# Patient Record
Sex: FEMALE | Race: WHITE | NOT HISPANIC OR LATINO | Employment: OTHER | URBAN - METROPOLITAN AREA
[De-identification: names, ages, dates, MRNs, and addresses within clinical notes are randomized per-mention and may not be internally consistent; named-entity substitution may affect disease eponyms.]

---

## 2017-01-24 ENCOUNTER — OFFICE VISIT (OUTPATIENT)
Dept: FAMILY MEDICINE | Facility: CLINIC | Age: 67
End: 2017-01-24
Payer: COMMERCIAL

## 2017-01-24 VITALS — HEART RATE: 72 BPM | SYSTOLIC BLOOD PRESSURE: 124 MMHG | TEMPERATURE: 99.4 F | DIASTOLIC BLOOD PRESSURE: 76 MMHG

## 2017-01-24 DIAGNOSIS — H26.9 CATARACT: ICD-10-CM

## 2017-01-24 DIAGNOSIS — Z01.818 PREOP GENERAL PHYSICAL EXAM: Primary | ICD-10-CM

## 2017-01-24 DIAGNOSIS — I10 HYPERTENSION GOAL BP (BLOOD PRESSURE) < 140/80: ICD-10-CM

## 2017-01-24 PROCEDURE — 99214 OFFICE O/P EST MOD 30 MIN: CPT | Performed by: FAMILY MEDICINE

## 2017-01-24 NOTE — PROGRESS NOTES
[facility] [place]  [address] [place] [id number]  [phone]  Dept: [phone]    PRE-OP EVALUATION:  Today's date: 2017    Melissa Jean Penrose (: 1950) presents for pre-operative evaluation assessment as requested by Dr. Christy.  She requires evaluation and anesthesia risk assessment prior to undergoing surgery/procedure for treatment of cateract .  Proposed procedure: right cataract     Date of Surgery/ Procedure: 2017  Time of Surgery/ Procedure: [facility]/Surgical Facility: [facility]    Primary Physician: Qamar Zavala  Type of Anesthesia Anticipated: to be determined    Patient has a Health Care Directive or Living Will:  YES     1. NO - Do you have a history of heart attack, stroke, stent, bypass or surgery on an artery in the head, neck, heart or legs?  2. NO - Do you ever have any pain or discomfort in your chest?  3. NO - Do you have a history of  Heart Failure?  4. NO - Are you troubled by shortness of breath when: walking on the level, up a slight hill or at night?  5. NO - Do you currently have a cold, bronchitis or other respiratory infection?  6. NO - Do you have a cough, shortness of breath or wheezing?  7. NO - Do you sometimes get pains in the calves of your legs when you walk?  8. NO - Do you or anyone in your family have previous history of blood clots?  9. NO - Do you or does anyone in your family have a serious bleeding problem such as prolonged bleeding following surgeries or cuts?  10. NO - Have you ever had problems with anemia or been told to take iron pills?  11. NO - Have you had any abnormal blood loss such as black, tarry or bloody stools, or abnormal vaginal bleeding?  12. YES - Have you ever had a blood transfusion?  13. NO - Have you or any of your relatives ever had problems with anesthesia?  14. NO - Do you have sleep apnea, excessive snoring or daytime drowsiness?  15. NO - Do you have any prosthetic heart  valves?  16. NO - Do you have prosthetic joints?  17. NO - Is there any chance that you may be pregnant?      HPI:                                                      Brief HPI related to upcoming procedure: issues with alcohol, she is abstaining it and feeling good. BP medication on metoprolol and losartn, which is stable, she does take few mediation for headache and migraine , and planing to follow up with [place] [facility].  Had frequent falls recently, some related to alcohol intake. Denies any current headache.          MEDICAL HISTORY:                                                      Patient Active Problem List    Diagnosis Date Noted     Lactic acidosis 09/20/2016     Priority: Medium     ELIZABETH (acute kidney injury) (H) 07/13/2016     Priority: Medium     ACP (advance care planning) 06/20/2016     Priority: Medium     Advance Care Planning 6/20/2016: Receipt of ACP document:  Received: Health Care Directive which was witnessed or notarized on 11-10-15.  Document not previously scanned.  Validation form completed and sent with document to be scanned.  Code Status reflects choices in most recent ACP document.  Confirmed/documented designated decision maker(s).  Added by Ronda Mullen RN Advance Care Planning Liaison with [employer]             Migraine without aura and without status migrainosus, not intractable 06/07/2016     Priority: Medium     Short-term memory loss 06/07/2016     Priority: Medium     Noticed after the concussion.       Traumatic brain injury, with loss of consciousness of unspecified duration, sequela (H) 06/07/2016     Priority: Medium     Follow up in [facility], also saw [facility].       Hypertension goal BP (blood pressure) < 140/80 06/07/2016     Priority: Medium     Alcohol abuse 06/07/2016     Priority: Medium     Frequent falls 06/07/2016     Priority: Medium     Elevated liver function tests 06/07/2016     Priority: Medium     Pulmonary  nodules 06/07/2016     Priority: Medium     CT June-2016 follow up in 1 year.        Past Medical History   Diagnosis Date     Hypertension      TBI (traumatic brain injury) (H)      Substance abuse      ETOH     Dementia      Allergic state      Past Surgical History   Procedure Laterality Date     Orthopedic surgery       Current Outpatient Prescriptions   Medication Sig Dispense Refill     acetaminophen (TYLENOL) 325 MG tablet Take 2 tablets (650 mg) by mouth every 6 hours as needed for mild pain 100 tablet 0     traMADol (ULTRAM) 50 MG tablet Take 0.5 tablets (25 mg) by mouth every 6 hours as needed for moderate pain 20 tablet 0     MELOXICAM PO Take 15 mg by mouth daily as needed       METOPROLOL TARTRATE PO Take 25 mg by mouth 2 times daily       NORTRIPTYLINE HCL PO Take 25 mg by mouth 2 times daily as needed       zolpidem (AMBIEN CR) 12.5 MG CR tablet Take 12.5 mg by mouth nightly as needed for sleep       MELATONIN PO Take 3 mg by mouth nightly as needed       losartan (COZAAR) 100 MG tablet Take 1 tablet by mouth daily        SUMAtriptan (IMITREX) 100 MG tablet Take 1 tablet by mouth at onset of headache (Take every 2 hours as needed. Max 200mg in 24 hours.)   3     Ascorbic Acid (VITAMIN C PO) Take 500 mg by mouth daily        multivitamin, therapeutic (THERA-VIT) TABS Take 1 tablet by mouth daily        azelastine (ASTELIN) 0.1 % nasal spray Spray 2 sprays into both nostrils every morning        mometasone (NASONEX) 50 MCG/ACT nasal spray Spray 2 sprays into both nostrils At Bedtime        Gabapentin (NEURONTIN PO) Take 300 mg by mouth daily as needed        Topiramate (TOPAMAX PO) Take 25 mg by mouth 2 times daily        OTC products: None, except as noted above    Allergies   Allergen Reactions     Nuts [Peanut-Derived] Nausea and Vomiting     Amoxicillin      Morphine       Latex Allergy: NO    Social History   Substance Use Topics     Smoking status: Former Smoker     Quit date: 10/23/1985      Smokeless tobacco: Never Used     Alcohol Use: 0.5 oz/week     1 Shots of liquor per week      Comment: daily 3-4 oz     History   Drug Use No       REVIEW OF SYSTEMS:                                                    C: NEGATIVE for fever, chills, change in weight  E/M: NEGATIVE for ear, mouth and throat problems  R: NEGATIVE for significant cough or SOB  CV: NEGATIVE for chest pain, palpitations or peripheral edema    EXAM:                                                    /76 mmHg  Pulse 72  Temp(Src) 99.4  F (37.4  C) (Tympanic)  Wt   GENERAL APPEARANCE: healthy, alert and no distress  HENT: ear canals and TM's normal and nose and mouth without ulcers or lesions  RESP: lungs clear to auscultation - no rales, rhonchi or wheezes  CV: regular rate and rhythm, normal S1 S2, no S3 or S4 and no murmur, click or rub   ABDOMEN: soft, nontender, no HSM or masses and bowel sounds normal  NEURO: Normal strength and tone, sensory exam grossly normal, mentation intact and speech normal    DIAGNOSTICS:                                                    EKG not indicated    Recent Labs   Lab Test  12/29/16   0125  09/21/16   0850  09/21/16   0559   10/23/14   1035   HGB  13.0  12.1   --    < >  12.1   PLT  211  113*   --    < >  267   INR   --    --    --    --   0.94   NA  136   --   138   < >  137   POTASSIUM  3.5   --   3.4   < >  4.0   CR  0.76   --   0.64   < >  1.37*    < > = values in this interval not displayed.        IMPRESSION:                                                    Reason for surgery/procedure:   Diagnosis/reason for consult:   1. Preop general physical exam    2. Cataract    3. Hypertension goal BP (blood pressure) < 140/80          The proposed surgical procedure is considered LOW risk.    REVISED CARDIAC RISK INDEX  The patient has the following serious cardiovascular risks for perioperative complications such as (MI, PE, VFib and 3  AV Block):  No serious cardiac risks      The patient  has the following additional risks for perioperative complications:  No identified additional risks      ICD-10-CM    1. Preop general physical exam Z01.818        RECOMMENDATIONS:                                                          --Patient is to take all scheduled medications on the day of surgery EXCEPT for modifications listed below.    APPROVAL GIVEN to proceed with proposed procedure, without further diagnostic evaluation       Signed Electronically by: Qamar Zavala MD    Copy of this evaluation report is provided to requesting physician.    [place] Preop Guidelines

## 2017-01-24 NOTE — MR AVS SNAPSHOT
After Visit Summary   1/24/2017    Melissa Jean Penrose    MRN: [id number]           Patient Information     Date Of Birth          1950        Visit Information        Provider Department      1/24/2017 3:00 PM Qamar Zavala MD [facility] [place]        Today's Diagnoses     Preop general physical exam    -  1     Cataract         Hypertension goal BP (blood pressure) < 140/80           Care Instructions      Before Your Surgery      Call your surgeon if there is any change in your health. This includes signs of a cold or flu (such as a sore throat, runny nose, cough, rash or fever).    Do not smoke, drink alcohol or take over the counter medicine (unless your surgeon or primary care doctor tells you to) for the 24 hours before and after surgery.    If you take prescribed drugs: Follow your doctor s orders about which medicines to take and which to stop until after surgery.    Eating and drinking prior to surgery: follow the instructions from your surgeon    Take a shower or bath the night before surgery. Use the soap your surgeon gave you to gently clean your skin. If you do not have soap from your surgeon, use your regular soap. Do not shave or scrub the surgery site.  Wear clean pajamas and have clean sheets on your bed.         Follow-ups after your visit        Your next 10 appointments already scheduled     Feb 07, 2017   Procedure with Brianna Christy MD   [facility] (--)    [address]  [facility] [id number] [phone]              Who to contact     If you have questions or need follow up information about today's clinic visit or your schedule please contact [facility] [place] directly at [phone].  Normal or non-critical lab and imaging results will be communicated to you by MyChart, letter or phone within 4 business days after the clinic has received the results. If you do not hear from us within 7 days, please  contact the clinic through [employer] or phone. If you have a critical or abnormal lab result, we will notify you by phone as soon as possible.  Submit refill requests through [employer] or call your pharmacy and they will forward the refill request to us. Please allow 3 business days for your refill to be completed.          Additional Information About Your Visit        [employer] Information     [employer] gives you secure access to your electronic health record. If you see a primary care provider, you can also send messages to your care team and make appointments. If you have questions, please call your primary care clinic.  If you do not have a primary care provider, please call [phone] and they will assist you.        Care EveryWhere ID     This is your Care EveryWhere ID. This could be used by other organizations to access your [facility] medical records  [id number]        Your Vitals Were     Pulse Temperature                72 99.4  F (37.4  C) (Tympanic)           Blood Pressure from Last 3 Encounters:   01/24/17 124/76   12/29/16 132/94   09/21/16 148/87    Weight from Last 3 Encounters:   09/20/16 216 lb 14.4 oz (98.385 kg)   09/19/16 231 lb (104.781 kg)   09/09/16 231 lb (104.781 kg)              Today, you had the following     No orders found for display         Today's Medication Changes          These changes are accurate as of: 1/24/17  3:26 PM.  If you have any questions, ask your nurse or doctor.               Stop taking these medicines if you haven't already. Please contact your care team if you have questions.     traMADol 50 MG tablet   Commonly known as:  ULTRAM   Stopped by:  Qamar Zavala MD                    Primary Care Provider Office Phone # Fax #    Qamar Zavala [phone]       [facility] [place] [address] [place] [address] [address]  [place] [address]        Thank you!     Thank you for choosing [facility] [place]  for your care. Our goal is always to provide you  with excellent care. Hearing back from our patients is one way we can continue to improve our services. Please take a few minutes to complete the written survey that you may receive in the mail after your visit with us. Thank you!             Your Updated Medication List - Protect others around you: Learn how to safely use, store and throw away your medicines at www.disposemymeds.org.          This list is accurate as of: 1/24/17  3:26 PM.  Always use your most recent med list.                   Brand Name Dispense Instructions for use    acetaminophen 325 MG tablet    TYLENOL    100 tablet    Take 2 tablets (650 mg) by mouth every 6 hours as needed for mild pain       azelastine 0.1 % spray    ASTELIN     Spray 2 sprays into both nostrils every morning       losartan 100 MG tablet    COZAAR     Take 1 tablet by mouth daily       MELATONIN PO      Take 3 mg by mouth nightly as needed       MELOXICAM PO      Take 15 mg by mouth daily as needed       METOPROLOL TARTRATE PO      Take 25 mg by mouth 2 times daily       mometasone 50 MCG/ACT spray    NASONEX     Spray 2 sprays into both nostrils At Bedtime       multivitamin, therapeutic Tabs tablet      Take 1 tablet by mouth daily       NEURONTIN PO      Take 300 mg by mouth daily as needed       NORTRIPTYLINE HCL PO      Take 25 mg by mouth 2 times daily as needed       SUMAtriptan 100 MG tablet    IMITREX     Take 1 tablet by mouth at onset of headache (Take every 2 hours as needed. Max 200mg in 24 hours.)       TOPAMAX PO      Take 25 mg by mouth 2 times daily       VITAMIN C PO      Take 500 mg by mouth daily       zolpidem 12.5 MG CR tablet    AMBIEN CR     Take 12.5 mg by mouth nightly as needed for sleep

## 2017-02-07 ENCOUNTER — ANESTHESIA EVENT (OUTPATIENT)
Dept: SURGERY | Facility: CLINIC | Age: 67
End: 2017-02-07
Payer: MEDICARE

## 2017-02-07 ENCOUNTER — ANESTHESIA (OUTPATIENT)
Dept: SURGERY | Facility: CLINIC | Age: 67
End: 2017-02-07
Payer: MEDICARE

## 2017-02-07 PROCEDURE — 25800025 ZZH RX 258: Performed by: NURSE ANESTHETIST, CERTIFIED REGISTERED

## 2017-02-07 PROCEDURE — 25000125 ZZHC RX 250: Performed by: NURSE ANESTHETIST, CERTIFIED REGISTERED

## 2017-02-07 PROCEDURE — 25000128 H RX IP 250 OP 636: Performed by: NURSE ANESTHETIST, CERTIFIED REGISTERED

## 2017-02-07 RX ORDER — SODIUM CHLORIDE, SODIUM LACTATE, POTASSIUM CHLORIDE, CALCIUM CHLORIDE 600; 310; 30; 20 MG/100ML; MG/100ML; MG/100ML; MG/100ML
INJECTION, SOLUTION INTRAVENOUS CONTINUOUS PRN
Status: DISCONTINUED | OUTPATIENT
Start: 2017-02-07 | End: 2017-02-07

## 2017-02-07 RX ORDER — ONDANSETRON 2 MG/ML
INJECTION INTRAMUSCULAR; INTRAVENOUS PRN
Status: DISCONTINUED | OUTPATIENT
Start: 2017-02-07 | End: 2017-02-07

## 2017-02-07 RX ORDER — FENTANYL CITRATE 50 UG/ML
INJECTION, SOLUTION INTRAMUSCULAR; INTRAVENOUS PRN
Status: DISCONTINUED | OUTPATIENT
Start: 2017-02-07 | End: 2017-02-07

## 2017-02-07 RX ADMIN — DEXMEDETOMIDINE 8 MCG: 100 INJECTION, SOLUTION, CONCENTRATE INTRAVENOUS at 08:32

## 2017-02-07 RX ADMIN — MIDAZOLAM HYDROCHLORIDE 1 MG: 1 INJECTION, SOLUTION INTRAMUSCULAR; INTRAVENOUS at 08:29

## 2017-02-07 RX ADMIN — DEXMEDETOMIDINE 8 MCG: 100 INJECTION, SOLUTION, CONCENTRATE INTRAVENOUS at 08:25

## 2017-02-07 RX ADMIN — SODIUM CHLORIDE, POTASSIUM CHLORIDE, SODIUM LACTATE AND CALCIUM CHLORIDE: 600; 310; 30; 20 INJECTION, SOLUTION INTRAVENOUS at 08:00

## 2017-02-07 RX ADMIN — FENTANYL CITRATE 25 MCG: 50 INJECTION, SOLUTION INTRAMUSCULAR; INTRAVENOUS at 08:27

## 2017-02-07 RX ADMIN — ONDANSETRON 4 MG: 2 INJECTION INTRAMUSCULAR; INTRAVENOUS at 08:29

## 2017-02-07 RX ADMIN — MIDAZOLAM HYDROCHLORIDE 1 MG: 1 INJECTION, SOLUTION INTRAMUSCULAR; INTRAVENOUS at 08:23

## 2017-02-07 RX ADMIN — MIDAZOLAM HYDROCHLORIDE 1 MG: 1 INJECTION, SOLUTION INTRAMUSCULAR; INTRAVENOUS at 08:35

## 2017-02-07 RX ADMIN — FENTANYL CITRATE 25 MCG: 50 INJECTION, SOLUTION INTRAMUSCULAR; INTRAVENOUS at 08:32

## 2017-02-07 RX ADMIN — DEXMEDETOMIDINE 4 MCG: 100 INJECTION, SOLUTION, CONCENTRATE INTRAVENOUS at 08:29

## 2017-02-07 NOTE — ANESTHESIA POSTPROCEDURE EVALUATION
Patient: Melissa Jean Penrose    Procedure(s):  RIGHT PHACOEMULSIFICATION CLEAR CORNEA WITH STANDARD INTRAOCULAR LENS IMPLANT  - Wound Class: I-Clean    Diagnosis:RIGHT EYE CATARACT   Diagnosis Additional Information: No value filed.    Anesthesia Type:  MAC    Note:  Anesthesia Post Evaluation    Patient location during evaluation: PACU  Patient participation: Able to fully participate in evaluation  Level of consciousness: awake  Pain management: adequate  Airway patency: patent  Cardiovascular status: acceptable  Respiratory status: acceptable  Hydration status: acceptable  PONV: none     Anesthetic complications: None          Last vitals:  Filed Vitals:    02/07/17 0736 02/07/17 0903 02/07/17 0905   BP: 120/75 111/70 120/69   Temp: 36.8  C (98.3  F)     Resp: 16 16 16   SpO2: 100% 100%          Electronically Signed By: Pasquale Jay MD  February 7, 2017  9:31 AM

## 2017-02-07 NOTE — ANESTHESIA CARE TRANSFER NOTE
Patient: Melissa Jean Penrose    Procedure(s):  RIGHT PHACOEMULSIFICATION CLEAR CORNEA WITH STANDARD INTRAOCULAR LENS IMPLANT  - Wound Class: I-Clean    Diagnosis: RIGHT EYE CATARACT   Diagnosis Additional Information: No value filed.    Anesthesia Type:   MAC     Note:  Airway :Room Air  Patient transferred to:PACU (EC)  Comments: VSS, report to RN, no anesthetic issues.      Vitals: (Last set prior to Anesthesia Care Transfer)    CRNA VITALS  2/7/2017 0828 - 2/7/2017 0902      2/7/2017             Pulse: 67    SpO2: 98 %    Resp Rate (set): 10                Electronically Signed By: DALIA Herman CRNA  February 7, 2017  9:02 AM

## 2017-04-27 ENCOUNTER — OFFICE VISIT (OUTPATIENT)
Dept: FAMILY MEDICINE | Facility: CLINIC | Age: 67
End: 2017-04-27
Payer: COMMERCIAL

## 2017-04-27 VITALS
OXYGEN SATURATION: 97 % | WEIGHT: 212 LBS | HEIGHT: 71 IN | SYSTOLIC BLOOD PRESSURE: 100 MMHG | DIASTOLIC BLOOD PRESSURE: 68 MMHG | HEART RATE: 88 BPM | BODY MASS INDEX: 29.68 KG/M2 | TEMPERATURE: 97.7 F

## 2017-04-27 DIAGNOSIS — Z13.6 CARDIOVASCULAR SCREENING; LDL GOAL LESS THAN 130: ICD-10-CM

## 2017-04-27 DIAGNOSIS — S06.9X9S TRAUMATIC BRAIN INJURY, WITH LOSS OF CONSCIOUSNESS OF UNSPECIFIED DURATION, SEQUELA: ICD-10-CM

## 2017-04-27 DIAGNOSIS — I10 HYPERTENSION GOAL BP (BLOOD PRESSURE) < 140/80: ICD-10-CM

## 2017-04-27 DIAGNOSIS — Z71.89 ACP (ADVANCE CARE PLANNING): Primary | Chronic | ICD-10-CM

## 2017-04-27 DIAGNOSIS — F10.10 ALCOHOL ABUSE: ICD-10-CM

## 2017-04-27 DIAGNOSIS — Z12.31 VISIT FOR SCREENING MAMMOGRAM: ICD-10-CM

## 2017-04-27 DIAGNOSIS — Z00.00 ENCOUNTER FOR ROUTINE ADULT HEALTH EXAMINATION WITHOUT ABNORMAL FINDINGS: ICD-10-CM

## 2017-04-27 DIAGNOSIS — Z11.59 NEED FOR HEPATITIS C SCREENING TEST: ICD-10-CM

## 2017-04-27 PROCEDURE — 36415 COLL VENOUS BLD VENIPUNCTURE: CPT | Performed by: PHYSICIAN ASSISTANT

## 2017-04-27 PROCEDURE — 99212 OFFICE O/P EST SF 10 MIN: CPT | Mod: 25 | Performed by: PHYSICIAN ASSISTANT

## 2017-04-27 PROCEDURE — 80053 COMPREHEN METABOLIC PANEL: CPT | Performed by: PHYSICIAN ASSISTANT

## 2017-04-27 PROCEDURE — G0438 PPPS, INITIAL VISIT: HCPCS | Performed by: PHYSICIAN ASSISTANT

## 2017-04-27 PROCEDURE — 80061 LIPID PANEL: CPT | Performed by: PHYSICIAN ASSISTANT

## 2017-04-27 PROCEDURE — 86803 HEPATITIS C AB TEST: CPT | Performed by: PHYSICIAN ASSISTANT

## 2017-04-27 NOTE — NURSING NOTE
"Chief Complaint   Patient presents with     Physical     fasting       Initial /68 (BP Location: Left arm, Patient Position: Chair, Cuff Size: Adult Regular)  Pulse 88  Temp 97.7  F (36.5  C) (Tympanic)  Ht 5' 11.25\" (1.81 m)  Wt 212 lb (96.2 kg)  SpO2 97%  BMI 29.36 kg/m2 Estimated body mass index is 29.36 kg/(m^2) as calculated from the following:    Height as of this encounter: 5' 11.25\" (1.81 m).    Weight as of this encounter: 212 lb (96.2 kg).  Medication Reconciliation: complete  "

## 2017-04-27 NOTE — PATIENT INSTRUCTIONS

## 2017-04-27 NOTE — PROGRESS NOTES
"   SUBJECTIVE:       SUBJECTIVE:                                                            Melissa Jean Penrose is a 66 year old female who presents for Preventive Visit.      Are you in the first 12 months of your Medicare Part B coverage?  No    Healthy Habits:    Do you get at least three servings of calcium containing foods daily (dairy, green leafy vegetables, etc.)? yes    Amount of exercise or daily activities, outside of work: tries to walk around the house, 5 times a day     Problems taking medications regularly No    Medication side effects: No    Have you had an eye exam in the past two years? yes    Do you see a dentist twice per year? no    Do you have sleep apnea, excessive snoring or daytime drowsiness?no    COGNITIVE SCREEN  1) Repeat 3 items (Banana, Sunrise, Chair)    2) Clock draw: NORMAL  3) 3 item recall: Recalls 2 objects   Results: NORMAL clock, 1-2 items recalled: COGNITIVE IMPAIRMENT LESS LIKELY    Mini-CogTM Copyright RIKKI Ibanez. Licensed by the author for use in [facility]; reprinted with permission (pina@[facility]). All rights reserved.            PROBLEMS TO ADD ON...    Alcohol Abuse:  Suzanne is here today with her good friend Reena who helps her to keep her appointments and assists with transportation. Suzanne has a long standing history of alcohol abuse which she often denies.  She states that she drinks \" one scotch\" daily, but Reena is concerned because she often finds empty bottles around her home, and receipts for hundreds of dollars worth of alcohol.  Suzanne has been to the ED for dehydration and alcohol intoxication in the past as recently at 12/2016.  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  Suzanne has suffered some cognitive impairment and has been following with [facility] as recently as yesterday.  She has ongoing " symptoms of dizziness and headaches that occur daily.  She has been assessed for this numerous times in the past and this was thought to be multifactorial in origin ( reviewed care everywhere today).  In the past she was having frequent falls, denies falls at this time. She notes that she has been having daily headaches that leave her unable to get up from bed, and leave her incapacitated during the day.   She is prescribed Nortriptyline and Imitrex for headaches.  She has not been taking her nortriptyline and is unsure when the last time she took this was.  She takes Imitrex 4-5 times weekly with good effect.       In 2016 she went to [facility] x 2 weeks for failure to thrive and did quite well there. She ultimately left after a few weeks and had been receiving home care services through  and then from an outside organizations up until 3 weeks ago, when her home care nurse quit.  Reena feels that patient needs home care assistance again at this time, for nursing care and medication management as well as to help with nutrition and cooking and socialization.  Soham is not interested in assisted living at this time, but reena thinks this may be a good fit for her.  Soham would like to stay in her home.     Dizziness: See above      Headache: See above      Poor nutrition: lost 20 lbs  since last visit in 02/2017.  Soham is unsure what she eats during the day, originally stating that she eats 2 meals per day, but later saying she only eats once daily, this is unclear. She notes her chronic headaches and dizziness that keep her from wanting to eat.     Rectal bleeding:  Reena notes that soham has been concerned for this in the past, Soham denies melena, hematochezia and abdominal pain at this time.  Colonoscopy reported in 2014, no records found.           Reviewed and updated as needed this visit by clinical staff         Reviewed and updated as needed this visit by Provider         Social History   Substance Use Topics     Smoking status: Former Smoker     Quit date: 10/23/1985     Smokeless tobacco: Never Used     Alcohol use 0.5 oz/week     1 Shots of liquor per week      Comment: daily 3-4 oz       The patient does not drink >3 drinks per day nor >7 drinks per week.    Today's PHQ-2 Score:   PHQ-2 ( 1999 [employer]) 7/11/2016   Q1: Little interest or pleasure in doing things 0   Q2: Feeling down, depressed or hopeless 0   PHQ-2 Score 0       Do you feel safe in your environment - Yes    Do you have a Health Care Directive?: Yes: Patient states has Advance Directive and will bring in a copy to clinic.    Current providers sharing in care for this patient include:   Patient Care Team:  Qamar Zavala MD as PCP - General (Family Practice)  Hospice, [facility]      Hearing impairment: No    Ability to successfully perform activities of daily living: No, needs assistance with: transportation, shopping, housework, bathing, laundry and medications     Fall risk:       Home safety:  Is afraid of going up and down stairs. Friend reports there's been railings along the hallway       The following health maintenance items are reviewed in Epic and correct as of today:  Health Maintenance   Topic Date Due     HEPATITIS C SCREENING  10/19/1968     LIPID SCREEN Q5 YR FEMALE (SYSTEM ASSIGNED)  10/19/1995     MAMMO SCREEN Q2 YR (SYSTEM ASSIGNED)  01/01/2016     FALL RISK ASSESSMENT  07/11/2017     INFLUENZA VACCINE (SYSTEM ASSIGNED)  09/01/2017     PNEUMOCOCCAL (2 of 2 - PCV13) 09/06/2017     TETANUS IMMUNIZATION (SYSTEM ASSIGNED)  07/20/2019     ADVANCE DIRECTIVE PLANNING Q5 YRS (NO INBASKET)  09/06/2021     COLON CANCER SCREEN (SYSTEM ASSIGNED)  01/01/2024     DEXA SCAN SCREENING (SYSTEM ASSIGNED)  Completed         Mammogram Screening: Patient over age 50, mutual decision to screen reflected in health maintenance.     ROS:  Constitutional, HEENT, cardiovascular, pulmonary, GI, ,  musculoskeletal, neuro, skin, endocrine and psych systems are negative, except as otherwise noted.    Patient Active Problem List   Diagnosis     Migraine without aura and without status migrainosus, not intractable     Short-term memory loss     Traumatic brain injury, with loss of consciousness of unspecified duration, sequela (H)     Hypertension goal BP (blood pressure) < 140/80     Alcohol abuse     Frequent falls     Pulmonary nodules     ACP (advance care planning)     Past Surgical History:   Procedure Laterality Date     ORTHOPEDIC SURGERY       PHACOEMULSIFICATION CLEAR CORNEA WITH STANDARD INTRAOCULAR LENS IMPLANT Right 2/7/2017    Procedure: PHACOEMULSIFICATION CLEAR CORNEA WITH STANDARD INTRAOCULAR LENS IMPLANT;  Surgeon: Brianan Christy MD;  Location: [facility]       Social History   Substance Use Topics     Smoking status: Former Smoker     Quit date: 10/23/1985     Smokeless tobacco: Never Used     Alcohol use 0.5 oz/week     1 Shots of liquor per week      Comment: daily 3-4 oz     No family history on file.      Current Outpatient Prescriptions   Medication Sig Dispense Refill     acetaminophen (TYLENOL) 325 MG tablet Take 2 tablets (650 mg) by mouth every 6 hours as needed for mild pain 100 tablet 0     MELOXICAM PO Take 15 mg by mouth daily as needed       METOPROLOL TARTRATE PO Take 25 mg by mouth 2 times daily       losartan (COZAAR) 100 MG tablet Take 1 tablet by mouth daily        SUMAtriptan (IMITREX) 100 MG tablet Take 1 tablet by mouth at onset of headache (Take every 2 hours as needed. Max 200mg in 24 hours.) Reported on 4/27/2017  3     Ascorbic Acid (VITAMIN C PO) Take 500 mg by mouth daily        multivitamin, therapeutic (THERA-VIT) TABS Take 1 tablet by mouth daily        azelastine (ASTELIN) 0.1 % nasal spray Spray 2 sprays into both nostrils every morning        mometasone (NASONEX) 50 MCG/ACT nasal spray Spray 2 sprays into both nostrils At Bedtime        Gabapentin (NEURONTIN PO)  "Take 300 mg by mouth daily as needed        Topiramate (TOPAMAX PO) Take 25 mg by mouth 2 times daily        NORTRIPTYLINE HCL PO Take 25 mg by mouth 2 times daily as needed Reported on 4/27/2017       MELATONIN PO Take 3 mg by mouth nightly as needed Reported on 4/27/2017       Allergies   Allergen Reactions     Nuts [Peanut-Derived] Nausea and Vomiting     Amoxicillin      Morphine      Recent Labs   Lab Test  12/29/16   0125  09/21/16   0559  09/20/16   1345   10/23/14   1035   ALT  35  63*  78*   < >   --    CR  0.76  0.64  0.59   < >  1.37*   GFRESTIMATED  76  >90  Non  GFR Calc    >90  Non  GFR Calc     < >  39*   GFRESTBLACK  >90   GFR Calc    >90   GFR Calc    >90   GFR Calc     < >  47*   POTASSIUM  3.5  3.4  3.8   < >  4.0   TSH   --    --    --    --   6.12*    < > = values in this interval not displayed.      OBJECTIVE:                                                            There were no vitals taken for this visit. Estimated body mass index is 32.32 kg/(m^2) as calculated from the following:    Height as of 2/7/17: 5' 11.5\" (1.816 m).    Weight as of 2/7/17: 235 lb (106.6 kg).  EXAM:   GENERAL: healthy, alert and no distress  EYES: Eyes grossly normal to inspection, PERRL and conjunctivae and sclerae normal  HENT: ear canals and TM's normal, nose and mouth without ulcers or lesions  NECK: no adenopathy, no asymmetry, masses, or scars and thyroid normal to palpation  RESP: lungs clear to auscultation - no rales, rhonchi or wheezes  BREAST: normal without masses, tenderness or nipple discharge and no palpable axillary masses or adenopathy  CV: regular rate and rhythm, normal S1 S2, no S3 or S4, no murmur, click or rub, no peripheral edema   ABDOMEN: soft, nontender, no hepatosplenomegaly, no masses and bowel sounds normal  MS: no gross musculoskeletal defects noted, no edema  SKIN: no suspicious lesions or rashes  NEURO: " Normal strength and tone, mentation intact and speech normal  Rectal:  No masses, no hemorrhoid, light brown stool in vault  PSYCH: mentation appears normal, well groomed, judgement and insight impaired    ASSESSMENT / PLAN:                                                            1. Encounter for routine adult health examination without abnormal findings  Rectal examination shows no hemorrhoids, light brown stool noted in rectal vault, no evidence of bleeding.  Will look to see if records for colonoscopy are available, if no recent colonoscopy, will recommend she undergo screening colonoscopy.    2. ACP (advance care planning  - CARE COORDINATION REFERRAL    3. Traumatic brain injury, with loss of consciousness of unspecified duration, sequela (H)  Had long discussion with Reena and with Suzanne about next steps.  Suzanne is interested in home care at this time, to help with ADLs, medication adherence and nutrition management. I do believe that she would benefit from assisted living due to concerns of alcohol abuse and safety ( hx of falls), but she is very opposed to this. Suzanne has some mild cognitive impairment ( based on previous notes on care everywhere).  I have placed a home care referral for the time being to get this started.  Also suggested a adult day program which may be a good option as she would like to stay in her home, but could use help during the day.  I have placed a care coordination referral at this time to help with appropriate placement.  At this time, these concerns have been ongoing, I feel she is safe to go home with close follow up with home care and care coordination to make appropriate long term plan .  Will continue to follow up with her progress.  - HOME CARE NURSING REFERRAL  - CARE COORDINATION REFERRAL    4. Alcohol abuse  Unclear at this time.  Suzanne seems unreliable and I am unclear how often she is really consuming alcohol.  Does not appear intoxicated at this time, and  "denies having a problem.  As discussed above, home care and/or a day program would be helpful for her to be monitored during the day and ensure adequate nutrition and medication adherence.   - CARE COORDINATION REFERRAL    5. Hypertension goal BP (blood pressure) < 140/80  - Comprehensive metabolic panel (BMP + Alb, Alk Phos, ALT, AST, Total. Bili, TP)    6. Need for hepatitis C screening test  - Hepatitis C Screen Reflex to HCV RNA Quant and Genotype    7. Visit for screening mammogram  - MA SCREENING DIGITAL BILAT - Future  (s+30); Future    8. CARDIOVASCULAR SCREENING; LDL GOAL LESS THAN 130  - Lipid panel reflex to direct LDL    End of Life Planning:  Patient currently has an advanced directive: Yes.  Practitioner is supportive of decision.    COUNSELING:  Reviewed preventive health counseling, as reflected in patient instructions       Regular exercise       Hepatitis C screening        Estimated body mass index is 32.32 kg/(m^2) as calculated from the following:    Height as of 2/7/17: 5' 11.5\" (1.816 m).    Weight as of 2/7/17: 235 lb (106.6 kg).  Weight management plan: Discussed healthy diet and exercise guidelines and patient will follow up in 12 months in clinic to re-evaluate.   reports that she quit smoking about 31 years ago. She has never used smokeless tobacco.      Appropriate preventive services were discussed with this patient, including applicable screening as appropriate for cardiovascular disease, diabetes, osteopenia/osteoporosis, and glaucoma.  As appropriate for age/gender, discussed screening for colorectal cancer, prostate cancer, breast cancer, and cervical cancer. Checklist reviewing preventive services available has been given to the patient.    Reviewed patients plan of care and provided an AVS. The Complex Care Plan (for patients with higher acuity and needing more deliberate coordination of services) for Suzanne meets the Care Plan requirement. This Care Plan has been established and " reviewed with the Patient and caregiver.    Counseling Resources:  ATP IV Guidelines  Pooled Cohorts Equation Calculator  Breast Cancer Risk Calculator  FRAX Risk Assessment  ICSI Preventive Guidelines  Dietary Guidelines for Americans, 2010  USDA's MyPlate  ASA Prophylaxis  Lung CA Screening    Thuan Banda PA-C  [facility] [place]    SUBJECTIVE:

## 2017-04-27 NOTE — MR AVS SNAPSHOT
After Visit Summary   4/27/2017    Melissa Jean Penrose    MRN: [id number]           Patient Information     Date Of Birth          1950        Visit Information        Provider Department      4/27/2017 10:00 AM Thuan Banda PA-C [facility]        Today's Diagnoses     ACP (advance care planning)    -  1    Encounter for routine adult health examination without abnormal findings        Traumatic brain injury, with loss of consciousness of unspecified duration, sequela (H)        Alcohol abuse        Hypertension goal BP (blood pressure) < 140/80        Need for hepatitis C screening test        Visit for screening mammogram        CARDIOVASCULAR SCREENING; LDL GOAL LESS THAN 130          Care Instructions      Preventive Health Recommendations  Female Ages 65 +    Yearly exam:     See your health care provider every year in order to  o Review health changes.   o Discuss preventive care.    o Review your medicines if your doctor has prescribed any.      You no longer need a yearly Pap test unless you've had an abnormal Pap test in the past 10 years. If you have vaginal symptoms, such as bleeding or discharge, be sure to talk with your provider about a Pap test.      Every 1 to 2 years, have a mammogram.  If you are over 69, talk with your health care provider about whether or not you want to continue having screening mammograms.      Every 10 years, have a colonoscopy. Or, have a yearly FIT test (stool test). These exams will check for colon cancer.       Have a cholesterol test every 5 years, or more often if your doctor advises it.       Have a diabetes test (fasting glucose) every three years. If you are at risk for diabetes, you should have this test more often.       At age 65, have a bone density scan (DEXA) to check for osteoporosis (brittle bone disease).    Shots:    Get a flu shot each year.    Get a tetanus shot every 10 years.    Talk to your doctor about  your pneumonia vaccines. There are now two you should receive - Pneumovax (PPSV 23) and Prevnar (PCV 13).    Talk to your doctor about the shingles vaccine.    Talk to your doctor about the hepatitis B vaccine.    Nutrition:     Eat at least 5 servings of fruits and vegetables each day.      Eat whole-grain bread, whole-wheat pasta and brown rice instead of white grains and rice.      Talk to your provider about Calcium and Vitamin D.     Lifestyle    Exercise at least 150 minutes a week (30 minutes a day, 5 days a week). This will help you control your weight and prevent disease.      Limit alcohol to one drink per day.      No smoking.       Wear sunscreen to prevent skin cancer.       See your dentist twice a year for an exam and cleaning.      See your eye doctor every 1 to 2 years to screen for conditions such as glaucoma, macular degeneration, cataracts, etc   Preventive Health Recommendations    Female Ages 65 +    Yearly exam:   See your health care provider every year in order to  Review health changes.   Discuss preventive care.    Review your medicines if your doctor has prescribed any.    You no longer need a yearly Pap test unless you've had an abnormal Pap test in the past 10 years. If you have vaginal symptoms, such as bleeding or discharge, be sure to talk with your provider about a Pap test.    Every 1 to 2 years, have a mammogram.  If you are over 69, talk with your health care provider about whether or not you want to continue having screening mammograms.    Every 10 years, have a colonoscopy. Or, have a yearly FIT test (stool test). These exams will check for colon cancer.     Have a cholesterol test every 5 years, or more often if your doctor advises it.     Have a diabetes test (fasting glucose) every three years. If you are at risk for diabetes, you should have this test more often.     At age 65, have a bone density scan (DEXA) to check for osteoporosis (brittle bone disease).    Shots:  Get  a flu shot each year.  Get a tetanus shot every 10 years.  Talk to your doctor about your pneumonia vaccines. There are now two you should receive - Pneumovax (PPSV 23) and Prevnar (PCV 13).  Talk to your doctor about the shingles vaccine.  Talk to your doctor about the hepatitis B vaccine.    Nutrition:   Eat at least 5 servings of fruits and vegetables each day.    Eat whole-grain bread, whole-wheat pasta and brown rice instead of white grains and rice.    Talk to your provider about Calcium and Vitamin D.     Lifestyle  Exercise at least 150 minutes a week (30 minutes a day, 5 days a week). This will help you control your weight and prevent disease.    Limit alcohol to one drink per day.    No smoking.     Wear sunscreen to prevent skin cancer.     See your dentist twice a year for an exam and cleaning.    See your eye doctor every 1 to 2 years to screen for conditions such as glaucoma, macular degeneration and cataracts.  Preventive Health Recommendations    Female Ages 65 +    Yearly exam:   See your health care provider every year in order to  Review health changes.   Discuss preventive care.    Review your medicines if your doctor has prescribed any.    You no longer need a yearly Pap test unless you've had an abnormal Pap test in the past 10 years. If you have vaginal symptoms, such as bleeding or discharge, be sure to talk with your provider about a Pap test.    Every 1 to 2 years, have a mammogram.  If you are over 69, talk with your health care provider about whether or not you want to continue having screening mammograms.    Every 10 years, have a colonoscopy. Or, have a yearly FIT test (stool test). These exams will check for colon cancer.     Have a cholesterol test every 5 years, or more often if your doctor advises it.     Have a diabetes test (fasting glucose) every three years. If you are at risk for diabetes, you should have this test more often.     At age 65, have a bone density scan (DEXA) to  check for osteoporosis (brittle bone disease).    Shots:  Get a flu shot each year.  Get a tetanus shot every 10 years.  Talk to your doctor about your pneumonia vaccines. There are now two you should receive - Pneumovax (PPSV 23) and Prevnar (PCV 13).  Talk to your doctor about the shingles vaccine.  Talk to your doctor about the hepatitis B vaccine.    Nutrition:   Eat at least 5 servings of fruits and vegetables each day.    Eat whole-grain bread, whole-wheat pasta and brown rice instead of white grains and rice.    Talk to your provider about Calcium and Vitamin D.     Lifestyle  Exercise at least 150 minutes a week (30 minutes a day, 5 days a week). This will help you control your weight and prevent disease.    Limit alcohol to one drink per day.    No smoking.     Wear sunscreen to prevent skin cancer.     See your dentist twice a year for an exam and cleaning.    See your eye doctor every 1 to 2 years to screen for conditions such as glaucoma, macular degeneration and cataracts.        Follow-ups after your visit        Additional Services     HOME CARE NURSING REFERRAL       **Order classes of: [facility], [facility] and [facility] will route to the Home Care and Hospice Referral Pool.  Home Care or Hospice will then contact the patient to schedule their appointment.**    If you do not hear from Home Care and Hospice, or you would like to call to schedule, please call the referring place of service that your provider has listed below.  ______________________________________________________________________    Your provider has referred you to: [facility]: [facility] [place] [phone]   [email or web address]    Extended Emergency Contact Information  Primary Emergency Contact: Naheed Parks   [facility]  Home Phone: [phone]  Work Phone: none  Mobile Phone: [phone]  Relation: Neighbor  Secondary Emergency Contact: EDGARDO BUCKNER  [facility]  Home Phone: [phone]  Work Phone: NONE  Mobile Phone: [phone]  Relation: Friend    Patient Anticipated Discharge Date: TBD   RN, PT, HHA to begin 24 - 48 hours after discharge.  PLEASE EVALUATE AND TREAT (Evaluation timeline is 24 - 48 hrs. Please call if there is need for a variance to this timeline).    REASON FOR REFERRAL: Assessment & Treatment: PT and RN    ADDITIONAL SERVICES NEEDED: Private Duty: Suzanne could benefit from someone helping her with her ADLs, grocery shop, administer medication,assisting with cooking and ensuring hydration. She is also in need of some socialization.  Suzanne is not interested in assisted living at this time, and would prefer home care.    OTHER PERTINENT INFORMATION: Patient was last seen by provider on 04/27/2017 for physical examination.    Current Outpatient Prescriptions:  acetaminophen (TYLENOL) 325 MG tablet, Take 2 tablets (650 mg) by mouth every 6 hours as needed for mild pain, Disp: 100 tablet, Rfl: 0  MELOXICAM PO, Take 15 mg by mouth daily as needed, Disp: , Rfl:   METOPROLOL TARTRATE PO, Take 25 mg by mouth 2 times daily, Disp: , Rfl:   losartan (COZAAR) 100 MG tablet, Take 1 tablet by mouth daily , Disp: , Rfl:   SUMAtriptan (IMITREX) 100 MG tablet, Take 1 tablet by mouth at onset of headache (Take every 2 hours as needed. Max 200mg in 24 hours.) Reported on 4/27/2017, Disp: , Rfl: 3  Ascorbic Acid (VITAMIN C PO), Take 500 mg by mouth daily , Disp: , Rfl:   multivitamin, therapeutic (THERA-VIT) TABS, Take 1 tablet by mouth daily , Disp: , Rfl:   azelastine (ASTELIN) 0.1 % nasal spray, Spray 2 sprays into both nostrils every morning , Disp: , Rfl:   mometasone (NASONEX) 50 MCG/ACT nasal spray, Spray 2 sprays into both nostrils At Bedtime , Disp: , Rfl:   Gabapentin (NEURONTIN PO), Take 300 mg by mouth daily as needed , Disp: , Rfl:   Topiramate (TOPAMAX PO), Take 25 mg by mouth 2 times daily , Disp: , Rfl:   NORTRIPTYLINE HCL PO, Take 25 mg by mouth  2 times daily as needed Reported on 4/27/2017, Disp: , Rfl:   MELATONIN PO, Take 3 mg by mouth nightly as needed Reported on 4/27/2017, Disp: , Rfl:       Patient Active Problem List:     Migraine without aura and without status migrainosus, not intractable     Short-term memory loss     Traumatic brain injury, with loss of consciousness of unspecified duration, sequela (H)     Hypertension goal BP (blood pressure) < 140/80     Alcohol abuse     Frequent falls     Pulmonary nodules     ACP (advance care planning)      Documentation of Face to Face and Certification for Home Health Services    I certify that patient, Melissa Jean Penrose is under my care and that I, or a Nurse Practitioner or Physician's Assistant working with me, had a face-to-face encounter that meets the physician face-to-face encounter requirements with this patient on: 4/27/2017.    This encounter with the patient was in whole, or in part, for the following medical condition, which is the primary reason for Home Health Care: TBI, dizziness and memory loss.    I certify that, based on my findings, the following services are medically necessary Home Health Services: Nursing, Physical Therapy and Speech Language Therapy    My clinical findings support the need for the above services because: Nurse is needed: To provide assessment and oversight required in the home to assure adherence to the medical plan due to: TBI, alcohol abuse    Further, I certify that my clinical findings support that this patient is homebound (i.e. absences from home require considerable and taxing effort and are for medical reasons or Hinduism services or infrequently or of short duration when for other reasons) because: Requires assistance of another person or specialized equipment to access medical services because patient: Requires supervision of another for safe transfer..    Based on the above findings, I certify that this patient is confined to the home and needs  intermittent skilled nursing care, physical therapy and/or speech therapy.  The patient is under my care, and I have initiated the establishment of the plan of care.  This patient will be followed by a physician who will periodically review the plan of care.    Physician/Provider to provide follow up care: Qamar Zavala certified Physician at time of discharge: Thuan Banda PA-C, Qamar Zavala MD    Please be aware that coverage of these services is subject to the terms and limitations of your health insurance plan.  Call member services at your health plan with any benefit or coverage questions.                  Future tests that were ordered for you today     Open Future Orders        Priority Expected Expires Ordered    MA SCREENING DIGITAL BILAT - Future  (s+30) Routine  4/27/2018 4/27/2017            Who to contact     If you have questions or need follow up information about today's clinic visit or your schedule please contact [facility] [place] directly at [phone].  Normal or non-critical lab and imaging results will be communicated to you by [employer]Way[employer]hart, letter or phone within 4 business days after the clinic has received the results. If you do not hear from us within 7 days, please contact the clinic through Heyyt or phone. If you have a critical or abnormal lab result, we will notify you by phone as soon as possible.  Submit refill requests through [employer] or call your pharmacy and they will forward the refill request to us. Please allow 3 business days for your refill to be completed.          Additional Information About Your Visit        [employer] Information     [employer] gives you secure access to your electronic health record. If you see a primary care provider, you can also send messages to your care team and make appointments. If you have questions, please call your primary care clinic.  If you do not have a primary care provider, please call [phone] and they will  "assist you.        Care EveryWhere ID     This is your Care EveryWhere ID. This could be used by other organizations to access your [place] medical records  [id number]        Your Vitals Were     Pulse Temperature Height Pulse Oximetry BMI (Body Mass Index)       88 97.7  F (36.5  C) (Tympanic) 5' 11.25\" (1.81 m) 97% 29.36 kg/m2        Blood Pressure from Last 3 Encounters:   04/27/17 100/68   02/07/17 120/69   01/24/17 124/76    Weight from Last 3 Encounters:   04/27/17 212 lb (96.2 kg)   02/07/17 235 lb (106.6 kg)   09/20/16 216 lb 14.4 oz (98.4 kg)              We Performed the Following     Comprehensive metabolic panel (BMP + Alb, Alk Phos, ALT, AST, Total. Bili, TP)     Hepatitis C Screen Reflex to HCV RNA Quant and Genotype     HOME CARE NURSING REFERRAL     Lipid panel reflex to direct LDL        Primary Care Provider Office Phone # Fax #    Qamar Zavala [phone]       [facility] [place] [address] [place] [address] DR  [place] [address]        Thank you!     Thank you for choosing [facility]  for your care. Our goal is always to provide you with excellent care. Hearing back from our patients is one way we can continue to improve our services. Please take a few minutes to complete the written survey that you may receive in the mail after your visit with us. Thank you!             Your Updated Medication List - Protect others around you: Learn how to safely use, store and throw away your medicines at www.disposemymeds.org.          This list is accurate as of: 4/27/17 11:02 AM.  Always use your most recent med list.                   Brand Name Dispense Instructions for use    acetaminophen 325 MG tablet    TYLENOL    100 tablet    Take 2 tablets (650 mg) by mouth every 6 hours as needed for mild pain       azelastine 0.1 % spray    ASTELIN     Spray 2 sprays into both nostrils every morning       losartan 100 MG tablet    COZAAR     Take 1 tablet by mouth daily "       MELATONIN PO      Take 3 mg by mouth nightly as needed Reported on 4/27/2017       MELOXICAM PO      Take 15 mg by mouth daily as needed       METOPROLOL TARTRATE PO      Take 25 mg by mouth 2 times daily       mometasone 50 MCG/ACT spray    NASONEX     Spray 2 sprays into both nostrils At Bedtime       multivitamin, therapeutic Tabs tablet      Take 1 tablet by mouth daily       NEURONTIN PO      Take 300 mg by mouth daily as needed       NORTRIPTYLINE HCL PO      Take 25 mg by mouth 2 times daily as needed Reported on 4/27/2017       SUMAtriptan 100 MG tablet    IMITREX     Take 1 tablet by mouth at onset of headache (Take every 2 hours as needed. Max 200mg in 24 hours.) Reported on 4/27/2017       TOPAMAX PO      Take 25 mg by mouth 2 times daily       VITAMIN C PO      Take 500 mg by mouth daily

## 2017-04-28 ENCOUNTER — TELEPHONE (OUTPATIENT)
Dept: FAMILY MEDICINE | Facility: CLINIC | Age: 67
End: 2017-04-28

## 2017-04-28 ENCOUNTER — TELEPHONE (OUTPATIENT)
Dept: NURSING | Facility: CLINIC | Age: 67
End: 2017-04-28

## 2017-04-28 DIAGNOSIS — R74.02 NONSPECIFIC ELEVATION OF LEVELS OF TRANSAMINASE OR LACTIC ACID DEHYDROGENASE (LDH): Primary | ICD-10-CM

## 2017-04-28 DIAGNOSIS — R74.01 NONSPECIFIC ELEVATION OF LEVELS OF TRANSAMINASE OR LACTIC ACID DEHYDROGENASE (LDH): Primary | ICD-10-CM

## 2017-04-28 LAB
ALBUMIN SERPL-MCNC: 4 G/DL (ref 3.4–5)
ALP SERPL-CCNC: 143 U/L (ref 40–150)
ALT SERPL W P-5'-P-CCNC: 104 U/L (ref 0–50)
ANION GAP SERPL CALCULATED.3IONS-SCNC: 14 MMOL/L (ref 3–14)
AST SERPL W P-5'-P-CCNC: 152 U/L (ref 0–45)
BILIRUB SERPL-MCNC: 0.6 MG/DL (ref 0.2–1.3)
BUN SERPL-MCNC: 7 MG/DL (ref 7–30)
CALCIUM SERPL-MCNC: 8.8 MG/DL (ref 8.5–10.1)
CHLORIDE SERPL-SCNC: 100 MMOL/L (ref 94–109)
CHOLEST SERPL-MCNC: 141 MG/DL
CO2 SERPL-SCNC: 25 MMOL/L (ref 20–32)
CREAT SERPL-MCNC: 0.8 MG/DL (ref 0.52–1.04)
GFR SERPL CREATININE-BSD FRML MDRD: 72 ML/MIN/1.7M2
GLUCOSE SERPL-MCNC: 92 MG/DL (ref 70–99)
HCV AB SERPL QL IA: NORMAL
HDLC SERPL-MCNC: 86 MG/DL
LDLC SERPL CALC-MCNC: 41 MG/DL
NONHDLC SERPL-MCNC: 55 MG/DL
POTASSIUM SERPL-SCNC: 3.2 MMOL/L (ref 3.4–5.3)
PROT SERPL-MCNC: 8.1 G/DL (ref 6.8–8.8)
SODIUM SERPL-SCNC: 139 MMOL/L (ref 133–144)
TRIGL SERPL-MCNC: 70 MG/DL

## 2017-04-28 NOTE — TELEPHONE ENCOUNTER
Please call patient friend Reena to inform patient about her lab results.  He liver function tests continue to look elevated.  I do not see that a workup has been completed for this in the past ( RUQ US).  These are likely elevated due to drinking alcohol, but we should get a RUQ US to assess for significant liver disease.  I have ordered this, please advise that patient schedule this.  Her potassium was also slightly low, this should be rechecked in 2-3 weeks.

## 2017-04-28 NOTE — TELEPHONE ENCOUNTER
No consent to communicate on file, received verbal approval to talk with Reena about following message    Informed Reena of message, will be contacting [facility] about scheduling the US    TRENTON Dumont

## 2017-04-28 NOTE — TELEPHONE ENCOUNTER
Patient unable to see home care nurse until Monday. Verbal order given for delay of start.    Verbal order given for social work to eval and treat.  Rachel Garvey RN

## 2017-04-28 NOTE — TELEPHONE ENCOUNTER
Calling to say that you will have to contact [facility] directly about the ultrasound. Please call with questions.  Georgina Kirkpatrick RN-[facility] Nurse Advisors

## 2017-05-01 ENCOUNTER — APPOINTMENT (OUTPATIENT)
Dept: CT IMAGING | Facility: CLINIC | Age: 67
End: 2017-05-01
Attending: EMERGENCY MEDICINE
Payer: MEDICARE

## 2017-05-01 ENCOUNTER — CARE COORDINATION (OUTPATIENT)
Dept: CARE COORDINATION | Facility: CLINIC | Age: 67
End: 2017-05-01

## 2017-05-01 ENCOUNTER — APPOINTMENT (OUTPATIENT)
Dept: GENERAL RADIOLOGY | Facility: CLINIC | Age: 67
End: 2017-05-01
Attending: EMERGENCY MEDICINE
Payer: MEDICARE

## 2017-05-01 ENCOUNTER — HOSPITAL ENCOUNTER (OUTPATIENT)
Facility: CLINIC | Age: 67
Setting detail: OBSERVATION
Discharge: PSYCHIATRIC OR SUBSTANCE USE FACILITY | End: 2017-05-02
Attending: EMERGENCY MEDICINE | Admitting: INTERNAL MEDICINE
Payer: MEDICARE

## 2017-05-01 DIAGNOSIS — F10.930 ALCOHOL WITHDRAWAL, UNCOMPLICATED (H): ICD-10-CM

## 2017-05-01 DIAGNOSIS — R74.8 ELEVATED LIVER ENZYMES: ICD-10-CM

## 2017-05-01 DIAGNOSIS — S09.90XA CLOSED HEAD INJURY, INITIAL ENCOUNTER: ICD-10-CM

## 2017-05-01 DIAGNOSIS — S00.81XA ABRASION, FACE W/O INFECTION: Primary | ICD-10-CM

## 2017-05-01 DIAGNOSIS — R55 SYNCOPE, UNSPECIFIED SYNCOPE TYPE: ICD-10-CM
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
## 2017-05-01 LAB
ALBUMIN SERPL-MCNC: 3.6 G/DL (ref 3.4–5)
ALBUMIN UR-MCNC: 10 MG/DL
ALP SERPL-CCNC: 155 U/L (ref 40–150)
ALT SERPL W P-5'-P-CCNC: 103 U/L (ref 0–50)
ANION GAP SERPL CALCULATED.3IONS-SCNC: 18 MMOL/L (ref 3–14)
APPEARANCE UR: CLEAR
AST SERPL W P-5'-P-CCNC: 194 U/L (ref 0–45)
BASOPHILS # BLD AUTO: 0 10E9/L (ref 0–0.2)
BASOPHILS NFR BLD AUTO: 0.3 %
BILIRUB SERPL-MCNC: 1 MG/DL (ref 0.2–1.3)
BILIRUB UR QL STRIP: NEGATIVE
BUN SERPL-MCNC: 12 MG/DL (ref 7–30)
CALCIUM SERPL-MCNC: 9.1 MG/DL (ref 8.5–10.1)
CHLORIDE SERPL-SCNC: 103 MMOL/L (ref 94–109)
CO2 SERPL-SCNC: 19 MMOL/L (ref 20–32)
COLOR UR AUTO: YELLOW
CREAT SERPL-MCNC: 0.74 MG/DL (ref 0.52–1.04)
DIFFERENTIAL METHOD BLD: ABNORMAL
EOSINOPHIL # BLD AUTO: 0 10E9/L (ref 0–0.7)
EOSINOPHIL NFR BLD AUTO: 0 %
ERYTHROCYTE [DISTWIDTH] IN BLOOD BY AUTOMATED COUNT: 13.4 % (ref 10–15)
ETHANOL SERPL-MCNC: 0.03 G/DL
GFR SERPL CREATININE-BSD FRML MDRD: 79 ML/MIN/1.7M2
GLUCOSE SERPL-MCNC: 118 MG/DL (ref 70–99)
GLUCOSE UR STRIP-MCNC: 300 MG/DL
HCT VFR BLD AUTO: 38.2 % (ref 35–47)
HGB BLD-MCNC: 13 G/DL (ref 11.7–15.7)
HGB UR QL STRIP: ABNORMAL
HYALINE CASTS #/AREA URNS LPF: 3 /LPF (ref 0–2)
IMM GRANULOCYTES # BLD: 0.1 10E9/L (ref 0–0.4)
IMM GRANULOCYTES NFR BLD: 0.5 %
INR PPP: 1.07 (ref 0.86–1.14)
KETONES UR STRIP-MCNC: 40 MG/DL
LACTATE BLD-SCNC: 4.1 MMOL/L (ref 0.7–2.1)
LEUKOCYTE ESTERASE UR QL STRIP: NEGATIVE
LYMPHOCYTES # BLD AUTO: 0.9 10E9/L (ref 0.8–5.3)
LYMPHOCYTES NFR BLD AUTO: 8.5 %
MCH RBC QN AUTO: 36.2 PG (ref 26.5–33)
MCHC RBC AUTO-ENTMCNC: 34 G/DL (ref 31.5–36.5)
MCV RBC AUTO: 106 FL (ref 78–100)
MONOCYTES # BLD AUTO: 0.9 10E9/L (ref 0–1.3)
MONOCYTES NFR BLD AUTO: 8.6 %
MUCOUS THREADS #/AREA URNS LPF: PRESENT /LPF
NEUTROPHILS # BLD AUTO: 8.6 10E9/L (ref 1.6–8.3)
NEUTROPHILS NFR BLD AUTO: 82.1 %
NITRATE UR QL: NEGATIVE
NRBC # BLD AUTO: 0 10*3/UL
NRBC BLD AUTO-RTO: 0 /100
PH UR STRIP: 6 PH (ref 5–7)
PLATELET # BLD AUTO: 149 10E9/L (ref 150–450)
POTASSIUM SERPL-SCNC: 3.6 MMOL/L (ref 3.4–5.3)
PROT SERPL-MCNC: 7.5 G/DL (ref 6.8–8.8)
RBC # BLD AUTO: 3.59 10E12/L (ref 3.8–5.2)
RBC #/AREA URNS AUTO: 1 /HPF (ref 0–2)
SODIUM SERPL-SCNC: 140 MMOL/L (ref 133–144)
SP GR UR STRIP: 1.02 (ref 1–1.03)
SQUAMOUS #/AREA URNS AUTO: <1 /HPF (ref 0–1)
TROPONIN I SERPL-MCNC: 0.03 UG/L (ref 0–0.04)
URN SPEC COLLECT METH UR: ABNORMAL
UROBILINOGEN UR STRIP-MCNC: 2 MG/DL (ref 0–2)
WBC # BLD AUTO: 10.5 10E9/L (ref 4–11)
WBC #/AREA URNS AUTO: 3 /HPF (ref 0–2)

## 2017-05-01 PROCEDURE — G0378 HOSPITAL OBSERVATION PER HR: HCPCS

## 2017-05-01 PROCEDURE — 25000128 H RX IP 250 OP 636: Performed by: EMERGENCY MEDICINE

## 2017-05-01 PROCEDURE — 96365 THER/PROPH/DIAG IV INF INIT: CPT

## 2017-05-01 PROCEDURE — 25000132 ZZH RX MED GY IP 250 OP 250 PS 637: Mod: GY | Performed by: INTERNAL MEDICINE

## 2017-05-01 PROCEDURE — 84484 ASSAY OF TROPONIN QUANT: CPT | Performed by: EMERGENCY MEDICINE

## 2017-05-01 PROCEDURE — 25000125 ZZHC RX 250: Performed by: EMERGENCY MEDICINE

## 2017-05-01 PROCEDURE — 83605 ASSAY OF LACTIC ACID: CPT | Performed by: INTERNAL MEDICINE

## 2017-05-01 PROCEDURE — 72040 X-RAY EXAM NECK SPINE 2-3 VW: CPT

## 2017-05-01 PROCEDURE — 99285 EMERGENCY DEPT VISIT HI MDM: CPT | Mod: 25

## 2017-05-01 PROCEDURE — 80320 DRUG SCREEN QUANTALCOHOLS: CPT | Performed by: EMERGENCY MEDICINE

## 2017-05-01 PROCEDURE — A9270 NON-COVERED ITEM OR SERVICE: HCPCS | Mod: GY | Performed by: INTERNAL MEDICINE

## 2017-05-01 PROCEDURE — 70450 CT HEAD/BRAIN W/O DYE: CPT

## 2017-05-01 PROCEDURE — 85610 PROTHROMBIN TIME: CPT | Performed by: EMERGENCY MEDICINE

## 2017-05-01 PROCEDURE — 99220 ZZC INITIAL OBSERVATION CARE,LEVL III: CPT | Performed by: INTERNAL MEDICINE

## 2017-05-01 PROCEDURE — 36415 COLL VENOUS BLD VENIPUNCTURE: CPT | Performed by: EMERGENCY MEDICINE

## 2017-05-01 PROCEDURE — 96375 TX/PRO/DX INJ NEW DRUG ADDON: CPT

## 2017-05-01 PROCEDURE — 25000128 H RX IP 250 OP 636: Performed by: INTERNAL MEDICINE

## 2017-05-01 PROCEDURE — 81001 URINALYSIS AUTO W/SCOPE: CPT | Mod: XU | Performed by: EMERGENCY MEDICINE

## 2017-05-01 PROCEDURE — 85025 COMPLETE CBC W/AUTO DIFF WBC: CPT | Performed by: EMERGENCY MEDICINE

## 2017-05-01 PROCEDURE — 36415 COLL VENOUS BLD VENIPUNCTURE: CPT | Performed by: INTERNAL MEDICINE

## 2017-05-01 PROCEDURE — 80053 COMPREHEN METABOLIC PANEL: CPT | Performed by: EMERGENCY MEDICINE

## 2017-05-01 PROCEDURE — 87040 BLOOD CULTURE FOR BACTERIA: CPT | Mod: 91 | Performed by: INTERNAL MEDICINE

## 2017-05-01 RX ORDER — TOPIRAMATE 25 MG/1
25 TABLET, FILM COATED ORAL 2 TIMES DAILY
Status: DISCONTINUED | OUTPATIENT
Start: 2017-05-01 | End: 2017-05-02 | Stop reason: HOSPADM

## 2017-05-01 RX ORDER — LORAZEPAM 2 MG/ML
1 INJECTION INTRAMUSCULAR ONCE
Status: COMPLETED | OUTPATIENT
Start: 2017-05-01 | End: 2017-05-01

## 2017-05-01 RX ORDER — LANOLIN ALCOHOL/MO/W.PET/CERES
100 CREAM (GRAM) TOPICAL DAILY
Status: DISCONTINUED | OUTPATIENT
Start: 2017-05-02 | End: 2017-05-02 | Stop reason: HOSPADM

## 2017-05-01 RX ORDER — ACETAMINOPHEN 325 MG/1
650 TABLET ORAL EVERY 6 HOURS PRN
Status: CANCELLED | OUTPATIENT
Start: 2017-05-01

## 2017-05-01 RX ORDER — NORETHINDRONE ACETATE AND ETHINYL ESTRADIOL 1; 5 MG/1; UG/1
1 TABLET ORAL DAILY
COMMUNITY
End: 2017-06-06

## 2017-05-01 RX ORDER — MULTIPLE VITAMINS W/ MINERALS TAB 9MG-400MCG
1 TAB ORAL DAILY
Status: DISCONTINUED | OUTPATIENT
Start: 2017-05-02 | End: 2017-05-02 | Stop reason: HOSPADM

## 2017-05-01 RX ORDER — METOPROLOL TARTRATE 25 MG/1
25 TABLET, FILM COATED ORAL 2 TIMES DAILY
Status: DISCONTINUED | OUTPATIENT
Start: 2017-05-01 | End: 2017-05-02 | Stop reason: HOSPADM

## 2017-05-01 RX ORDER — NALOXONE HYDROCHLORIDE 0.4 MG/ML
.1-.4 INJECTION, SOLUTION INTRAMUSCULAR; INTRAVENOUS; SUBCUTANEOUS
Status: DISCONTINUED | OUTPATIENT
Start: 2017-05-01 | End: 2017-05-02 | Stop reason: HOSPADM

## 2017-05-01 RX ORDER — LORAZEPAM 2 MG/ML
1-2 INJECTION INTRAMUSCULAR EVERY 30 MIN PRN
Status: DISCONTINUED | OUTPATIENT
Start: 2017-05-01 | End: 2017-05-02 | Stop reason: HOSPADM

## 2017-05-01 RX ORDER — SODIUM CHLORIDE 9 MG/ML
INJECTION, SOLUTION INTRAVENOUS CONTINUOUS
Status: DISCONTINUED | OUTPATIENT
Start: 2017-05-01 | End: 2017-05-02 | Stop reason: HOSPADM

## 2017-05-01 RX ORDER — FOLIC ACID 1 MG/1
1 TABLET ORAL DAILY
Status: DISCONTINUED | OUTPATIENT
Start: 2017-05-02 | End: 2017-05-02 | Stop reason: HOSPADM

## 2017-05-01 RX ORDER — LORAZEPAM 1 MG/1
1-2 TABLET ORAL EVERY 30 MIN PRN
Status: DISCONTINUED | OUTPATIENT
Start: 2017-05-01 | End: 2017-05-02 | Stop reason: HOSPADM

## 2017-05-01 RX ORDER — IBUPROFEN 400 MG/1
400 TABLET, FILM COATED ORAL ONCE
Status: COMPLETED | OUTPATIENT
Start: 2017-05-01 | End: 2017-05-01

## 2017-05-01 RX ORDER — MONTELUKAST SODIUM 10 MG/1
10 TABLET ORAL AT BEDTIME
Status: DISCONTINUED | OUTPATIENT
Start: 2017-05-01 | End: 2017-05-02 | Stop reason: HOSPADM

## 2017-05-01 RX ORDER — SODIUM CHLORIDE 9 MG/ML
1000 INJECTION, SOLUTION INTRAVENOUS CONTINUOUS
Status: DISCONTINUED | OUTPATIENT
Start: 2017-05-01 | End: 2017-05-01

## 2017-05-01 RX ORDER — ACETAMINOPHEN 325 MG/1
650 TABLET ORAL EVERY 4 HOURS PRN
Status: CANCELLED | OUTPATIENT
Start: 2017-05-01

## 2017-05-01 RX ORDER — LOSARTAN POTASSIUM 50 MG/1
100 TABLET ORAL DAILY
Status: DISCONTINUED | OUTPATIENT
Start: 2017-05-02 | End: 2017-05-02 | Stop reason: HOSPADM

## 2017-05-01 RX ADMIN — SODIUM CHLORIDE: 9 INJECTION, SOLUTION INTRAVENOUS at 19:39

## 2017-05-01 RX ADMIN — MONTELUKAST SODIUM 10 MG: 10 TABLET, FILM COATED ORAL at 21:33

## 2017-05-01 RX ADMIN — SODIUM CHLORIDE 300 ML: 9 INJECTION, SOLUTION INTRAVENOUS at 22:18

## 2017-05-01 RX ADMIN — IBUPROFEN 400 MG: 400 TABLET ORAL at 19:45

## 2017-05-01 RX ADMIN — LORAZEPAM 1 MG: 2 INJECTION, SOLUTION INTRAMUSCULAR; INTRAVENOUS at 16:10

## 2017-05-01 RX ADMIN — METOPROLOL TARTRATE 25 MG: 25 TABLET, FILM COATED ORAL at 21:33

## 2017-05-01 RX ADMIN — SODIUM CHLORIDE 1000 ML: 9 INJECTION, SOLUTION INTRAVENOUS at 12:40

## 2017-05-01 RX ADMIN — TOPIRAMATE 25 MG: 25 TABLET, FILM COATED ORAL at 21:33

## 2017-05-01 RX ADMIN — FOLIC ACID: 5 INJECTION, SOLUTION INTRAMUSCULAR; INTRAVENOUS; SUBCUTANEOUS at 13:44

## 2017-05-01 ASSESSMENT — ENCOUNTER SYMPTOMS
NECK PAIN: 0
ABDOMINAL PAIN: 0
ARTHRALGIAS: 0
LIGHT-HEADEDNESS: 1
BACK PAIN: 0
CONFUSION: 1

## 2017-05-01 NOTE — PROGRESS NOTES
Clinic Care Coordination Contact  Care Team Conversations    email sent to home care RN & SW Penrose, Suzanne Thibodeaux  MRN:   [id number]  Female, 66 year old, 1950    This patient has been referred to home care as of 04/27/2017.  Patient in ED today (05/01/2017) following welfare check by [place] [employer]. She had fallen & was not able to get up. She is drinking & not taking her meds correctly per POA.  Keep me posted on how I can assist.  [email or web address] [email or web address]  [phone]  Clinic  for [facility], [facility], [facility] & [facility]  What is Care Coordination?  [facility] Care Coordination services are available to people in complex situations, for example medical, social, or financial. The care coordinator, a  or nurse, works with the patient and their doctor or primary care provider to determine health goals, obtain resources, achieve outcomes, and develop plans to coordinate care across settings.      The information transmitted in this message (including any attachments) is intended only for the person or persons to whom it is addressed, and may contain material that is confidential and/or privileged. Any review, retransmission, dissemination or other use of the information contained herein by persons or entities other than the intended recipient is prohibited. If you have received this message in error, please notify the sender immediately and delete this message.

## 2017-05-01 NOTE — IP AVS SNAPSHOT
[place] [address] [facility]    [address] [place] [address]    Phone:  [phone]                                       After Visit Summary   5/1/2017    Melissa Jean Penrose    MRN: [id number]           After Visit Summary Signature Page     I have received my discharge instructions, and my questions have been answered. I have discussed any challenges I see with this plan with the nurse or doctor.    ..........................................................................................................................................  Patient/Patient Representative Signature      ..........................................................................................................................................  Patient Representative Print Name and Relationship to Patient    ..................................................               ................................................  Date                                            Time    ..........................................................................................................................................  Reviewed by Signature/Title    ...................................................              ..............................................  Date                                                            Time

## 2017-05-01 NOTE — IP AVS SNAPSHOT
` [place] [address] [facility]: [phone]            Medication Administration Report for Penrose, Melissa Jean as of 05/02/17 1803   Legend:    Given Hold Not Given Due Canceled Entry Other Actions    Time Time (Time) Time  Time-Action       Inactive    Active    Linked        Medications 04/26/17 04/27/17 04/28/17 04/29/17 04/30/17 05/01/17 05/02/17    0.9% sodium chloride infusion  Rate: 100 mL/hr Freq: CONTINUOUS Route: IV  Start: 05/01/17 1815         1939 ( )-New Bag        0322 ( )-New Bag       1348 ( )-New Bag           bacitracin ointment  Freq: EVERY 1 HOUR PRN Route: Top  PRN Reason: other  PRN Comment: topical dermatitis  Start: 05/02/17 0721   Admin Instructions: Apply to affected area           0936 ( )-Given       1133 ( )-Given           folic acid (FOLVITE) tablet 1 mg  Dose: 1 mg Freq: DAILY Route: PO  Start: 05/02/17 0900   Admin Instructions: If patient is unable to tolerate oral folic acid, an intravenous dose of folic acid should be considered.           0937 (1 mg)-Given           LORazepam (ATIVAN) tablet 1-2 mg  Dose: 1-2 mg Freq: EVERY 30 MIN PRN Route: PO  PRN Reason: other  PRN Comment: per CIWA-Ar score  Start: 05/01/17 1820   Admin Instructions: Oral dosing is the preferred route of administration.  Dose according to CIWA-Ar score:    For CIWA-Ar Score LESS THAN OR EQUAL TO 7:  ~ NO LORazepam (ATIVAN) is to be administered and  ~ repeat CIWA-Ar scale in 4 hours and PRN    For CIWA-Ar Score 8-12:   ~ give LORazepam (ATIVAN) 1 mg PO or IV and  ~ repeat CIWA-Ar scale in 1 hour     For CIWA-Ar Score 13-15:  ~ give LORazepam (ATIVAN) 2 mg PO or IV   ~ and repeat CIWA-Ar scale in 1 hour    For CIWA-Ar Score GREATER THAN OR EQUAL TO 16:   ~ give LORazepam (ATIVAN) 2 mg PO or IV and   ~ repeat CIWA-Ar scale in 30 minutes    Doses should be withheld for nystagmus, sedation, ataxia, dysarthria or respiratory rate less than 12. If dose is being held more than once,  provider must be notified.              Or  LORazepam (ATIVAN) injection 1-2 mg  Dose: 1-2 mg Freq: EVERY 30 MIN PRN Route: IV  PRN Reason: other  PRN Comment: per CIWA-Ar score  Start: 05/01/17 1820   Admin Instructions: Oral dosing is the preferred route of administration.    Dose according to CIWA-Ar Score:    For CIWA-Ar Score LESS THAN OR EQUAL TO 7:   ~ NO LORazepam (ATIVAN) is to be administered  and  ~ repeat CIWA-Ar scale in 4 hours and PRN    For CIWA-Ar Score 8-12:  ~ give LORazepam (ATIVAN) 1 mg PO or IV and  ~ repeat CIWA-AR scale in 1 hour     For CIWA-Ar Score 13-15:  ~ give LORazepam (ATIVAN) 2 mg PO or IV and   ~ repeat CIWA-Ar scale in 1 hour    For CIWA-Ar Score GREATER THAN OR EQUAL TO 16:  ~ give LORazepam (ATIVAN) 2 mg PO or IV and  ~ repeat CIWA-Ar scale in 30 minutes    Doses should be withheld for nystagmus, sedation, ataxia, dysarthria or respiratory rate less than 12. If dose is being held more than once, provider must be notified.  For IV PUSH: Dilute with equal volume of NS.               losartan (COZAAR) tablet 100 mg  Dose: 100 mg Freq: DAILY Route: PO  Start: 05/02/17 0900   Admin Instructions: Hold if SBP< 110           0937 (100 mg)-Given           metoprolol (LOPRESSOR) tablet 25 mg  Dose: 25 mg Freq: 2 TIMES DAILY Route: PO  Start: 05/01/17 2100   Admin Instructions: Hold if SBP < 110 or HR< 60          2133 (25 mg)-Given        0937 (25 mg)-Given       [ ] 2100           montelukast (SINGULAIR) tablet 10 mg  Dose: 10 mg Freq: AT BEDTIME Route: PO  Start: 05/01/17 2200         2133 (10 mg)-Given        [ ] 2200           multivitamin, therapeutic with minerals (THERA-VIT-M) tablet 1 tablet  Dose: 1 tablet Freq: DAILY Route: PO  Start: 05/02/17 0900   Admin Instructions: If patient is unable to tolerate oral multivitamins an intravenous dose of multivitamins should be considered.           0937 (1 tablet)-Given           naloxone (NARCAN) injection 0.1-0.4 mg  Dose: 0.1-0.4 mg  Freq: EVERY 2 MIN PRN Route: IV  PRN Reason: opioid reversal  Start: 05/01/17 1820   Admin Instructions: For respiratory rate LESS than or EQUAL to 8.  Partial reversal dose:  0.1 mg titrated q 2 minutes for Analgesia Side Effects Monitoring Sedation Level of 3 (frequently drowsy, arousable, drifts to sleep during conversation).Full reversal dose:  0.4 mg bolus for Analgesia Side Effects Monitoring Sedation Level of 4 (somnolent, minimal or no response to stimulation).               potassium chloride (KLOR-CON) Packet 20-40 mEq  Dose: 20-40 mEq Freq: EVERY 2 HOURS PRN Route: ORAL OR FEED  PRN Reason: potassium supplementation  Start: 05/02/17 0934   Admin Instructions: Use if unable to tolerate tablets.  If Serum K+ 3.0-3.3, dose = 60 mEq po total dose (40 mEq x1 followed in 2 hours by 20 mEq x1). Recheck K+ level 4 hours after dose and the next AM.  If Serum K+ 2.5-2.9, dose = 80 mEq po total dose (40 mEq Q2H x2). Recheck K+ level 4 hours after dose and the next AM.  If Serum K+ less than 2.5, See IV order.  Dissolve packet contents in 4-8 ounces of cold water or juice.               potassium chloride 10 mEq in 100 mL intermittent infusion  Dose: 10 mEq Freq: EVERY 1 HOUR PRN Route: IV  PRN Reason: potassium supplementation  Start: 05/02/17 0934   Admin Instructions: Infuse via PERIPHERAL LINE or CENTRAL LINE. Use for central line replacement if patient weight less than 65 kg, if patient is on TPN with high potassium content or if unit does not stock 20 mEq bags.   If Serum K+ 3.0-3.3, dose = 10 mEq/hr x4 doses (40 mEq IV total dose). Recheck K+ level 2 hours after dose and the next AM.   If Serum K+ less than 3.0, dose = 10 mEq/hr x6 doses (60 mEq IV total dose). Recheck K+ level 2 hours after dose and the next AM.               potassium chloride 10 mEq in 100 mL intermittent infusion with 10 mg lidocaine  Dose: 10 mEq Freq: EVERY 1 HOUR PRN Route: IV  PRN Reason: potassium supplementation  Start: 05/02/17 0934    Admin Instructions: Infuse via PERIPHERAL LINE. Use potassium with lidocaine for pain with peripheral administration.  If Serum K+ 3.0-3.3, dose = 10 mEq/hr x4 doses (40 mEq IV total dose). Recheck K+ level 2 hours after dose and the next AM.  If Serum K+ less than 3.0, dose = 10 mEq/hr x6 doses (60 mEq IV total dose). Recheck K+ level 2 hours after dose and the next AM.               potassium chloride 20 mEq in 50 mL intermittent infusion  Dose: 20 mEq Freq: EVERY 1 HOUR PRN Route: IV  PRN Reason: potassium supplementation  Start: 05/02/17 0934   Admin Instructions: Infuse via CENTRAL LINE Only. May need EKG if less than 65 kg or on TPN - Max rate is 0.3 mEq/kg/hr for patients not on EKG monitoring.   If Serum K+ 3.0-3.3, dose = 20 mEq/hr x2 doses (40 mEq IV total dose). Recheck K+ level 2 hours after dose and the next AM.  If Serum K+ less than 3.0, dose = 20 mEq/hr x3 doses (60 mEq IV total dose). Recheck K+ level 2 hours after dose and the next AM.               potassium chloride SA (K-DUR/KLOR-CON M) CR tablet 20-40 mEq  Dose: 20-40 mEq Freq: EVERY 2 HOURS PRN Route: PO  PRN Reason: potassium supplementation  Start: 05/02/17 0934   Admin Instructions: Use if able to take PO.   If Serum K+ 3.0-3.3, dose = 60 mEq po total dose (40 mEq x1 followed in 2 hours by 20 mEq x1). Recheck K+ level 4 hours after dose and the next AM.  If Serum K+ 2.5-2.9, dose = 80 mEq po total dose (40 mEq Q2H x2). Recheck K+ level 4 hours after dose and the next AM.  If Serum K+ less than 2.5, See IV order.  DO NOT CRUSH           1121 (40 mEq)-Given       1416 (20 mEq)-Given           sodium chloride (PF) 0.9% PF flush 3 mL  Dose: 3 mL Freq: EVERY 8 HOURS Route: IK  Start: 05/01/17 1237   Admin Instructions: And Q1H PRN, to lock peripheral IV dormant line.          (1943)-Not Given       (2027)-Not Given        (0536)-Not Given       (1159)-Not Given       [ ] 2037           sodium chloride (PF) 0.9% PF flush 3 mL  Dose: 3 mL Freq:  EVERY 1 HOUR PRN Route: IK  PRN Reason: line flush  PRN Comment: for peripheral IV flush post IV meds  Start: 05/01/17 1234              thiamine tablet 100 mg  Dose: 100 mg Freq: DAILY Route: PO  Start: 05/02/17 0900   End: 05/07/17 0859   Admin Instructions: Administer first dose as soon as order set is initiated unless given in ED.   If patient is unable to tolerate oral thiamine, an intravenous dose of thiamine should be considered.           0936 (100 mg)-Given           topiramate (TOPAMAX) tablet 25 mg  Dose: 25 mg Freq: 2 TIMES DAILY Route: PO  Indications of Use: MIGRAINE  Start: 05/01/17 2100         2133 (25 mg)-Given        0937 (25 mg)-Given       [ ] 2100          Future Medications  Medications 04/26/17 04/27/17 04/28/17 04/29/17 04/30/17 05/01/17 05/02/17       mirtazapine (REMERON) tablet TABS 7.5 mg  Dose: 7.5 mg Freq: AT BEDTIME Route: PO  Start: 05/02/17 2200          [ ] 2200          Completed Medications  Medications 04/26/17 04/27/17 04/28/17 04/29/17 04/30/17 05/01/17 05/02/17         Dose: 300 mL Freq: ONCE Route: IV  Last Dose: 300 mL (05/01/17 2218)  Start: 05/01/17 2215   End: 05/01/17 2318         2218 (300 mL)-New Bag              Dose: 1,000 mL Freq: ONCE Route: IV  Last Dose: Stopped (05/01/17 1505)  Start: 05/01/17 1236   End: 05/01/17 1505         1240 (1,000 mL)-New Bag       1505-Stopped              Freq: ONCE Route: IV  Last Dose: Stopped (05/01/17 1506)  Start: 05/01/17 1237   End: 05/01/17 1506         1344 ( )-New Bag       1506-Stopped              Dose: 400 mg Freq: ONCE Route: PO  Start: 05/01/17 1915   End: 05/01/17 1945         1945 (400 mg)-Given              Dose: 1 mg Freq: ONCE Route: IV  Start: 05/01/17 1604   End: 05/01/17 1610   Admin Instructions: For IV PUSH: Dilute with equal volume of NS.          1610 (1 mg)-Given           Discontinued Medications  Medications 04/26/17 04/27/17 04/28/17 04/29/17 04/30/17 05/01/17 05/02/17         Rate: 125 mL/hr Freq:  CONTINUOUS Route: IV  Start: 05/01/17 1237   End: 05/01/17 1934   Admin Instructions: Administer after the bolus.                 1934-Med Discontinued

## 2017-05-01 NOTE — PROGRESS NOTES
Clinic Care Coordination Contact  Care Team Conversations    Clinic Care Coordination Contact  Care Team Conversations        Clinical Data: documentation reflects call with chana's POA & [facility] intake department  Info from call with POA  1) Clinic Care CoordinatorLONDON notified that patient is in ED & that plan is for patient to return to home; patient brought in by EMS due to fall  2) POA reported she had called [place] police as patient was not answering her phone  3) POA stated that patient had falling out with [facility] was not having home care services in her home  4) POA's  has been going over to patient's home to help with IADLS as patient allows  5) patient is continuing to drink; POA believes it is out of loneliness & that she does not recall or know how much she is drinking related to BI; patient drives once to month to [employer] & at same time gets liquor; patient talks about talking with someone at liquor store so POA is thinking trips to liquor store is social as much as to buy  6) patient is not using a med box or any system to know if she is taking her meds correctly or not  7) patient was seen at [facility] in BI clinic on 04/26/2017 & by PCP on 04/27/2017 which lead to order for home care  8) patient did better for 3 weeks last summer when she was in jail as she was taking her meds properly & not drinking; for NALLELY, patient is determined to be memory care which leads to locked unit; patient does match physically & in some aspects to memory care population; POA lives in [place] & is open to ALFs in [facility]  9) discusison that [facility] for skilled services is short term; patient needs long term assistance; this is for ongoing med management as well as companionship & assistance with IADLs; Clinic Care CoordinatorLONDON suggested [facility] at [phone] or [facility] at [phone]    Call with home care  1) home care made aware of ED visit  2) home care will try  to contact patient on 05/02/2017 to schedule initial home care visit        Plan:      POA to outreach to Clinic Care Coordinator, SW as needed  Clinic Care Coordinator, SW & home care to collaborate on patient's needs.

## 2017-05-01 NOTE — TELEPHONE ENCOUNTER
Called [facility] and left message for Dr. Mathew's nurse to make sure they received results and will discuss with patient.  Mariel Richardson RN   [facility] - Triage

## 2017-05-01 NOTE — TELEPHONE ENCOUNTER
MRI was not ordered by me, or seen patient for this. It was ordered by Eleuterio Ro MD, looks like [facility]. results needs to go to him.
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
## 2017-05-01 NOTE — IP AVS SNAPSHOT
MRN:[id number]                      After Visit Summary   5/1/2017    Melissa Jean Penrose    MRN: [id number]           Thank you!     Thank you for choosing [facility] for your care. Our goal is always to provide you with excellent care. Hearing back from our patients is one way we can continue to improve our services. Please take a few minutes to complete the written survey that you may receive in the mail after you visit with us. Thank you!        Patient Information     Date Of Birth          1950        Designated Caregiver       Most Recent Value    Caregiver    Will someone help with your care after discharge? yes    Name of designated caregiver Arranged through [employer]      About your hospital stay     You were admitted on:  May 1, 2017 You last received care in the:  [facility] [address] [facility]    You were discharged on:  May 2, 2017        Reason for your hospital stay       Alcohol abuse, alcohol withdrawal, mood disorder, fall with facial abrasions                  Who to Call     For medical emergencies, please call 911.  For non-urgent questions about your medical care, please call your primary care provider or clinic, [phone]          Attending Provider     Provider Specialty    Trigger, Conor Acevedo MD Emergency Medicine    [facility]       Primary Care Provider Office Phone # Fax #    Qamar Zavala [phone]       [facility] [place] [address]  [place] [address]        After Care Instructions     Activity - Up ad claudia           Advance Diet as Tolerated       Follow this diet upon discharge:       Regular Diet Adult            General info for SNF       Length of Stay Estimate: Short Term Care: Estimated # of Days <30  Condition at Discharge: Improving  Level of care:skilled   Rehabilitation Potential: Excellent  Admission H&P remains valid and up-to-date: Yes  Recent  "Chemotherapy: N/A  Use Nursing Home Standing Orders: N/A            Seizure precautions                 Follow-up Appointments     Follow Up and recommended labs and tests       With outpatient primary care doctor after psychiatry discharge                  Pending Results     Date and Time Order Name Status Description    5/1/2017 2205 Blood culture Preliminary     5/1/2017 2205 Blood culture Preliminary             Statement of Approval     Ordered          05/02/17 1720  I have reviewed and agree with all the recommendations and orders detailed in this document.  EFFECTIVE NOW     Approved and electronically signed by:  Suzanne Horne MD             Admission Information     Date & Time Provider Department Dept. Phone    5/1/2017 Rimma Schrader MD [place] [address] [facility] [phone]      Your Vitals Were     Blood Pressure Pulse Temperature Respirations Height Weight    117/71 (BP Location: Right arm) 84 98.4  F (36.9  C) (Oral) 18 1.803 m (5' 11\") 102.7 kg (226 lb 6.6 oz)    Pulse Oximetry BMI (Body Mass Index)                96% 31.58 kg/m2          [employer] Information     [employer] gives you secure access to your electronic health record. If you see a primary care provider, you can also send messages to your care team and make appointments. If you have questions, please call your primary care clinic.  If you do not have a primary care provider, please call [phone] and they will assist you.        Care EveryWhere ID     This is your Care EveryWhere ID. This could be used by other organizations to access your [facility] medical records  [id number]           Review of your medicines      START taking        Dose / Directions    bacitracin 500 UNIT/GM Oint   Used for:  Abrasion, face w/o infection        Apply topically every hour as needed for other (topical dermatitis)   Refills:  0       folic acid 1 MG tablet   Commonly known as:  FOLVITE   Used for:  Alcoholism " /alcohol abuse (H)        Dose:  1 mg   Take 1 tablet (1 mg) by mouth daily   Quantity:  30 tablet   Refills:  0       thiamine 100 MG tablet   Used for:  Alcoholism /alcohol abuse (H)        Dose:  100 mg   Take 1 tablet (100 mg) by mouth daily   Quantity:  5 tablet   Refills:  0         CONTINUE these medicines which have NOT CHANGED        Dose / Directions    acetaminophen 325 MG tablet   Commonly known as:  TYLENOL   Used for:  Episodic tension-type headache, not intractable        Dose:  650 mg   Take 2 tablets (650 mg) by mouth every 6 hours as needed for mild pain   Quantity:  100 tablet   Refills:  0       azelastine 0.1 % spray   Commonly known as:  ASTELIN   Notes to Patient:  Resume at discharge        Dose:  2 spray   Spray 2 sprays into both nostrils every morning   Refills:  0       losartan 100 MG tablet   Commonly known as:  COZAAR        Dose:  1 tablet   Take 1 tablet by mouth daily   Refills:  0       MELATONIN PO        Dose:  3 mg   Take 3 mg by mouth nightly as needed Reported on 4/27/2017   Refills:  0       MELOXICAM PO        Dose:  15 mg   Take 15 mg by mouth daily as needed (migraine)   Refills:  0       METOPROLOL TARTRATE PO        Dose:  25 mg   Take 25 mg by mouth 2 times daily   Refills:  0       mometasone 50 MCG/ACT spray   Commonly known as:  NASONEX        Dose:  2 spray   Spray 2 sprays into both nostrils At Bedtime   Refills:  0       multivitamin, therapeutic Tabs tablet        Dose:  1 tablet   Take 1 tablet by mouth At Bedtime   Refills:  0       norethindrone-ethinyl estradiol 1-5 MG-MCG per tablet   Commonly known as:  FEMHRT 1/5   Notes to Patient:  Resume at discharge          Dose:  1 tablet   Take 1 tablet by mouth daily   Refills:  0       NORTRIPTYLINE HCL PO        Dose:  25 mg   Take 25 mg by mouth 2 times daily as needed (migraine) Reported on 4/27/2017   Refills:  0       OMEPRAZOLE PO        Dose:  20 mg   Take 20 mg by mouth daily as needed   Refills:  0        SINGULAIR PO        Dose:  10 mg   Take 10 mg by mouth At Bedtime   Refills:  0       SUMAtriptan 100 MG tablet   Commonly known as:  IMITREX        Dose:  1 tablet   Take 1 tablet by mouth at onset of headache (Take every 2 hours as needed. Max 200mg in 24 hours.) Reported on 4/27/2017   Refills:  3       TOPAMAX PO   Indication:  Migraine Headache        Dose:  25 mg   Take 25 mg by mouth 2 times daily   Refills:  0       VITAMIN C PO        Dose:  500 mg   Take 500 mg by mouth At Bedtime   Refills:  0         STOP taking     FLEXERIL PO           NEURONTIN PO           ULTRAM PO                Where to get your medicines      Some of these will need a paper prescription and others can be bought over the counter. Ask your nurse if you have questions.     You don't need a prescription for these medications     bacitracin 500 UNIT/GM Oint    folic acid 1 MG tablet    thiamine 100 MG tablet                Protect others around you: Learn how to safely use, store and throw away your medicines at www.disposemymeds.org.             Medication List: This is a list of all your medications and when to take them. Check marks below indicate your daily home schedule. Keep this list as a reference.      Medications           Morning Afternoon Evening Bedtime As Needed    acetaminophen 325 MG tablet   Commonly known as:  TYLENOL   Take 2 tablets (650 mg) by mouth every 6 hours as needed for mild pain                                   azelastine 0.1 % spray   Commonly known as:  ASTELIN   Spray 2 sprays into both nostrils every morning   Notes to Patient:  Resume at discharge                                bacitracin 500 UNIT/GM Oint   Apply topically every hour as needed for other (topical dermatitis)   Last time this was given:  5/2/2017 11:33 AM                                   folic acid 1 MG tablet   Commonly known as:  FOLVITE   Take 1 tablet (1 mg) by mouth daily   Last time this was given:  1 mg on 5/2/2017  9:37 AM    Next Dose Due:  5/3/17 0900                                   losartan 100 MG tablet   Commonly known as:  COZAAR   Take 1 tablet by mouth daily   Last time this was given:  100 mg on 5/2/2017  9:37 AM   Next Dose Due:  5/3/17 0900                                   MELATONIN PO   Take 3 mg by mouth nightly as needed Reported on 4/27/2017                                   MELOXICAM PO   Take 15 mg by mouth daily as needed (migraine)                                   METOPROLOL TARTRATE PO   Take 25 mg by mouth 2 times daily   Last time this was given:  25 mg on 5/2/2017  9:37 AM   Next Dose Due:  5/2/17 2100                                      mometasone 50 MCG/ACT spray   Commonly known as:  NASONEX   Spray 2 sprays into both nostrils At Bedtime                                   multivitamin, therapeutic Tabs tablet   Take 1 tablet by mouth At Bedtime                                   norethindrone-ethinyl estradiol 1-5 MG-MCG per tablet   Commonly known as:  FEMHRT 1/5   Take 1 tablet by mouth daily   Notes to Patient:  Resume at discharge                                     NORTRIPTYLINE HCL PO   Take 25 mg by mouth 2 times daily as needed (migraine) Reported on 4/27/2017                                   OMEPRAZOLE PO   Take 20 mg by mouth daily as needed                                   SINGULAIR PO   Take 10 mg by mouth At Bedtime   Last time this was given:  10 mg on 5/1/2017  9:33 PM                                   SUMAtriptan 100 MG tablet   Commonly known as:  IMITREX   Take 1 tablet by mouth at onset of headache (Take every 2 hours as needed. Max 200mg in 24 hours.) Reported on 4/27/2017                                   thiamine 100 MG tablet   Take 1 tablet (100 mg) by mouth daily   Last time this was given:  100 mg on 5/2/2017  9:36 AM   Next Dose Due:  5/3/17 0900                                   TOPAMAX PO   Take 25 mg by mouth 2 times daily   Last time this was given:  25 mg on 5/2/2017  9:37  AM   Next Dose Due:  5/2/17 2100                                      VITAMIN C PO   Take 500 mg by mouth At Bedtime

## 2017-05-01 NOTE — TELEPHONE ENCOUNTER
Dr. Zavala, results from [facility] report:      [facility] [place]  [address]  [place] [address]  Phone: [phone]  Fax: [phone]    Referring Physician Information:  Eleuterio Mathew M.D.  [address]   [place] [address]  Phone: [phone]  Fax: [phone]  Patient: Melissa Penrose  YOB: 1950  Sex: Female  Phone: [phone]    [facility] MRN: [id number]  Exam Date: 10/07/2011  EXAM: MRI RIGHT ANKLE  CLINICAL INFORMATION:The patient is a 60-year-old female with right ankle pain. Evaluate for ligament injury or chondral abnormality.   TECHNICAL INFORMATION: Multiple 3 mm proton density and T2-weighted sagittal, coronal and transverse images as well as 4 mm sagittal STIR images were obtained. There are no prior studies available for comparison.  INTERPRETATION:STIR images of the distal tibia and distal fibula show no evidence for marrow edema or cortical injury. No definite osteochondral injuries of the talar dome or tibial plafond can be seen; however, there are areas of increased STIR signal intensity involving the posterior aspect of the talar dome and along the anteroinferior aspect of the talus. The findings may relate to areas of bony contusion or stress injury. No well-defined fracture is identified. Mild reactive bony changes within the calcaneus can be seen adjacent to the sinus tarsi. No other definite bony abnormalities of the calcaneus are seen; however, there is mild calcaneal spurring.   Increased STIR signal intensity involving the central and distal aspects of the cuboid can be seen on sagittal series 11, image 14. The findings may relate to contusion or stress change. No well-defined fracture is identified. Additional reactive marrow edema involving the proximal aspect of the fourth metatarsal can be seen. An element of degenerative or posttraumatic changes about the fourth TMT joint cannot be excluded. No other bony abnormalities of the midfoot  are seen.   A mild to moderate tibiotalar joint effusion is seen. There is a mild subtalar joint effusion. Mild fluid is noted within the sinus tarsi region. Minimal fluid is noted within the retrocalcaneal bursa. Nonspecific soft tissue edema and/or hemorrhage can be seen along the lateral and anterolateral aspects of the ankle.   The Achilles tendon and plantar fascia appear normal. No definite evidence for injury to the anterior or posterior tibialis tendons can be seen. The flexor and extensor tendons of the ankle appear normal. Mild tendinosis and tenosynovitis of the peroneus longus and brevis tendons can be seen along the posterior and inferior margins of the lateral malleolus. Flattening of the peroneus brevis can be seen on axial series 5, image 14. There is no evidence for peroneal tendon dislocation or peroneal tendon rupture.   Thickening and irregularity of the anterior talofibular, calcaneofibular and posterior talofibular ligaments can be seen, in keeping with a prior inversion sprain. A chronic incomplete deltoid sprain is seen on coronal series 10, image 17. No definite ligamentous injuries within the sinus tarsi can be seen.   No definite neurovascular abnormalities about the ankle are present. The tarsal tunnel region is within normal limits.   CONCLUSION:  1. Areas of stress change or contusion can be seen within the talus as well as within the cuboid. Additional degenerative versus posttraumatic changes about the fourth TMT joint can be seen. No well-defined areas of fracture or osteochondral injury are present.   2. Effusions of the tibiotalar and subtalar joints can be seen with fluid within the sinus tarsi region.   3. Tendinosis of the peroneus longus and brevis with mild tenosynovitis. No evidence for tendon rupture is seen.   4. Chronic-appearing sprain injuries of the lateral ligamentous complex and deltoid complex.   5. No definite neurovascular abnormalities about the ankle are seen.    AEC:gaurav      Electronically signed on 10/8/2011 9:29:00 AM by Mikel Cary M.D.

## 2017-05-01 NOTE — PHARMACY-ADMISSION MEDICATION HISTORY
Admission medication history interview status for the 5/1/2017  admission is complete. See EPIC admission navigator for prior to admission medications     Medication history source reliability:Good    Actions taken by pharmacist (provider contacted, etc): interview with patient and information from Care Everywhere     Additional medication history information not noted on PTA med list :None    Medication reconciliation/reorder completed by provider prior to medication history? No    Time spent in this activity: 20 min    Prior to Admission medications    Medication Sig Last Dose Taking? Auth Provider   Montelukast Sodium (SINGULAIR PO) Take 10 mg by mouth At Bedtime 4/30/2017 at hs Yes Unknown, Entered By History   norethindrone-ethinyl estradiol (FEMHRT 1/5) 1-5 MG-MCG per tablet Take 1 tablet by mouth daily 4/30/2017 at am Yes Unknown, Entered By History   OMEPRAZOLE PO Take 20 mg by mouth daily as needed prn Yes Unknown, Entered By History   TraMADol HCl (ULTRAM PO) Take 50 mg by mouth 3 times daily as needed for moderate to severe pain prn Yes Unknown, Entered By History   Cyclobenzaprine HCl (FLEXERIL PO) Take 10 mg by mouth At Bedtime 4/30/2017 at hs Yes Unknown, Entered By History   acetaminophen (TYLENOL) 325 MG tablet Take 2 tablets (650 mg) by mouth every 6 hours as needed for mild pain prn Yes Thuan Banda PA-C   MELOXICAM PO Take 15 mg by mouth daily as needed (migraine)  prn Yes Unknown, Entered By History   METOPROLOL TARTRATE PO Take 25 mg by mouth 2 times daily 4/30/2017 at pm Yes Unknown, Entered By History   NORTRIPTYLINE HCL PO Take 25 mg by mouth 2 times daily as needed (migraine) Reported on 4/27/2017 prn Yes Unknown, Entered By History   MELATONIN PO Take 3 mg by mouth nightly as needed Reported on 4/27/2017 prn Yes Unknown, Entered By History   losartan (COZAAR) 100 MG tablet Take 1 tablet by mouth daily  5/1/2017 at am Yes Reported, Patient   SUMAtriptan (IMITREX) 100 MG tablet  Take 1 tablet by mouth at onset of headache (Take every 2 hours as needed. Max 200mg in 24 hours.) Reported on 4/27/2017 prn Yes Reported, Patient   Ascorbic Acid (VITAMIN C PO) Take 500 mg by mouth At Bedtime  4/30/2017 at hs Yes Unknown, Entered By History   multivitamin, therapeutic (THERA-VIT) TABS Take 1 tablet by mouth At Bedtime  4/30/2017 at hs Yes Unknown, Entered By History   azelastine (ASTELIN) 0.1 % nasal spray Spray 2 sprays into both nostrils every morning  4/30/2017 at am Yes Unknown, Entered By History   mometasone (NASONEX) 50 MCG/ACT nasal spray Spray 2 sprays into both nostrils At Bedtime  4/30/2017 at hs Yes Unknown, Entered By History   Gabapentin (NEURONTIN PO) Take 300 mg by mouth daily as needed  prn Yes Unknown, Entered By History   Topiramate (TOPAMAX PO) Take 25 mg by mouth 2 times daily  4/30/2017 at pm Yes Unknown, Entered By History

## 2017-05-01 NOTE — ED NOTES
Bed: ED09  Expected date:   Expected time:   Means of arrival:   Comments:  [facility] 419 - syncope eta 1220

## 2017-05-02 ENCOUNTER — APPOINTMENT (OUTPATIENT)
Dept: GENERAL RADIOLOGY | Facility: CLINIC | Age: 67
End: 2017-05-02
Attending: INTERNAL MEDICINE
Payer: MEDICARE

## 2017-05-02 ENCOUNTER — HOSPITAL ENCOUNTER (INPATIENT)
Facility: CLINIC | Age: 67
LOS: 7 days | Discharge: HOME | DRG: 885 | End: 2017-05-09
Attending: PSYCHIATRY & NEUROLOGY | Admitting: PSYCHIATRY & NEUROLOGY
Payer: MEDICARE

## 2017-05-02 ENCOUNTER — CARE COORDINATION (OUTPATIENT)
Dept: CARE COORDINATION | Facility: CLINIC | Age: 67
End: 2017-05-02

## 2017-05-02 VITALS
HEART RATE: 84 BPM | WEIGHT: 226.41 LBS | HEIGHT: 71 IN | TEMPERATURE: 98.4 F | DIASTOLIC BLOOD PRESSURE: 71 MMHG | SYSTOLIC BLOOD PRESSURE: 117 MMHG | RESPIRATION RATE: 18 BRPM | BODY MASS INDEX: 31.7 KG/M2 | OXYGEN SATURATION: 96 %

## 2017-05-02 DIAGNOSIS — J30.1 ALLERGIC RHINITIS DUE TO POLLEN, UNSPECIFIED RHINITIS SEASONALITY: ICD-10-CM

## 2017-05-02 DIAGNOSIS — F33.2 SEVERE RECURRENT MAJOR DEPRESSION WITHOUT PSYCHOTIC FEATURES (H): Primary | ICD-10-CM

## 2017-05-02 DIAGNOSIS — S00.81XA ABRASION, FACE W/O INFECTION: ICD-10-CM

## 2017-05-02 DIAGNOSIS — G43.009 MIGRAINE WITHOUT AURA AND WITHOUT STATUS MIGRAINOSUS, NOT INTRACTABLE: ICD-10-CM

## 2017-05-02 DIAGNOSIS — G44.219 EPISODIC TENSION-TYPE HEADACHE, NOT INTRACTABLE: ICD-10-CM

## 2017-05-02 DIAGNOSIS — I10 HYPERTENSION GOAL BP (BLOOD PRESSURE) < 140/80: ICD-10-CM

## 2017-05-02 DIAGNOSIS — K21.9 GASTROESOPHAGEAL REFLUX DISEASE WITHOUT ESOPHAGITIS: ICD-10-CM

## 2017-05-02 PROBLEM — F32.A DEPRESSION: Status: ACTIVE | Noted: 2017-05-02

## 2017-05-02 LAB
ALBUMIN SERPL-MCNC: 3 G/DL (ref 3.4–5)
ALP SERPL-CCNC: 136 U/L (ref 40–150)
ALT SERPL W P-5'-P-CCNC: 81 U/L (ref 0–50)
ANION GAP SERPL CALCULATED.3IONS-SCNC: 10 MMOL/L (ref 3–14)
AST SERPL W P-5'-P-CCNC: 152 U/L (ref 0–45)
BILIRUB DIRECT SERPL-MCNC: 0.5 MG/DL (ref 0–0.2)
BILIRUB SERPL-MCNC: 1.3 MG/DL (ref 0.2–1.3)
BUN SERPL-MCNC: 9 MG/DL (ref 7–30)
CALCIUM SERPL-MCNC: 8.4 MG/DL (ref 8.5–10.1)
CHLORIDE SERPL-SCNC: 105 MMOL/L (ref 94–109)
CO2 SERPL-SCNC: 24 MMOL/L (ref 20–32)
CREAT SERPL-MCNC: 0.63 MG/DL (ref 0.52–1.04)
GFR SERPL CREATININE-BSD FRML MDRD: ABNORMAL ML/MIN/1.7M2
GLUCOSE SERPL-MCNC: 101 MG/DL (ref 70–99)
INTERPRETATION ECG - MUSE: NORMAL
LACTATE BLD-SCNC: 1 MMOL/L (ref 0.7–2.1)
POTASSIUM SERPL-SCNC: 3.1 MMOL/L (ref 3.4–5.3)
PROT SERPL-MCNC: 6.4 G/DL (ref 6.8–8.8)
SODIUM SERPL-SCNC: 139 MMOL/L (ref 133–144)
TROPONIN I SERPL-MCNC: 0.04 UG/L (ref 0–0.04)

## 2017-05-02 PROCEDURE — 12400006 ZZH R&B MH INTERMEDIATE

## 2017-05-02 PROCEDURE — 40000893 ZZH STATISTIC PT IP EVAL DEFER: Performed by: PHYSICAL THERAPIST

## 2017-05-02 PROCEDURE — 83605 ASSAY OF LACTIC ACID: CPT | Performed by: INTERNAL MEDICINE

## 2017-05-02 PROCEDURE — 84484 ASSAY OF TROPONIN QUANT: CPT | Performed by: INTERNAL MEDICINE

## 2017-05-02 PROCEDURE — 25000128 H RX IP 250 OP 636: Performed by: INTERNAL MEDICINE

## 2017-05-02 PROCEDURE — 36415 COLL VENOUS BLD VENIPUNCTURE: CPT | Performed by: INTERNAL MEDICINE

## 2017-05-02 PROCEDURE — 25000125 ZZHC RX 250: Performed by: INTERNAL MEDICINE

## 2017-05-02 PROCEDURE — A9270 NON-COVERED ITEM OR SERVICE: HCPCS | Mod: GY | Performed by: INTERNAL MEDICINE

## 2017-05-02 PROCEDURE — A9270 NON-COVERED ITEM OR SERVICE: HCPCS | Mod: GY | Performed by: PSYCHIATRY & NEUROLOGY

## 2017-05-02 PROCEDURE — 25000132 ZZH RX MED GY IP 250 OP 250 PS 637: Mod: GY | Performed by: INTERNAL MEDICINE

## 2017-05-02 PROCEDURE — 71020 XR CHEST 2 VW: CPT

## 2017-05-02 PROCEDURE — 25000132 ZZH RX MED GY IP 250 OP 250 PS 637: Mod: GY | Performed by: PSYCHIATRY & NEUROLOGY

## 2017-05-02 PROCEDURE — G0378 HOSPITAL OBSERVATION PER HR: HCPCS

## 2017-05-02 PROCEDURE — 99207 ZZC CDG-CODE CATEGORY CHANGED: CPT | Performed by: INTERNAL MEDICINE

## 2017-05-02 PROCEDURE — 80076 HEPATIC FUNCTION PANEL: CPT | Performed by: INTERNAL MEDICINE

## 2017-05-02 PROCEDURE — 99222 1ST HOSP IP/OBS MODERATE 55: CPT | Performed by: PSYCHIATRY & NEUROLOGY

## 2017-05-02 PROCEDURE — 80048 BASIC METABOLIC PNL TOTAL CA: CPT | Performed by: INTERNAL MEDICINE

## 2017-05-02 PROCEDURE — 99217 ZZC OBSERVATION CARE DISCHARGE: CPT | Performed by: INTERNAL MEDICINE

## 2017-05-02 RX ORDER — TOPIRAMATE 25 MG/1
25 TABLET, FILM COATED ORAL 2 TIMES DAILY
Status: DISCONTINUED | OUTPATIENT
Start: 2017-05-02 | End: 2017-05-09 | Stop reason: HOSPADM

## 2017-05-02 RX ORDER — LANOLIN ALCOHOL/MO/W.PET/CERES
100 CREAM (GRAM) TOPICAL DAILY
Qty: 5 TABLET | Refills: 0 | Status: ON HOLD | DISCHARGE
Start: 2017-05-02 | End: 2017-05-08

## 2017-05-02 RX ORDER — LANOLIN ALCOHOL/MO/W.PET/CERES
100 CREAM (GRAM) TOPICAL DAILY
Status: DISCONTINUED | OUTPATIENT
Start: 2017-05-03 | End: 2017-05-09 | Stop reason: HOSPADM

## 2017-05-02 RX ORDER — AZELASTINE 1 MG/ML
2 SPRAY, METERED NASAL EVERY MORNING
Status: DISCONTINUED | OUTPATIENT
Start: 2017-05-03 | End: 2017-05-09 | Stop reason: HOSPADM

## 2017-05-02 RX ORDER — GINSENG 100 MG
CAPSULE ORAL
Status: DISCONTINUED | OUTPATIENT
Start: 2017-05-02 | End: 2017-05-09 | Stop reason: HOSPADM

## 2017-05-02 RX ORDER — LOSARTAN POTASSIUM 100 MG/1
100 TABLET ORAL DAILY
Status: DISCONTINUED | OUTPATIENT
Start: 2017-05-03 | End: 2017-05-09 | Stop reason: HOSPADM

## 2017-05-02 RX ORDER — MULTIVITAMIN,THERAPEUTIC
1 TABLET ORAL AT BEDTIME
Status: DISCONTINUED | OUTPATIENT
Start: 2017-05-02 | End: 2017-05-09 | Stop reason: HOSPADM

## 2017-05-02 RX ORDER — GINSENG 100 MG
CAPSULE ORAL
Status: DISCONTINUED | OUTPATIENT
Start: 2017-05-02 | End: 2017-05-02 | Stop reason: HOSPADM

## 2017-05-02 RX ORDER — MONTELUKAST SODIUM 10 MG/1
10 TABLET ORAL AT BEDTIME
Status: DISCONTINUED | OUTPATIENT
Start: 2017-05-02 | End: 2017-05-09 | Stop reason: HOSPADM

## 2017-05-02 RX ORDER — FOLIC ACID 1 MG/1
1 TABLET ORAL DAILY
Qty: 30 TABLET | Refills: 0 | Status: ON HOLD | DISCHARGE
Start: 2017-05-02 | End: 2017-05-08

## 2017-05-02 RX ORDER — POTASSIUM CL/LIDO/0.9 % NACL 10MEQ/0.1L
10 INTRAVENOUS SOLUTION, PIGGYBACK (ML) INTRAVENOUS
Status: DISCONTINUED | OUTPATIENT
Start: 2017-05-02 | End: 2017-05-02 | Stop reason: HOSPADM

## 2017-05-02 RX ORDER — MIRTAZAPINE 7.5 MG/1
7.5 TABLET, FILM COATED ORAL AT BEDTIME
Status: DISCONTINUED | OUTPATIENT
Start: 2017-05-02 | End: 2017-05-02 | Stop reason: HOSPADM

## 2017-05-02 RX ORDER — CELECOXIB 200 MG/1
200 CAPSULE ORAL 2 TIMES DAILY PRN
Status: DISCONTINUED | OUTPATIENT
Start: 2017-05-02 | End: 2017-05-09 | Stop reason: HOSPADM

## 2017-05-02 RX ORDER — POTASSIUM CHLORIDE 1500 MG/1
20-40 TABLET, EXTENDED RELEASE ORAL
Status: DISCONTINUED | OUTPATIENT
Start: 2017-05-02 | End: 2017-05-02 | Stop reason: HOSPADM

## 2017-05-02 RX ORDER — ACETAMINOPHEN 325 MG/1
650 TABLET ORAL EVERY 6 HOURS PRN
Status: DISCONTINUED | OUTPATIENT
Start: 2017-05-02 | End: 2017-05-09 | Stop reason: HOSPADM

## 2017-05-02 RX ORDER — GINSENG 100 MG
CAPSULE ORAL
Status: ON HOLD | DISCHARGE
Start: 2017-05-02 | End: 2017-05-08

## 2017-05-02 RX ORDER — NORETHINDRONE ACETATE AND ETHINYL ESTRADIOL 1; 5 MG/1; UG/1
1 TABLET ORAL DAILY
Status: DISCONTINUED | OUTPATIENT
Start: 2017-05-02 | End: 2017-05-09 | Stop reason: HOSPADM

## 2017-05-02 RX ORDER — POTASSIUM CHLORIDE 1.5 G/1.58G
20-40 POWDER, FOR SOLUTION ORAL
Status: DISCONTINUED | OUTPATIENT
Start: 2017-05-02 | End: 2017-05-02 | Stop reason: HOSPADM

## 2017-05-02 RX ORDER — FOLIC ACID 1 MG/1
1 TABLET ORAL DAILY
Status: DISCONTINUED | OUTPATIENT
Start: 2017-05-03 | End: 2017-05-09 | Stop reason: HOSPADM

## 2017-05-02 RX ORDER — POTASSIUM CHLORIDE 29.8 MG/ML
20 INJECTION INTRAVENOUS
Status: DISCONTINUED | OUTPATIENT
Start: 2017-05-02 | End: 2017-05-02 | Stop reason: HOSPADM

## 2017-05-02 RX ORDER — SUMATRIPTAN 50 MG/1
100 TABLET, FILM COATED ORAL DAILY PRN
Status: DISCONTINUED | OUTPATIENT
Start: 2017-05-02 | End: 2017-05-09 | Stop reason: HOSPADM

## 2017-05-02 RX ORDER — FLUTICASONE PROPIONATE 50 MCG
2 SPRAY, SUSPENSION (ML) NASAL AT BEDTIME
Status: DISCONTINUED | OUTPATIENT
Start: 2017-05-02 | End: 2017-05-09 | Stop reason: HOSPADM

## 2017-05-02 RX ORDER — POTASSIUM CHLORIDE 7.45 MG/ML
10 INJECTION INTRAVENOUS
Status: DISCONTINUED | OUTPATIENT
Start: 2017-05-02 | End: 2017-05-02 | Stop reason: HOSPADM

## 2017-05-02 RX ORDER — MIRTAZAPINE 7.5 MG/1
7.5 TABLET, FILM COATED ORAL AT BEDTIME
Status: DISCONTINUED | OUTPATIENT
Start: 2017-05-02 | End: 2017-05-03

## 2017-05-02 RX ORDER — METOPROLOL TARTRATE 25 MG/1
25 TABLET, FILM COATED ORAL 2 TIMES DAILY
Status: DISCONTINUED | OUTPATIENT
Start: 2017-05-02 | End: 2017-05-09 | Stop reason: HOSPADM

## 2017-05-02 RX ADMIN — METOPROLOL TARTRATE 25 MG: 25 TABLET, FILM COATED ORAL at 09:37

## 2017-05-02 RX ADMIN — BACITRACIN ZINC: 500 OINTMENT TOPICAL at 11:33

## 2017-05-02 RX ADMIN — SODIUM CHLORIDE: 9 INJECTION, SOLUTION INTRAVENOUS at 03:22

## 2017-05-02 RX ADMIN — TOPIRAMATE 25 MG: 25 TABLET, FILM COATED ORAL at 09:37

## 2017-05-02 RX ADMIN — POTASSIUM CHLORIDE 40 MEQ: 1500 TABLET, EXTENDED RELEASE ORAL at 11:21

## 2017-05-02 RX ADMIN — LOSARTAN POTASSIUM 100 MG: 50 TABLET ORAL at 09:37

## 2017-05-02 RX ADMIN — TOPIRAMATE 25 MG: 25 TABLET, FILM COATED ORAL at 21:51

## 2017-05-02 RX ADMIN — METOPROLOL TARTRATE 25 MG: 25 TABLET, FILM COATED ORAL at 21:51

## 2017-05-02 RX ADMIN — FOLIC ACID 1 MG: 1 TABLET ORAL at 09:37

## 2017-05-02 RX ADMIN — Medication 100 MG: at 09:36

## 2017-05-02 RX ADMIN — MIRTAZAPINE 7.5 MG: 7.5 TABLET, FILM COATED ORAL at 21:51

## 2017-05-02 RX ADMIN — FLUTICASONE PROPIONATE 2 SPRAY: 50 SPRAY, METERED NASAL at 21:51

## 2017-05-02 RX ADMIN — MONTELUKAST SODIUM 10 MG: 10 TABLET, FILM COATED ORAL at 21:51

## 2017-05-02 RX ADMIN — THERA TABS 1 TABLET: TAB at 21:51

## 2017-05-02 RX ADMIN — MULTIPLE VITAMINS W/ MINERALS TAB 1 TABLET: TAB at 09:37

## 2017-05-02 RX ADMIN — SODIUM CHLORIDE: 9 INJECTION, SOLUTION INTRAVENOUS at 13:48

## 2017-05-02 RX ADMIN — BACITRACIN ZINC: 500 OINTMENT TOPICAL at 09:36

## 2017-05-02 RX ADMIN — POTASSIUM CHLORIDE 20 MEQ: 1500 TABLET, EXTENDED RELEASE ORAL at 14:16

## 2017-05-02 NOTE — PROGRESS NOTES
Clinic Care Coordination Contact  Care Team Conversations    Clinic Care Coordination Contact  Care Team Conversations        Clinical Data: call with hospital   Clinic Care Coordinator, LONDON explained difficulty with patient & her memory related to both her BI & use of alcohol  Patient in hospital under obs stay  Requesting psychic consult & CD consult. Patient will not get CD consult due to issues with Medicare. Also, patient will most likely not agree to CD assessment  Patient does have financial resources such that she could pay for TCU  Clinic Care Coordinator, LONDON encouraged hospital  to contact POA        Plan:      Clinic Care CoordinatorLONDON to follow up as indicated post hospital stay  DIMAS Gonsales, [facility]  Clinic Care Coordinator, LONDON with  [facility]  [phone]

## 2017-05-02 NOTE — TELEPHONE ENCOUNTER
called [facility]- they will send to the ordering provider.    Mohini AminRN  [facility]  [phone]

## 2017-05-02 NOTE — IP AVS SNAPSHOT
[place] [address] [place] [address]    Phone:  [phone]                                       After Visit Summary   5/2/2017    Melissa Jean Penrose    MRN: [id number]           After Visit Summary Signature Page     I have received my discharge instructions, and my questions have been answered. I have discussed any challenges I see with this plan with the nurse or doctor.    ..........................................................................................................................................  Patient/Patient Representative Signature      ..........................................................................................................................................  Patient Representative Print Name and Relationship to Patient    ..................................................               ................................................  Date                                            Time    ..........................................................................................................................................  Reviewed by Signature/Title    ...................................................              ..............................................  Date                                                            Time

## 2017-05-02 NOTE — DISCHARGE SUMMARY
[facility]  Discharge Summary  Hospitalist    Date of Admission:  5/1/2017  Date of Discharge:  5/2/2017  Discharging Provider: Suzanne Horne MD    Discharge Diagnoses   Alcohol Intoxication  Alcohol Withdrawal  Fall with facial abrasions  TBI history    History of Present Illness   Melissa Jean Penrose is a 66 year old female who was admitted on 5/1/2017 after fall at home while intoxicated. PMHx alcohol abuse, hypertension, TBI with C2-3 fracture. Multiple prior visits to our ED and others for similar falls related to alcohol intake. Admitted for observation for withdrawal symptoms and CD, psychiatry evaluations.     Hospital Course   Melissa Jean Penrose was admitted on 5/1/2017.  The following problems were addressed during her hospitalization:    Fall with facial abrasions  Assessment: Abrasions on nose and forehead. Fall related to alcohol intoxication. Frequent falls of similar nature at home, escalating as of late.   Plan: No special dressing changes needed. Allow to heal uncovered.      Acute alcohol intoxication with withdrawal symptoms  Macrocytosis  Assessment: Recurrent problems with alcohol abuse for >16 years, worsening and patient wants to get help with sobriety.  correlates with alcohol abuse, nutritional deficiencies.   Plan: CIWA initiated. Can be continued in psychiatry if appropriate to symptoms. CD consultation not able to be completed due to patient's insurance status. Psychiatry consult obtained and patient stable for transfer to inpatient psychiatry unit. Thiamine and folate given     H/o TBI  Short term memory loss since concussion 2016  Chronic headaches  Migraine headache history  Assessment: Noted. Followed at TBI clinic at [facility]. May have worsening memory problems given both parents developed dementia. No symptoms migraine at this time.   Plan: continue nortriptyline, topamax. She was taking gabapentin prn at home which is more appropriately used as a  scheduled medication. This can be revisited as an outpatient. Discontinue tramadol and continue on acetaminophen only. Outpatient follow-up yearly at [facility].     Depression, Anxiety  Assessment: longstanding symptoms but never formally diagnosed.  Plan: started mirtazapine per psych recs     Hypokalemia  Assessment: Associated with cellular electrolyte shifts in setting of alcohol abuse  Plan: Replete per protocol     Hypertension  Assessment:  Plan: continue losartan 100 mg daily, metoprolol 25 mg BID     Asthma history  Assessment: No symptoms at present.  Plan: PTA Singulair      Vitamins were held given observation status    Suzanne Horne MD  ~~~~~~~~~~~~~~~~~~~~~~~~~~~~~~~~~~~~~~~~~~~~~~  Pending Results   These results will be followed up by house doc for psychiatry  Unresulted Labs Ordered in the Past 30 Days of this Admission     Date and Time Order Name Status Description    5/1/2017 2205 Blood culture Preliminary     5/1/2017 2205 Blood culture Preliminary           Code Status   Full Code       Primary Care Physician   Qamar Zavala    Physical Exam   Temp: 98.4  F (36.9  C) Temp src: Oral BP: 117/71 Pulse: 84 Heart Rate: 85 Resp: 18 SpO2: 96 % O2 Device: None (Room air)    Vitals:    05/02/17 1433   Weight: 102.7 kg (226 lb 6.6 oz)     Vital Signs with Ranges  Temp:  [97.7  F (36.5  C)-98.5  F (36.9  C)] 98.4  F (36.9  C)  Pulse:  [83-86] 84  Heart Rate:  [] 85  Resp:  [18] 18  BP: (117-141)/(71-88) 117/71  SpO2:  [94 %-97 %] 96 %  I/O last 3 completed shifts:  In: 1641 [P.O.:990; I.V.:651]  Out: 120 [Urine:120]    Constitutional: Alert, NAD, pleasant and interactive  HEENT: PERRL, abrasions on bridge of nose, between eyebrows and along left side of nose  Respiratory: Lungs CTAB  Cardiovascular: RRR  no LE edema  GI: obese, soft, non-tender, nondistended  Skin/Integument: warm, dry, no acute rashes, no diaphoresis  Musc: SANTOYO, normal limb strength x 4  Neuro: AOx3, follows commands, no  tremors  Psych: odd affect, good eye contact, no hallucinations     Discharge Disposition   Discharged to inpatient psychiatry  Condition at discharge: Stable    Consultations This Hospital Stay   PHYSICAL THERAPY ADULT IP CONSULT  PSYCHIATRY IP CONSULT  CHEMICAL DEPENDENCY IP CONSULT  CHEMICAL DEPENDENCY IP CONSULT    Time Spent on this Encounter   ISuzanne, personally saw the patient today and spent 40 minutes discharging this patient.    Discharge Orders     General info for SNF   Length of Stay Estimate: Short Term Care: Estimated # of Days <30  Condition at Discharge: Improving  Level of care:skilled   Rehabilitation Potential: Excellent  Admission H&P remains valid and up-to-date: Yes  Recent Chemotherapy: N/A  Use Nursing Home Standing Orders: N/A     Reason for your hospital stay   Alcohol abuse, alcohol withdrawal, mood disorder, fall with facial abrasions     Activity - Up ad claudia     Follow Up and recommended labs and tests   With outpatient primary care doctor after psychiatry discharge     Full Code     Seizure precautions     Advance Diet as Tolerated   Follow this diet upon discharge:      Regular Diet Adult       Discharge Medications   Current Discharge Medication List      START taking these medications    Details   bacitracin 500 UNIT/GM OINT Apply topically every hour as needed for other (topical dermatitis)    Associated Diagnoses: Abrasion, face w/o infection      folic acid (FOLVITE) 1 MG tablet Take 1 tablet (1 mg) by mouth daily  Qty: 30 tablet, Refills: 0    Associated Diagnoses: Alcoholism /alcohol abuse (H)      thiamine 100 MG tablet Take 1 tablet (100 mg) by mouth daily  Qty: 5 tablet, Refills: 0    Associated Diagnoses: Alcoholism /alcohol abuse (H)         CONTINUE these medications which have NOT CHANGED    Details   Montelukast Sodium (SINGULAIR PO) Take 10 mg by mouth At Bedtime      norethindrone-ethinyl estradiol (FEMHRT 1/5) 1-5 MG-MCG per tablet Take 1 tablet by mouth  daily      OMEPRAZOLE PO Take 20 mg by mouth daily as needed      acetaminophen (TYLENOL) 325 MG tablet Take 2 tablets (650 mg) by mouth every 6 hours as needed for mild pain  Qty: 100 tablet, Refills: 0    Associated Diagnoses: Episodic tension-type headache, not intractable      MELOXICAM PO Take 15 mg by mouth daily as needed (migraine)       METOPROLOL TARTRATE PO Take 25 mg by mouth 2 times daily      NORTRIPTYLINE HCL PO Take 25 mg by mouth 2 times daily as needed (migraine) Reported on 4/27/2017      MELATONIN PO Take 3 mg by mouth nightly as needed Reported on 4/27/2017      losartan (COZAAR) 100 MG tablet Take 1 tablet by mouth daily       SUMAtriptan (IMITREX) 100 MG tablet Take 1 tablet by mouth at onset of headache (Take every 2 hours as needed. Max 200mg in 24 hours.) Reported on 4/27/2017  Refills: 3      Ascorbic Acid (VITAMIN C PO) Take 500 mg by mouth At Bedtime       multivitamin, therapeutic (THERA-VIT) TABS Take 1 tablet by mouth At Bedtime       azelastine (ASTELIN) 0.1 % nasal spray Spray 2 sprays into both nostrils every morning       mometasone (NASONEX) 50 MCG/ACT nasal spray Spray 2 sprays into both nostrils At Bedtime       Topiramate (TOPAMAX PO) Take 25 mg by mouth 2 times daily          STOP taking these medications       TraMADol HCl (ULTRAM PO) Comments:   Reason for Stopping:         Cyclobenzaprine HCl (FLEXERIL PO) Comments:   Reason for Stopping:         Gabapentin (NEURONTIN PO) Comments:   Reason for Stopping:             Allergies   Allergies   Allergen Reactions     Nuts [Peanut-Derived] Nausea and Vomiting     Amoxicillin      Morphine

## 2017-05-02 NOTE — H&P
PRIMARY CARE PHYSICIAN:  Dr. Zavala      CHIEF COMPLAINT:  Fall.      HISTORY OF PRESENT ILLNESS:  Melissa Penrose is a 66-year-old female with a history significant for alcohol abuse, hypertension, traumatic brain injury with C2-C3 fracture who presented from home by EMS.  The patient called the [facility] after falling.  The patient said that she got up about 9:30 this morning was trying to get to the bathroom and fell around 10:00 on the way to the bathroom.  She with EMS was disoriented, not remember falling just waking up.  When the EMS helped her up, she became lightheaded, but did not lose consciousness.  According to the ER physician, she called EMS frequently for similar circumstances.      The patient does not really remember much about the fall.  She denies that she lost consciousness or hit her head, although she does admit that she did bruise her nose.  She denies any other pain such as hip pain or abdominal pain.  She says she drinks 2 drinks of alcohol nightly.  She may have had too much to drink last night.  The patient says she has never had alcohol withdrawal seizures or alcohol withdrawal tremens, but also told me that she never stopped drinking.  The patient has had an alcohol level of 0.31 documented in Epic from 12/29/2016.  When the patient had a preoperative H&P by her primary care physician on 02/07/2017, they did note that she had a history of alcohol abuse.  The patient is being admitted to observation for possible alcohol withdrawal.  She did get some Ativan in the emergency room.      In the emergency room, the patient also was noted to have a low platelet count of 149,000 and elevated MCV of 106.  AST of 194, ALT of 103.  Alcohol level was 0.03.  CT of her head was also done, which did not show anything acute.      PAST MEDICAL HISTORY:   1.  History of lactic acidosis 09/20/2016   2.  History of acute kidney injury 07/13/2016.   3.  History of migraine without aura and without  Status Migrainosus, not intractable.   4.  Short-term memory loss after concussion in 2016.   5.  History of traumatic brain injury with loss of consciousness followed at [facility], also saw [facility].   6.  History of hypertension.   7.  History of alcohol abuse.   8.  History of frequent falls.   9.  History of elevated LFTs.   10.  Pulmonary nodules.  Plan for followup CT in 2017.   11.  Possible dementia.   12.  History of a right clavicular fracture   13.  History of C2-C3 fracture.      PAST SURGICAL HISTORY:  Surgery for a C2-C3 fracture.      ALLERGIES:  AMOXICILLIN and MORPHINE.      MEDICATIONS ON ADMISSION:   1.  Acetaminophen 650 mg q.6 h. p.r.n. mild pain.   2.  Ascorbic acid 500 mg at bedtime.   3.  Astelin 0.1% nasal spray 2 sprays both nostrils every morning.   4.  Flexeril 10 mg at bedtime.   5.  Gabapentin 300 mg daily p.r.n.   6.  Losartan 100 mg daily.   7.  Melatonin 3 mg nightly p.r.n.    8.  Meloxicam 15 mg daily p.r.n. migraine.   9.  Metoprolol tartrate 25 mg p.o. b.i.d.   10.  Nasonex nasal spray 2 sprays both nostrils at bedtime.   11.  Singulair 10 mg p.o. each day at bedtime.   12.  Multivitamin 1 tablet at bedtime.   13.  Norethindrone/ethinyl estradiol 1/5 one tablet daily.   14.  Nortriptyline 25 mg b.i.d. p.r.n. migraine.   15.  Omeprazole 20 mg daily.   16.  Imitrex 100 mg by mouth onset of headache.   17.  Topamax 25 mg b.i.d.   18.  Tramadol 50 mg 3 times daily p.r.n. moderate to severe pain.      FAMILY HISTORY:  Mother had Alzheimer's at age 58,  at 71.  Father  at 82.      SOCIAL HISTORY:  The patient is  for the last 16 years.  She is retired.  She has step-kids by her , but they are not close.  She does not smoke.  She quit smoking 15 years ago.  She drinks 2 drinks a night.  She has never been through treatment, but denies any history of alcohol withdrawal.      REVIEW OF SYSTEMS:   CONSTITUTIONAL:  The patient lost 10  pounds over the last 1-1/2 months.  She admits to poor eating.  Denies fevers, chills and night sweats.  The rest of 10-point review of systems is negative except  as in history of present illness.      PHYSICAL EXAMINATION:   GENERAL:  Pleasant female who has obvious abrasion on her nose.   VITAL SIGNS:  Blood pressure 134/76, pulse of 105, respiratory rate 16, temperature 98.5.   HEENT:  Pupils are equal.  Extraocular movements are intact.  Pharynx without erythema.  Her nose has an abrasion on the bridge of the nose.   NECK:  Supple.   CHEST:  Clear to auscultation.   CARDIOVASCULAR:  Regular rate and rhythm, normal S1, S2, no edema noted on her legs.   ABDOMEN:  Positive bowel sounds, soft and nontender.   EXTREMITIES:  No edema.   SKIN:  She has an abrasion on her nose but otherwise no other abrasions were noted.      LABORATORY DATA:  Sodium 140, potassium 3.6, chloride 103, bicarbonate 19, BUN 12, creatinine 0.74, calcium 9.1.  Bilirubin 1.0, alkaline phosphatase 155, ALT of 103, AST of 194.  Troponin 0.027.  Glucose 118.  White count 10.5, hemoglobin 13.0, platelet count 149,000.  ETOH level is 0.03.  INR 1.07.  CT of her head 05/01/2017 shows chronic changes, no evidence for intracranial hemorrhage or any acute process.  X-ray of her cervical spine shows straightening of the cervical spine, postoperative changes of C2 with an intact cortical screw overlying the body of the dens, no acute fracture appreciated moderate degenerative changes of the lower cervical spine.      ASSESSMENT AND PLAN:  Melissa Penrose is a pleasant 66-year-old female who has a history of hypertension, traumatic brain injury when she sustained a fall with a C2-C3 fracture which required surgery.  She also has a history of a clavicular fracture that occurred at the same fall which was not repaired and is quite prominent on exam.  She initially has a history of migraines and admits to drinking 2 drinks a night.  She does, however, have  "laboratory evidence consistent with chronic alcohol consumption with an elevated MCV of 106, decreased platelets of 149,000, and LFTs with an elevated AST over ALT with an AST of 194 and ALT of 103.  Her alcohol level was 0.03.  She also was slightly decreased bicarbonate of 19.  She does have a history of hypertension and lactic acidosis, acute kidney injury.   1.  Fall.  Unclear etiology but is probably related to her alcohol.  She could have had an alcohol withdrawal seizure, although doubt this.  She also could have been orthostatic.  Would like to check orthostatics lying, sitting and standing.  Would also like to have her on some IV fluids overnight.  Would like to have physical therapy see her in addition.   2.  Alcohol abuse and probable chronic alcoholism, given her lab findings.  Would like to have Psych see her.  She will be also on Ativan CIWA protocol.   3.  Metabolic acidosis.  Would like to check a lactic acid given her bicarbonate of 19.  Will also repeat a BMP in the morning.   4.  Elevated transaminases.  Will recheck these in the morning, but I suspect that these are due to chronic alcohol consumption.   5.  History of hypertension.  Will continue her blood pressure medications with parameters.   6.  Weight loss.  The patient reports a 10 pound weight loss over the last the month and a half.  She admits to poor eating because she says she is \"lazy\".   7.  Slightly elevated troponin.  Her EKG was done which showed T-wave inversions in lead III and V6.  The patient does not have any chest pain and denies any history of heart disease.  Will repeat her troponin in the morning.  Will also have the patient on tele.   8.  History of migraines.  Given her observation status, will hold on ordering medications for migraines except for her Topamax.   9.  Deep venous thrombosis prophylaxis.  The patient will be on SCDs.      CODE STATUS:  Full code.      DISPOSITION:  The patient will be admitted under " observation status.         MELISSA AGUILAR MD             D: 2017 18:31   T: 2017 23:27   MT: [id number]      Name:     PENROSE, MELISSA   MRN:      [id number]        Account:      [id number]   :      1950           Admitted:     [id number]      Document: [id number]

## 2017-05-02 NOTE — PLAN OF CARE
Problem: Goal Outcome Summary  Goal: Goal Outcome Summary  Outcome: Improving  Pt A&Ox4, but forgetful. Tachy at times, otherwise VSS on RA. C/O headache- ibuprofen 1x dose given. CIWA 2. Orthostatic BPs normal. Tele NSR. Lactic acid 4.1- bolus given.

## 2017-05-02 NOTE — CONSULTS
"[facility] Initial Psychiatric Consult Note      TIME SPENT IN PSYCHIATRY INITIAL CONSULT: 55 MINUTES    Consult ordered by: Rimma Schrader MD.  Reason: ETOH use.     Initial History     The patient's care was discussed, patient seen and chart notes were reviewed.    Patient examined for psychiatric consultation.     IDENTIFICATION    Pt is a 66 year old   female currently living in [place]. Pt sees PCP Qamar Conner. She has never seen a psychiatrist. Pt seen on 66 for alcohol use.    HISTORY OF PRESENT ILLNESS    Patient was admitted to the hospital last night after arriving in the ED by EMS s/p fall, disorientation. She doesn't remember much about the fall, denies losing consciousness. Has a history of falling. Psych was consulted due to a history of heavy alcohol use and lab values consistent with alcohol abuse (, platelets of 149,000, , , ETOH level was 0.03).     Patient endorses 2 drinks of scotch nightly. She states about her drinking that \"I know I've got to get it under control\" but that she gets lonely at night. She lives alone, her   16 years ago. Denies ever having withdrawal symptoms, hallucinations, blackouts, tremors or seizures, but also states that she has never gone an extended period without drinking. She denies other drug use. Has never had legal trouble with drinking and has never sought treatment.    Patient denies feeling sad, hopeless, worthless, guilty, experiencing decreased motivation or interest, concentration or memory difficulties. She endorses decreased appetite and a weight loss of 10 pounds. She states she is not depressed but \"I haven't been happy since my  .\"    Patient denies symptoms of mayra, feeling anxious or worried. She states that she has no anxiety because \"I am bored out of my mind\" since retiring last year. She occasionally has trouble falling asleep but no trouble staying asleep.     She " denies obsessions, symptoms of paranoia, delusions, or hallucinations. She has never been the victim of abuse. Denies poor attention, organization, as well as nightmares, flashbacks, avoidant behavior.    She has never been diagnosed with a mental illness and has never seen a psychiatrist or a therapist. She grew up in [place] and had a supportive relationship with her parents.    Patient denies suicidal ideation or intent.     CHEMICAL DEPENDENCY HISTORY    See HPI--no other drug use. Utox revealed BAL of 0.03 on admission.    PAST PSYCHIATRIC HISTORY    None reported. She has never taken psychotropic medications.     FAMILY HISTORY    Denies family history of mental health concerns. Mother had Alzheimer's at 58,  at 71. Father was diagnosed with Alzheimer's at 82.     SOCIAL HISTORY    The patient is  for the last 16 years, her  passed away due to complications from brain cancer.  She is retired from [employer]. Undergrad at [employer], [place] from [employer]. She worked at the [employer] for 20 years until the company was shut down. She has step-kids by her , but they are not that close. Does talk to step-daughter, Rimma once a week, who is a [employer]  in [place], [place]. She does not smoke.  She quit smoking 15 years ago.  She lives alone in her own home.      Medications     Prescriptions Prior to Admission   Medication Sig Dispense Refill Last Dose     Montelukast Sodium (SINGULAIR PO) Take 10 mg by mouth At Bedtime   2017 at hs     norethindrone-ethinyl estradiol (FEMHRT ) 1-5 MG-MCG per tablet Take 1 tablet by mouth daily   2017 at am     OMEPRAZOLE PO Take 20 mg by mouth daily as needed   prn     TraMADol HCl (ULTRAM PO) Take 50 mg by mouth 3 times daily as needed for moderate to severe pain   prn     Cyclobenzaprine HCl (FLEXERIL PO) Take 10 mg by mouth At Bedtime   2017 at hs     acetaminophen (TYLENOL) 325 MG tablet Take 2  tablets (650 mg) by mouth every 6 hours as needed for mild pain 100 tablet 0 prn     MELOXICAM PO Take 15 mg by mouth daily as needed (migraine)    prn     METOPROLOL TARTRATE PO Take 25 mg by mouth 2 times daily   4/30/2017 at pm     NORTRIPTYLINE HCL PO Take 25 mg by mouth 2 times daily as needed (migraine) Reported on 4/27/2017   prn     MELATONIN PO Take 3 mg by mouth nightly as needed Reported on 4/27/2017   prn     losartan (COZAAR) 100 MG tablet Take 1 tablet by mouth daily    5/1/2017 at am     SUMAtriptan (IMITREX) 100 MG tablet Take 1 tablet by mouth at onset of headache (Take every 2 hours as needed. Max 200mg in 24 hours.) Reported on 4/27/2017  3 prn     Ascorbic Acid (VITAMIN C PO) Take 500 mg by mouth At Bedtime    4/30/2017 at hs     multivitamin, therapeutic (THERA-VIT) TABS Take 1 tablet by mouth At Bedtime    4/30/2017 at hs     azelastine (ASTELIN) 0.1 % nasal spray Spray 2 sprays into both nostrils every morning    4/30/2017 at am     mometasone (NASONEX) 50 MCG/ACT nasal spray Spray 2 sprays into both nostrils At Bedtime    4/30/2017 at hs     Gabapentin (NEURONTIN PO) Take 300 mg by mouth daily as needed    prn     Topiramate (TOPAMAX PO) Take 25 mg by mouth 2 times daily    4/30/2017 at pm       Scheduled Medications    sodium chloride (PF)  3 mL Intracatheter Q8H     losartan  100 mg Oral Daily     metoprolol (LOPRESSOR) tablet 25 mg  25 mg Oral BID     montelukast (SINGULAIR) tablet 10 mg  10 mg Oral At Bedtime     topiramate (TOPAMAX) tablet 25 mg  25 mg Oral BID     thiamine  100 mg Oral Daily     folic acid  1 mg Oral Daily     multivitamin, therapeutic with minerals  1 tablet Oral Daily     PRNs:  bacitracin, sodium chloride (PF), naloxone, LORazepam **OR** LORazepam      Allergies      Allergies   Allergen Reactions     Nuts [Peanut-Derived] Nausea and Vomiting     Amoxicillin      Morphine         Previous Medical History     Past Medical History:   Diagnosis Date     Allergic state   "    Dementia      Hypertension      Substance abuse      TBI (traumatic brain injury) (H)         Medical Review of Systems   /88 (BP Location: Right arm)  Pulse 83  Temp 98.5  F (36.9  C) (Oral)  Resp 18  Ht 1.803 m (5' 11\")  SpO2 97%  There is no height or weight on file to calculate BMI.  Previous 10-point ROS completed by Rimma Schrader MD on 5/1/17 reviewed by Curtis Balderrama MD on May 2, 2017 and is unchanged except for those problems mentioned within the HPI.      Mental Status Examination     Appearance Lying in bed, dressed in gown. Appears stated age.   Attitude Cooperative, guarded   Orientation Oriented to person, place, time   Eye Contact Poor   Speech Regular rate, rhythm, volume and tone   Language Normal   Psychomotor Behavior Normal   Thought Process Goal-Oriented, Intact   Associations Intact   Thought Content Patient is currently negative for suicide ideation, negative for plan or intent, able to contract no self harm and identify barriers to suicide.  Negative for obsessions, compulsions or psychosis.     Mood Lowered, depressed, withdrawn   Affect Flat   Fund of Knowledge WNL   Insight Decreased   Judgement Decreased   Attention Span & Concentration Normal   Recent & Remote Memory Intact   Gait Did not assess      Labs   Labs reviewed.  Recent Results (from the past 24 hour(s))   EKG 12-lead, tracing only    Collection Time: 05/01/17 12:40 PM   Result Value Ref Range    Interpretation ECG Click View Image link to view waveform and result    Alcohol ethyl    Collection Time: 05/01/17  1:38 PM   Result Value Ref Range    Ethanol g/dL 0.03 (H) <0.01 g/dL   CBC with platelets differential    Collection Time: 05/01/17  1:38 PM   Result Value Ref Range    WBC 10.5 4.0 - 11.0 10e9/L    RBC Count 3.59 (L) 3.8 - 5.2 10e12/L    Hemoglobin 13.0 11.7 - 15.7 g/dL    Hematocrit 38.2 35.0 - 47.0 %     (H) 78 - 100 fl    MCH 36.2 (H) 26.5 - 33.0 pg    MCHC 34.0 31.5 - 36.5 g/dL    RDW " 13.4 10.0 - 15.0 %    Platelet Count 149 (L) 150 - 450 10e9/L    Diff Method Automated Method     % Neutrophils 82.1 %    % Lymphocytes 8.5 %    % Monocytes 8.6 %    % Eosinophils 0.0 %    % Basophils 0.3 %    % Immature Granulocytes 0.5 %    Nucleated RBCs 0 0 /100    Absolute Neutrophil 8.6 (H) 1.6 - 8.3 10e9/L    Absolute Lymphocytes 0.9 0.8 - 5.3 10e9/L    Absolute Monocytes 0.9 0.0 - 1.3 10e9/L    Absolute Eosinophils 0.0 0.0 - 0.7 10e9/L    Absolute Basophils 0.0 0.0 - 0.2 10e9/L    Abs Immature Granulocytes 0.1 0 - 0.4 10e9/L    Absolute Nucleated RBC 0.0    Comprehensive metabolic panel    Collection Time: 05/01/17  1:38 PM   Result Value Ref Range    Sodium 140 133 - 144 mmol/L    Potassium 3.6 3.4 - 5.3 mmol/L    Chloride 103 94 - 109 mmol/L    Carbon Dioxide 19 (L) 20 - 32 mmol/L    Anion Gap 18 (H) 3 - 14 mmol/L    Glucose 118 (H) 70 - 99 mg/dL    Urea Nitrogen 12 7 - 30 mg/dL    Creatinine 0.74 0.52 - 1.04 mg/dL    GFR Estimate 79 >60 mL/min/1.7m2    GFR Estimate If Black >90   GFR Calc   >60 mL/min/1.7m2    Calcium 9.1 8.5 - 10.1 mg/dL    Bilirubin Total 1.0 0.2 - 1.3 mg/dL    Albumin 3.6 3.4 - 5.0 g/dL    Protein Total 7.5 6.8 - 8.8 g/dL    Alkaline Phosphatase 155 (H) 40 - 150 U/L     (H) 0 - 50 U/L     (H) 0 - 45 U/L   INR    Collection Time: 05/01/17  1:38 PM   Result Value Ref Range    INR 1.07 0.86 - 1.14   Troponin I    Collection Time: 05/01/17  1:38 PM   Result Value Ref Range    Troponin I ES 0.027 0.000 - 0.045 ug/L   UA with Microscopic    Collection Time: 05/01/17  8:50 PM   Result Value Ref Range    Color Urine Yellow     Appearance Urine Clear     Glucose Urine 300 (A) NEG mg/dL    Bilirubin Urine Negative NEG    Ketones Urine 40 (A) NEG mg/dL    Specific Gravity Urine 1.016 1.003 - 1.035    Blood Urine Trace (A) NEG    pH Urine 6.0 5.0 - 7.0 pH    Protein Albumin Urine 10 (A) NEG mg/dL    Urobilinogen mg/dL 2.0 0.0 - 2.0 mg/dL    Nitrite Urine Negative NEG     Leukocyte Esterase Urine Negative NEG    Source Midstream Urine     WBC Urine 3 (H) 0 - 2 /HPF    RBC Urine 1 0 - 2 /HPF    Squamous Epithelial /HPF Urine <1 0 - 1 /HPF    Mucous Urine Present (A) NEG /LPF    Hyaline Casts 3 (H) 0 - 2 /LPF   Lactic acid whole blood    Collection Time: 05/01/17  9:24 PM   Result Value Ref Range    Lactic Acid 4.1 (HH) 0.7 - 2.1 mmol/L   Blood culture    Collection Time: 05/01/17 10:55 PM   Result Value Ref Range    Specimen Description Blood Right Hand     Special Requests Aerobic and anaerobic bottles received     Culture Micro No growth after 3 hours     Micro Report Status Pending    Blood culture    Collection Time: 05/01/17 11:05 PM   Result Value Ref Range    Specimen Description Blood Left Hand     Special Requests Aerobic and anaerobic bottles received     Culture Micro No growth after 3 hours     Micro Report Status Pending         Impression   This is a 66 year old female with Major depression, recurrent, severe, without psychotic features and alcohol use disorder, severe. Dr. Balderrama introduced pt to the ideas of barriers to use. Discussed chemical, physical and financial barriers to chemical use, especially one at risk of Wernicke's encephalopathy. Pt verbalized understanding. Dr. Balderrama encouraged pt to practice Freeze Frame Exercise -- to think of one specific extremely positive memory multiple times a day to preemptively keep pt from becoming more anxious and depressed. Discussed starting pt on Remeron.  Reviewed the side effects, benefits, and complications of medication. Pt gave verbal agreement to begin Remeron 7.5mg qhs. As she has Medicare-type insurance, she will not be able to receive  services through [facility]. Discussed moving pt to Psychiatry on 77 for further stabilization. Encouraged pt to attend IOP at [facility] to build a support network and develop methods to cope with pt's anxiety and depression. Pt acknowledged. .   Diagnoses      1. Major depression, recurrent, severe, without psychotic features.   2. Alcohol use disorder, severe.     Plan     1. Explained side effects, benefits and complications of medications to the patient.  2. Medication Changes: Initiate Remeron 7.5mg qhs.  3. Discussed treatment plan with patient and team.  4. Pt to move to Psychiatry once she is medically stable.      TIME SPENT IN PSYCHIATRY INITIAL CONSULT: 55 MINUTES     Attestation:   Patient has been seen and evaluated by me, Curtis Balderrama MD.    Patient ID:  Name: Melissa Jean Penrose  MRN: [id number]  Admission: 5/1/2017   YOB: 1950

## 2017-05-02 NOTE — PROGRESS NOTES
Clinic Care Coordination Contact  Care Team Conversations    Clinic Care Coordination Contact  Care Team Conversations        Clinical Data: info from POA  1) she saw patient at hospital last night & told patient that she can not continue with falls & being alone in her house; patient agreed to go to nursing home following her hospital stay  2) hospital  will be contacting POA after therapy sees patient to indicated if patient needs TCU or can be d/c with home care  3) POA has contacted [facility] to look for NALLELY in [place] for patient to move into directly with only going home to get clothes; patient was agreeing to that  4) plan is for this to be for trail but will hopefully lead to permanent move into nursing home        Plan:      Clinic Care Coordinator, [place] to continue to be available to assist patient & POA as needed  DIMAS Gonsales, [facility]  Clinic Care Coordinator, [place] with [facility]  [phone]

## 2017-05-02 NOTE — PROGRESS NOTES
Internal Medicine  Patient seen & examined, consult notes reviewed. Stable for transfer to inpatient psychiatry where she can continue potassium replacement if needed. No longer getting diazepam for withdrawal symptoms.     Suzanne Horne MD

## 2017-05-02 NOTE — CONSULTS
5/2/17 cd consult acknowledged.  Per EMR, patient has Medicare and would need to seek chem dep services from [facility] program as they are only Medicare approved facility for chem dep treatment services. Social work can assist with resource and referral.

## 2017-05-02 NOTE — PROGRESS NOTES
[facility]  Hospitalist Progress Note    Assessment & Plan   Melissa Jean Penrose is a 66 year old female who was admitted on 5/1/2017 after fall at home while intoxicated. PMHx alcohol abuse, hypertension, TBI with C2-3 fracture. Multiple prior visits to our ED and others for similar falls related to alcohol intake. Admitted for observation for withdrawal symptoms and CD, psychiatry evaluations.     Summary:   Fall with facial abrasions  Assessment: Abrasions on nose and forehead. Fall related to alcohol intoxication. Frequent falls of similar nature at home, escalating as of late.   Plan: No special dressing changes needed. Allow to heal uncovered.     Acute alcohol intoxication with withdrawal symptoms  Macrocytosis  Assessment: Recurrent problems with alcohol abuse for >16 years, worsening and patient wants to get help with sobriety.  correlates with alcohol abuse, nutritional deficiencies.   Plan:  Continue CIWA   CD consultation noted   Psychiatry consult appreciated. Appropriate for inpatient psych treatment.    Thiamine and folate given    H/o TBI  Short term memory loss since concussion 2016  Chronic headaches  Migraine headache history  Assessment: Noted. Followed at TBI clinic at [facility]. May have worsening memory problems given both parents developed dementia. No symptoms migraine at this time.   Plan: continue nortriptyline, topamax, gabapentin  prn acetaminophen, tramadol  Outpatient follow-up yearly at [facility].    Depression, Anxiety  Assessment: longstanding symptoms but never formally diagnosed.  Plan: start mirtazapine per psych recs    Hypokalemia  Assessment: Associated with cellular electrolyte shifts in setting of alcohol abuse  Plan: Replete per protocol    Hypertension  Assessment:  Plan: continue losartan 100 mg daily, metoprolol 25 mg BID    Asthma history  Assessment: No symptoms at present.  Plan: PTA Singulair     Hold vitamins given observation status    Diet: regular    GI prophylaxis: on chronic PPI  DVT prophylaxis: Pneumatic Compression Devices, ambulate  Code Status: Full Code    Disposition: Expected discharge in to psychiatry when bed is available.    Suzanne Horne MD  Text Page  ~~~~~~~~~~~~~~~~~~~~~~~~~~~~~~~~~~~~~~~~~~~~~~~  Interval History   No tremors or diaphoresis. Would like to talk about CD treatment options. Wants to be sober.     No headache today but was wondering what was on her nose (an abrasion and scab from fall which brought her to the hospital).     -Data reviewed today: I reviewed all new labs and imaging results over the last 24 hours. I personally reviewed the EKG tracing showing sinus rhythm, occasional tachycardia.    Physical Exam   Temp: 97.7  F (36.5  C) Temp src: Oral BP: 137/82 Pulse: 84 Heart Rate: 83 Resp: 18 SpO2: 94 % O2 Device: None (Room air)    There were no vitals filed for this visit.  Vital Signs with Ranges  Temp:  [97.7  F (36.5  C)-98.5  F (36.9  C)] 97.7  F (36.5  C)  Pulse:  [83-86] 84  Heart Rate:  [] 83  Resp:  [15-29] 18  BP: (121-168)/() 137/82  SpO2:  [94 %-98 %] 94 %  I/O last 3 completed shifts:  In: -   Out: 120 [Urine:120]    Constitutional: Alert, NAD, pleasant and interactive  HEENT: PERRL, abrasions on bridge of nose, between eyebrows and along left side of nose  Respiratory: Lungs CTAB  Cardiovascular: RRR  no LE edema  GI: obese, soft, non-tender, nondistended  Skin/Integument: warm, dry, no acute rashes, no diaphoresis  Musc: SANTOYO, normal limb strength x 4  Neuro: AOx3, follows commands, no tremors  Psych: odd affect, good eye contact, no hallucinations      Medications     NaCl 100 mL/hr at 05/02/17 1348       mirtazapine  7.5 mg Oral At Bedtime     sodium chloride (PF)  3 mL Intracatheter Q8H     losartan  100 mg Oral Daily     metoprolol (LOPRESSOR) tablet 25 mg  25 mg Oral BID     montelukast (SINGULAIR) tablet 10 mg  10 mg Oral At Bedtime     topiramate (TOPAMAX) tablet 25 mg  25 mg Oral BID      thiamine  100 mg Oral Daily     folic acid  1 mg Oral Daily     multivitamin, therapeutic with minerals  1 tablet Oral Daily       Data     Recent Labs  Lab 05/02/17  0825 05/01/17  1338 04/27/17  1115   WBC  --  10.5  --    HGB  --  13.0  --    MCV  --  106*  --    PLT  --  149*  --    INR  --  1.07  --     140 139   POTASSIUM 3.1* 3.6 3.2*   CHLORIDE 105 103 100   CO2 24 19* 25   BUN 9 12 7   CR 0.63 0.74 0.80   ANIONGAP 10 18* 14   PRAVIN 8.4* 9.1 8.8   * 118* 92   ALBUMIN 3.0* 3.6 4.0   PROTTOTAL 6.4* 7.5 8.1   BILITOTAL 1.3 1.0 0.6   ALKPHOS 136 155* 143   ALT 81* 103* 104*   * 194* 152*   TROPI 0.045 0.027  --        Imaging:  Recent Results (from the past 24 hour(s))   XR Chest 2 Views    Narrative    XR CHEST 2 VW  5/2/2017 12:15 AM     INDICATION: Evaluate for infiltrate. Fall.    COMPARISON: 9/6/2016.      Impression    IMPRESSION: No infiltrates or other acute findings. Heart size is  within normal limits. Mild torsion aorta. Asymmetry of the medial  clavicles as previously noted which could be due to old trauma.  Postoperative changes right shoulder.    CHANDAN MATHEWS MD     TT 40 min, cc >50% discussed findings and plans with patient, RN, care coordinator. Chart reviewed and orders entered.

## 2017-05-02 NOTE — PROGRESS NOTES
Goals to be met before discharge:   -diagnostic tests and consults completed and resulted: partially met--K replacement in progress.  -vital signs normal or at patient baseline: met   -Psych and CD to see, also orthostatics and PT: partially met; PT still to see.

## 2017-05-02 NOTE — PROGRESS NOTES
Care Transition Initial Assessment - SW  Reason For Consult: discharge planning, psychosocial concerns, substance use concerns  Met with: Patient  Active Problems:    Fall       DATA  Lives With: alone     Who is your support system?: Other (specify) (FriendReena) Has a step daughter in [place], in regular contact by phone.    Identified issues/concerns regarding health management: Patient is a 66 year old female admitted with OBS status after a fall.   Received a call from patient's clinic SW/CC Uma who reports friendReena has POA. Patient had been opened to [facility] on 4/27. SW/CC noted patient's alcohol use and other past difficulties at home. The POA is now contacting [facility] to look for an N[facility].  PT noted no skilled needs, but that patient may benefit from outpatient PT for balance.  CD can not see patient in this hospital as her payment source is Medicare.  Psychiatry consult indicates patient has major depression. The plan is to move patient to the mental health unit once medically stable and a bed is available.  Met with patient and offered CD resource booklet with [facility]. Explained this is the Medicare provider in the area. Patient states she knows she needs to stop drinking and she is now motivated to do so. Encouraged her to seek help and support for this.     Quality Of Family Relationships: Limited      ASSESSMENT  Cognitive Status: A&O, but forgetful, per RN notes  Concerns to be addressed: CD Resources, D/C planning     PLAN  Financial costs for the patient includes: N/A  Patient given options and choices for discharge: Yes  Patient/family is agreeable to the plan? Yes  Patient Goals and Preferences: MH Unit, then home versus NALLELY.  Patient anticipates discharging to: MH unit then home versus correction.    SW will continue to be available as needed.

## 2017-05-02 NOTE — PLAN OF CARE
Problem: Goal Outcome Summary  Goal: Goal Outcome Summary  Outcome: Adequate for Discharge Date Met:  05/02/17  Pt is a/o x 4. VSS. Anxious about transfer. CIWA 4 for agitation. [facility] transferred to [facility] for further observation. Discharge instructions given, all questions answered. All belongings sent with pt.

## 2017-05-02 NOTE — IP AVS SNAPSHOT
MRN:[id number]                      After Visit Summary   5/2/2017    Melissa Jean Penrose    MRN: [id number]           Thank you!     Thank you for choosing [facility] for your care. Our goal is always to provide you with excellent care.        Patient Information     Date Of Birth          1950        Designated Caregiver       Most Recent Value    Caregiver    Will someone help with your care after discharge? no      About your hospital stay     You were admitted on:  May 2, 2017 You last received care in the:  [facility]    You were discharged on:  May 9, 2017       Who to Call     For medical emergencies, please call 911.  For non-urgent questions about your medical care, please call your primary care provider or clinic, [phone]          Attending Provider     Provider Curtis Munguia MD Psychiatry       Primary Care Provider Office Phone # Fax #    Qamar Zavala [phone]       [facility] [place] [address] [place] [address] [address]  [place] [address]        Further instructions from your care team       Behavioral Discharge Planning and Instructions    Summary:  Admitted to hospital after fall at home while intoxicated    Main Diagnosis:  Major Depression, recurrent, severe, without psychotic features; Alcohol Use Disorder, severe.    Major Treatments, Procedures and Findings: Psychiatric assessment.     Symptoms to Report: Increased confusion; Losing more sleep, Mood getting worse or Thoughts of suicide    Lifestyle Adjustment: Follow all treatment recommendations. Develop and follow safety plan. Abstain from all use of alcohol and maintain sobriety by going to chemical dependency treatment or attending AA meetings and obtaining a sponsor.     Psychiatry Follow-up:      Admission to:  [address]  [place], [place] [address] [phone] fax: [phone]    Dr. Balderrama  - patient can call  for appointment if she wishes to follow with Dr. Balderrama.      [facility]  [address] [facility], [address]  [place]  [address]  Phone:  [phone]  Fax:  [phone]    Primary care: Dr. Qamar Zavala  [facility]  [address] [place] [address] [place]   [phone] fax: [phone]    Resources:   Crisis Intervention: 697.795.6442 or 923-351-4825 (TTY: 824.591.9500).  Call anytime for help.  National [place] on Mental Illness (www.[place].arielle.org): 721.345.9511 or 673-386-5419.  Alcoholics Anonymous (www.alcoholics-anonymous.org): Check your phone book for your local chapter.  National Suicide Prevention Line (www.mentalhealthmn.org): 706-227-CXPA (0894)  Mental Health Association of [place] (www.mentalhealth.org): 453.458.6805 or 276-868-4859  COPE (Crisis Services for Adults) in [facility]: 418.559.4485 (Available 24/7)    General Medication Instructions:   See your medication sheet(s) for instructions.   Take all medicines as directed.  Make no changes unless your doctor suggests them.   Go to all your doctor visits.  Be sure to have all your required lab tests. This way, your medicines can be refilled on time.  Do not use any drugs not prescribed by your doctor.  Avoid alcohol.      Pending Results     No orders found from 4/30/2017 to 5/3/2017.            Statement of Approval     Ordered          05/09/17 0909  I have reviewed and agree with all the recommendations and orders detailed in this document.  EFFECTIVE NOW     Approved and electronically signed by:  Curtis Balderrama MD           05/08/17 3445  I have reviewed and agree with all the recommendations and orders detailed in this document.  EFFECTIVE NOW     Approved and electronically signed by:  Curtis Balderrama MD             Admission Information     Date & Time Provider Department Dept. Phone    5/2/2017 Curtis Balderrama MD [facility] [phone]      Your Vitals Were      Blood Pressure Pulse Temperature Respirations          111/69 90 97.9  F (36.6  C) (Oral) 16        [facility] Information     [facility] gives you secure access to your electronic health record. If you see a primary care provider, you can also send messages to your care team and make appointments. If you have questions, please call your primary care clinic.  If you do not have a primary care provider, please call [phone] and they will assist you.        Care EveryWhere ID     This is your Care EveryWhere ID. This could be used by other organizations to access your [facility] medical records  [id number]           Review of your medicines      CONTINUE these medicines which may have CHANGED, or have new prescriptions. If we are uncertain of the size of tablets/capsules you have at home, strength may be listed as something that might have changed.        Dose / Directions    mirtazapine 15 MG tablet   Commonly known as:  REMERON   This may have changed:  how much to take   Used for:  Severe recurrent major depression without psychotic features (H)        Dose:  15 mg   Take 1 tablet (15 mg) by mouth At Bedtime   Quantity:  30 tablet   Refills:  0       montelukast 10 MG tablet   Commonly known as:  SINGULAIR   This may have changed:  medication strength   Used for:  Allergic rhinitis due to pollen, unspecified rhinitis seasonality        Dose:  10 mg   Take 1 tablet (10 mg) by mouth At Bedtime   Quantity:  30 tablet   Refills:  0       omeprazole 20 MG tablet   This may have changed:    - medication strength  - when to take this  - reasons to take this   Used for:  Gastroesophageal reflux disease without esophagitis        Dose:  20 mg   Take 1 tablet (20 mg) by mouth daily   Quantity:  30 tablet   Refills:  0       topiramate 25 MG tablet   Commonly known as:  TOPAMAX   Indication:  Migraine Headache   This may have changed:  medication strength   Used for:  Migraine without aura and without status migrainosus, not  intractable        Dose:  25 mg   Take 1 tablet (25 mg) by mouth 2 times daily   Quantity:  60 tablet   Refills:  0       vitamin C 500 MG Chew   This may have changed:  medication strength   Used for:  Alcoholism /alcohol abuse (H)        Dose:  500 mg   Take 500 mg by mouth At Bedtime   Quantity:  30 tablet   Refills:  0         CONTINUE these medicines which have NOT CHANGED        Dose / Directions    acetaminophen 325 MG tablet   Commonly known as:  TYLENOL   Used for:  Episodic tension-type headache, not intractable        Dose:  650 mg   Take 2 tablets (650 mg) by mouth every 6 hours as needed for mild pain   Quantity:  100 tablet   Refills:  0       azelastine 0.1 % spray   Commonly known as:  ASTELIN   Used for:  Allergic rhinitis due to pollen, unspecified rhinitis seasonality        Dose:  2 spray   Spray 2 sprays into both nostrils every morning   Quantity:  1 Bottle   Refills:  0       folic acid 1 MG tablet   Commonly known as:  FOLVITE   Used for:  Alcoholism /alcohol abuse (H)        Dose:  1 mg   Take 1 tablet (1 mg) by mouth daily   Quantity:  30 tablet   Refills:  0       losartan 100 MG tablet   Commonly known as:  COZAAR   Used for:  Hypertension goal BP (blood pressure) < 140/80        Dose:  100 mg   Take 1 tablet (100 mg) by mouth daily   Quantity:  30 tablet   Refills:  0       metoprolol 25 MG tablet   Commonly known as:  LOPRESSOR   Used for:  Hypertension goal BP (blood pressure) < 140/80        Dose:  25 mg   Take 1 tablet (25 mg) by mouth 2 times daily   Quantity:  60 tablet   Refills:  0       mometasone 50 MCG/ACT spray   Commonly known as:  NASONEX   Used for:  Allergic rhinitis due to pollen, unspecified rhinitis seasonality        Dose:  2 spray   Spray 2 sprays into both nostrils At Bedtime   Quantity:  17 g   Refills:  0       multivitamin, therapeutic Tabs tablet   Used for:  Alcoholism /alcohol abuse (H)        Dose:  1 tablet   Take 1 tablet by mouth At Bedtime   Quantity:  30  tablet   Refills:  0       norethindrone-ethinyl estradiol 1-5 MG-MCG per tablet   Commonly known as:  FEMHRT 1/5        Dose:  1 tablet   Take 1 tablet by mouth daily   Refills:  0       SUMAtriptan 100 MG tablet   Commonly known as:  IMITREX   Used for:  Migraine without aura and without status migrainosus, not intractable        Dose:  100 mg   Take 1 tablet (100 mg) by mouth at onset of headache (Take every 2 hours as needed. Max 200mg in 24 hours.) Reported on 4/27/2017   Quantity:  30 tablet   Refills:  0       thiamine 100 MG tablet   Used for:  Alcoholism /alcohol abuse (H)        Dose:  100 mg   Take 1 tablet (100 mg) by mouth daily   Quantity:  30 tablet   Refills:  0         STOP taking     bacitracin 500 UNIT/GM Oint           MELOXICAM PO                Where to get your medicines      Some of these will need a paper prescription and others can be bought over the counter. Ask your nurse if you have questions.     Bring a paper prescription for each of these medications     acetaminophen 325 MG tablet    azelastine 0.1 % spray    folic acid 1 MG tablet    losartan 100 MG tablet    metoprolol 25 MG tablet    mirtazapine 15 MG tablet    mometasone 50 MCG/ACT spray    montelukast 10 MG tablet    multivitamin, therapeutic Tabs tablet    omeprazole 20 MG tablet    SUMAtriptan 100 MG tablet    thiamine 100 MG tablet    topiramate 25 MG tablet    vitamin C 500 MG Chew                Protect others around you: Learn how to safely use, store and throw away your medicines at www.disposemymeds.org.             Medication List: This is a list of all your medications and when to take them. Check marks below indicate your daily home schedule. Keep this list as a reference.      Medications           Morning Afternoon Evening Bedtime As Needed    acetaminophen 325 MG tablet   Commonly known as:  TYLENOL   Take 2 tablets (650 mg) by mouth every 6 hours as needed for mild pain                                   azelastine  0.1 % spray   Commonly known as:  ASTELIN   Spray 2 sprays into both nostrils every morning   Last time this was given:  2 sprays on 5/9/2017  8:46 AM                                   folic acid 1 MG tablet   Commonly known as:  FOLVITE   Take 1 tablet (1 mg) by mouth daily   Last time this was given:  1 mg on 5/9/2017  8:45 AM                                   losartan 100 MG tablet   Commonly known as:  COZAAR   Take 1 tablet (100 mg) by mouth daily   Last time this was given:  100 mg on 5/9/2017  8:44 AM                                   metoprolol 25 MG tablet   Commonly known as:  LOPRESSOR   Take 1 tablet (25 mg) by mouth 2 times daily   Last time this was given:  25 mg on 5/9/2017  8:44 AM                                      mirtazapine 15 MG tablet   Commonly known as:  REMERON   Take 1 tablet (15 mg) by mouth At Bedtime   Last time this was given:  15 mg on 5/8/2017  8:10 PM                                mometasone 50 MCG/ACT spray   Commonly known as:  NASONEX   Spray 2 sprays into both nostrils At Bedtime                                   montelukast 10 MG tablet   Commonly known as:  SINGULAIR   Take 1 tablet (10 mg) by mouth At Bedtime   Last time this was given:  10 mg on 5/8/2017  8:10 PM                                   multivitamin, therapeutic Tabs tablet   Take 1 tablet by mouth At Bedtime   Last time this was given:  1 tablet on 5/8/2017  8:10 PM                                   norethindrone-ethinyl estradiol 1-5 MG-MCG per tablet   Commonly known as:  FEMHRT 1/5   Take 1 tablet by mouth daily   Last time this was given:  1 tablet on 5/9/2017  8:44 AM                                   omeprazole 20 MG tablet   Take 1 tablet (20 mg) by mouth daily                                   SUMAtriptan 100 MG tablet   Commonly known as:  IMITREX   Take 1 tablet (100 mg) by mouth at onset of headache (Take every 2 hours as needed. Max 200mg in 24 hours.) Reported on 4/27/2017                                    thiamine 100 MG tablet   Take 1 tablet (100 mg) by mouth daily   Last time this was given:  100 mg on 5/9/2017  8:45 AM                                   topiramate 25 MG tablet   Commonly known as:  TOPAMAX   Take 1 tablet (25 mg) by mouth 2 times daily   Last time this was given:  25 mg on 5/9/2017  8:47 AM                                      vitamin C 500 MG Chew   Take 500 mg by mouth At Bedtime                                             More Information        Depression: Tips to Help Yourself  As your health care providers help treat your depression, you can also help yourself. Keep in mind that your illness affects you emotionally, physically, mentally, and socially. So full recovery will take time. Take care of your body and your soul, and be patient with yourself as you get better.    Be with others  Don t isolate yourself--you ll only feel worse. Try to be with other people. And take part in fun activities when you can. Go to a movie, [employer], [employer] service, or social event. Talk openly with people you can trust. And accept help when it s offered.  Keep your perspective    Depression can cloud your judgment. So wait until you feel better before making major life decisions, such as changing jobs, moving, or getting  or .    This illness is not your fault. Don t blame yourself for your depression.    Recovering from depression is a process. Don t be discouraged if it takes some time to feel better.    Depression saps your energy and concentration. So you won t be able to do all the things you used to do. Set small goals and do what you can.  Take care of your body  People with depression often lose the desire to take care of themselves. That only makes their problems worse. During treatment and afterward, make a point to:    Exercise. It s a great way to take care of your body. And studies have shown that exercise helps fight depression.    Avoid drugs and alcohol. These may  ease the pain in the short term. But they ll only make your problems worse in the long run.    Get relief from stress. Ask your healthcare provider for relaxation exercises and techniques to help relieve stress.    Eat right. A balanced and healthy diet helps keep your body healthy.    3794-3977 [employer]. [address] [place] [address]. All rights reserved. This information is not intended as a substitute for professional medical care. Always follow your healthcare professional's instructions.                Recovering from Addiction  Recovery means making a new life for yourself. This includes finding new interests. It involves building new relationships. It means taking better care of yourself. These will all help you replace substance use with a new and healthier life. They will also help you avoid the things that could make you want to use again.    Make lifestyle changes  A big part of recovery is changing habits. These are habits that may have led to your substance abuse. It s also a time for personal growth. Below are some changes you may want to make.    Find new activities and goals. You may want to try new hobbies and interests. Or, you may want to join an activity group to meet new people.    Build relationships. You may choose to spend more time with loved ones you lost touch with while you were using. You may also want to make new friends. And there may be some friends or family members you will not want to see because they are still using.    Exercise and eat well. Get some physical activity on most days. This can help you feel better. Eat healthy meals with lots of fruits, vegetables, and whole grains. This can also help your well-being. A nutritionist or fitness expert can help you.    Relax and get enough sleep. Good sleep can help you feel better. So can less stress. Ask your counselor about relaxation exercises. These may include meditation. Also ask about stress  management classes.    6084-4789 "[employer] [address] [place] [address], [place] [address]. All rights reserved. This information is not intended as a substitute for professional medical care. Always follow your healthcare professional's instructions.                Understanding the Disease of Addiction  What is addiction?  Addiction is a chronic disease of the brain. It affects how your brain learns and functions.  Your genes and your environment can affect your risk for addiction. A family history of addiction also raises your risk, but anyone can have an addiction.  Unfortunately, many people falsely believe that addiction is a moral weakness. They think that people addicted to drugs or alcohol are simply behaving badly or making poor choices.    How does addiction affect my brain?  Whether you start using drugs or alcohol is your choice. But once your brain is exposed to the addictive substance, your brain begins to change. This is especially true if you are vulnerable to addiction. These brain changes override self-control. This happens because the substance overexcites the reward center in the brain. The substance mimics the brain's own natural feel-good chemicals. The brain is rewired into believing that the substance is a good thing and that you need it to survive. So strong is this rewiring that over time, you no longer find pleasure in other things. The addiction trumps all other motivations.  If you continue to use the substance, your brain makes less and less of its own feel-good chemicals. You then must keep using drugs or alcohol to try to make up for the low levels of the brain chemicals. Your brain eventually needs more and more of the drug to achieve this. You need the drug without regard to the physical, emotional, and social costs.  Can you become addicted to things other than drugs or alcohol?  Addiction can develop in response to other pleasurable activities that stimulate the reward  center of the brain. These activities include eating, having sex, gambling, using tobacco, and even using the internet.  Can you get control over a brain disease?  The only way to get over an addiction is to stop using the substance. By not using it, your brain can recover and return to its normal functioning. You can relearn how to find pleasure in other things. But, your brain will always be at risk for addiction. Because addiction is so powerful, you usually will need medical help and social support for long-term success.    7279-1304 [employer]. [address] [place] [address]. All rights reserved. This information is not intended as a substitute for professional medical care. Always follow your healthcare professional's instructions.

## 2017-05-02 NOTE — PLAN OF CARE
Problem: Goal Outcome Summary  Goal: Goal Outcome Summary  Outcome: No Change  A&O, forgetful. VSS on RA. Tele SR. C/o slight headache but otherwise denies pain. CIWA 2, 1 for tremors earlier and slight HA. Up w/ 1+GB+cane. K recheck at 1830. Plan to tx to Psych when bed available.       Goals to be met before discharge:  -diagnostic tests and consults completed and resulted: partially met--K replacement in progress.  -vital signs normal or at patient baseline: met   -Psych and CD to see, also orthostatics and PT: met.  MD notified that pt goals met except for oral K replacement.

## 2017-05-02 NOTE — PLAN OF CARE
Problem: Goal Outcome Summary  Goal: Goal Outcome Summary  PT per chart review and discussion with care coordinator, pt moving to psych unit, pt with chronic ETOH abuse, multiple falls, needing chem dep treatment.  No Acute care PT needs identified. Will complete order and rec nursing keep pt mobile.  May benefit from OP balance PT if issues not resolved once sober and past any withdrawal.

## 2017-05-03 ENCOUNTER — CARE COORDINATION (OUTPATIENT)
Dept: CARE COORDINATION | Facility: CLINIC | Age: 67
End: 2017-05-03

## 2017-05-03 LAB — POTASSIUM SERPL-SCNC: 3.8 MMOL/L (ref 3.4–5.3)

## 2017-05-03 PROCEDURE — 97150 GROUP THERAPEUTIC PROCEDURES: CPT | Mod: GO

## 2017-05-03 PROCEDURE — 90853 GROUP PSYCHOTHERAPY: CPT

## 2017-05-03 PROCEDURE — A9270 NON-COVERED ITEM OR SERVICE: HCPCS | Mod: GY | Performed by: PSYCHIATRY & NEUROLOGY

## 2017-05-03 PROCEDURE — 12400000 ZZH R&B MH

## 2017-05-03 PROCEDURE — 25000132 ZZH RX MED GY IP 250 OP 250 PS 637: Mod: GY | Performed by: PSYCHIATRY & NEUROLOGY

## 2017-05-03 PROCEDURE — 84132 ASSAY OF SERUM POTASSIUM: CPT | Performed by: PSYCHIATRY & NEUROLOGY

## 2017-05-03 PROCEDURE — 36415 COLL VENOUS BLD VENIPUNCTURE: CPT | Performed by: PSYCHIATRY & NEUROLOGY

## 2017-05-03 RX ORDER — MIRTAZAPINE 15 MG/1
15 TABLET, FILM COATED ORAL AT BEDTIME
Status: DISCONTINUED | OUTPATIENT
Start: 2017-05-03 | End: 2017-05-09 | Stop reason: HOSPADM

## 2017-05-03 RX ADMIN — AZELASTINE HYDROCHLORIDE 2 SPRAY: 137 SPRAY, METERED NASAL at 09:38

## 2017-05-03 RX ADMIN — THERA TABS 1 TABLET: TAB at 20:58

## 2017-05-03 RX ADMIN — Medication 100 MG: at 08:40

## 2017-05-03 RX ADMIN — FOLIC ACID 1 MG: 1 TABLET ORAL at 08:40

## 2017-05-03 RX ADMIN — TOPIRAMATE 25 MG: 25 TABLET, FILM COATED ORAL at 20:58

## 2017-05-03 RX ADMIN — NORETHINDRONE ACETATE AND ETHINYL ESTRADIOL 1 TABLET: 1; 5 TABLET ORAL at 08:40

## 2017-05-03 RX ADMIN — TOPIRAMATE 25 MG: 25 TABLET, FILM COATED ORAL at 08:40

## 2017-05-03 RX ADMIN — METOPROLOL TARTRATE 25 MG: 25 TABLET, FILM COATED ORAL at 08:40

## 2017-05-03 RX ADMIN — FLUTICASONE PROPIONATE 2 SPRAY: 50 SPRAY, METERED NASAL at 20:58

## 2017-05-03 RX ADMIN — MIRTAZAPINE 15 MG: 15 TABLET, FILM COATED ORAL at 20:58

## 2017-05-03 RX ADMIN — MONTELUKAST SODIUM 10 MG: 10 TABLET, FILM COATED ORAL at 20:58

## 2017-05-03 RX ADMIN — LOSARTAN POTASSIUM 100 MG: 100 TABLET, FILM COATED ORAL at 08:40

## 2017-05-03 RX ADMIN — METOPROLOL TARTRATE 25 MG: 25 TABLET, FILM COATED ORAL at 20:58

## 2017-05-03 NOTE — PLAN OF CARE
"Problem: Depressive Symptoms  Goal: Depressive Symptoms  Signs and symptoms of listed problems will be absent or manageable.  66 year old female received from 66 after fall as a result of alcohol abuse. Reports long history of drinking \"2 scotch\" at night. 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 Denies suicidal ideation. Slightly irritable in presentation but increasingly pleasant after adjusting to the milieu.      Nursing assessment complete including patient and medication profiles. Risk assessments completed addressing suicide,fall,skin,nutrition and safety issues. Care plan initiated. Assessments reviewed with physician and admit orders received. Welcome packet reviewed with patient. Information reviewed includes getting emergency help, preventing infections, understanding your care, using medication safely, reducing falls, preventing pressure ulcers, smoking cessation, powerful choices and Patients Bill of Rights. Pt. given tour of the unit and instruction on use of facility including emergency call light. Program schedule reviewed with patient. Questions regarding the unit addressed. Pt. Search completed and belongings inventoried.          "

## 2017-05-03 NOTE — PLAN OF CARE
Problem: General Rehab Plan of Care  Goal: Occupational Therapy Goals  The patient and/or their representative will achieve their patient-specific goals related to the plan of care.  The patient-specific goals include:  INITIAL O.T. ASSESSMENT   Details:  Pt participated in OT group today. Affect was appropriate to situation. Pt struggled with not having her prescription glasses present and opted not to initiate an activity. She did socialize and participate in a group activity, however. 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 She also made a few self depreciating remarks about her abilities during the activity. Some self esteem issues are apparent. In Life Skills group, pt was alert and attentive. Comments reflected tracking and comprehension of concepts discussed. Pt engaged in mindfulness meditation easily. Behavior was organized. Thinking was clear. Attention was good during both structured and unstructured activity.

## 2017-05-03 NOTE — PLAN OF CARE
Problem: Discharge Planning  Goal: Discharge Planning (Adult, OB, Behavioral, Peds)  Patient attended Process Group and participated appropriately. Patient demonstrated good insight into the topic of safety plans.

## 2017-05-03 NOTE — PROGRESS NOTES
[facility] Psychiatric Progress Note       Interim History   The patient's care was discussed with the treatment team and chart notes were reviewed. Pt seen on ITC, recently transferred from . Tolerating medications without side effects. Side effects, risks, and benefits of medications reviewed with patient. Pt is stabilizing on current medications.  She states she was able to obtain sleep last night, but was upset as someone on the unit was yelling in the evening. Again encouraged pt to attend IOP at [facility] to build a support network and develop methods to cope with pt's anxiety and depression. Pt acknowledged. Strongly suggested that pt attend AA meetings and to abstain from using alcohol. Increase Remeron to 15mg qhs. D/C Status 15. Recommended that pt attend groups.   Medications     Current Facility-Administered Medications:    azelastine  2 spray Both Nostrils QAM     folic acid  1 mg Oral Daily     losartan  100 mg Oral Daily     metoprolol (LOPRESSOR) tablet 25 mg  25 mg Oral BID     mirtazapine (REMERON) tablet TABS 7.5 mg  7.5 mg Oral At Bedtime     fluticasone  2 spray Both Nostrils At Bedtime     montelukast (SINGULAIR) tablet 10 mg  10 mg Oral At Bedtime     multivitamin, therapeutic  1 tablet Oral At Bedtime     norethindrone-ethinyl estradiol  1 tablet Oral Daily     thiamine  100 mg Oral Daily     topiramate (TOPAMAX) tablet 25 mg  25 mg Oral BID     PRNs:  acetaminophen, bacitracin, celecoxib, omeprazole (priLOSEC) CR capsule 20 mg, SUMAtriptan      Allergies      Allergies   Allergen Reactions     Nuts [Peanut-Derived] Nausea and Vomiting     Amoxicillin      Morphine         Medical Review of Systems   BP (!) 146/99  Pulse 102  Temp 97.9  F (36.6  C)  Resp 16  There is no height or weight on file to calculate BMI.  A 10-point review of systems was performed by Dr. Balderrama and is negative, no new findings.      Psychiatric Examination     Appearance Sitting  on bed, dressed in scrubs, draped with blanket. Appears stated age.   Attitude Cooperative   Orientation Oriented to person, place, time   Eye Contact Fair   Speech Regular rate, rhythm, volume and tone   Language Normal   Psychomotor Behavior Normal   Mood Slightly less depressed   Affect Flat   Thought Process Goal-Oriented, Intact   Associations Intact   Thought Content Patient is currently negative for suicide ideation, negative for plan or intent, able to contract no self harm and identify barriers to suicide.  Negative for obsessions, compulsions or psychosis.      Fund of Knowledge Average   Insight Impaired   Judgement Slightly improved   Attention Span & Concentration Alert   Recent & Remote Memory Intact   Gait Normal        Labs   Labs reviewed.  No results found for this or any previous visit (from the past 24 hour(s)).       Impression   This is a 66 year old female with depression and alcohol use who will need to attend IOP at [facility] and    Diagnoses   1. Major depression, recurrent, severe, without psychotic features.   2. Alcohol Use Disorder, severe.     Plan     1. Explained side effects, benefits, and complications of medications to the patient, Pt gave verbal consent.  2. Medication changes: Increase Remeron to 15mg qhs.  3. Discussed treatment plan with patient and team.  4. Projected length of stay: 2-3 days, until pt has been stabilized with aftercare in place.    Attestation:   Patient has been seen and evaluated by me, Curtis Balderrama MD.    Patient ID:  Name: Melissa Jean Penrose  MRN: [id number]  Admission: 5/2/2017   YOB: 1950

## 2017-05-03 NOTE — PROGRESS NOTES
05/02/17 2104   Patient Belongings   Disposition of Belongings in locker   Belongings Search Yes   Clothing Search Yes   Second Staff Ludwig     1 pr Socks  Jeans  Sweat shirt  Shoes  Bra  underwear  Earrings, hair clip  Reading glasses  Winter jacket   [facility] Hospital items                          Admission Signature:________________________________________ Date:____________    Discharge Signature:________________________________________ Date:____________

## 2017-05-03 NOTE — PROGRESS NOTES
Clinic Care Coordination Contact  Care Team Conversations    Clinic Care Coordination Contact  Rut Review        Clinical Data: 05/02/2017, patient moved from obs stay to inpt behavioral health. Prior to move patient given info from [facility] for CD treatment as this is a Medicare provider  Psychiatry is recommending that patient do IOP at [facility] for anxiety & depression & to attend AA meetings        Plan:      Clinic Care Coordinator, L[place] to follow up post patient's d/c from inpt as indicated  DIMAS Gonsales, [facility]  Clinic Care Coordinator, [place] with [facility]  [phone]

## 2017-05-04 PROCEDURE — 25000132 ZZH RX MED GY IP 250 OP 250 PS 637: Mod: GY | Performed by: PSYCHIATRY & NEUROLOGY

## 2017-05-04 PROCEDURE — A9270 NON-COVERED ITEM OR SERVICE: HCPCS | Mod: GY | Performed by: PSYCHIATRY & NEUROLOGY

## 2017-05-04 PROCEDURE — 12400000 ZZH R&B MH

## 2017-05-04 RX ADMIN — FLUTICASONE PROPIONATE 2 SPRAY: 50 SPRAY, METERED NASAL at 21:18

## 2017-05-04 RX ADMIN — LOSARTAN POTASSIUM 100 MG: 100 TABLET, FILM COATED ORAL at 09:02

## 2017-05-04 RX ADMIN — METOPROLOL TARTRATE 25 MG: 25 TABLET, FILM COATED ORAL at 21:18

## 2017-05-04 RX ADMIN — Medication 100 MG: at 09:02

## 2017-05-04 RX ADMIN — METOPROLOL TARTRATE 25 MG: 25 TABLET, FILM COATED ORAL at 09:02

## 2017-05-04 RX ADMIN — AZELASTINE HYDROCHLORIDE 2 SPRAY: 137 SPRAY, METERED NASAL at 09:03

## 2017-05-04 RX ADMIN — MONTELUKAST SODIUM 10 MG: 10 TABLET, FILM COATED ORAL at 21:18

## 2017-05-04 RX ADMIN — NORETHINDRONE ACETATE AND ETHINYL ESTRADIOL 1 TABLET: 1; 5 TABLET ORAL at 09:02

## 2017-05-04 RX ADMIN — THERA TABS 1 TABLET: TAB at 21:18

## 2017-05-04 RX ADMIN — TOPIRAMATE 25 MG: 25 TABLET, FILM COATED ORAL at 09:02

## 2017-05-04 RX ADMIN — TOPIRAMATE 25 MG: 25 TABLET, FILM COATED ORAL at 21:18

## 2017-05-04 RX ADMIN — MIRTAZAPINE 15 MG: 15 TABLET, FILM COATED ORAL at 21:18

## 2017-05-04 RX ADMIN — FOLIC ACID 1 MG: 1 TABLET ORAL at 09:02

## 2017-05-04 NOTE — PLAN OF CARE
"Problem: Depressive Symptoms  Goal: Depressive Symptoms  Signs and symptoms of listed problems will be absent or manageable.   Outcome: No Change  Patient displayed short term memory loss this evening. She frequently asked \"where am I.\" Patient had a visit from her friend and POA and seemed to have enjoyed that. Patient ambulated using her cane but requires staff assist to get up from a chair. Patient appears depressed with a flat affect but she is social and pleasant with staff and peers. Patient attended activity group but returned after a short period of time. Group  informed staff that the patient displayed a lot of negativity in her comments during group.       "

## 2017-05-04 NOTE — PLAN OF CARE
Problem: Depressive Symptoms  Goal: Depressive Symptoms  Signs and symptoms of listed problems will be absent or manageable.   Outcome: No Change  Pt presents with a flat/blunt affect, withdrawn, and depressed. Appears to be absent of thought. Pt visible around unit, spent time watching tv in the lounge and reading the newspaper in room. Ate breakfast and lunch in the lounge. Pt needs assistance from staff to get up from chair and toilet.

## 2017-05-04 NOTE — PROGRESS NOTES
"[facility] Psychiatric Progress Note       Interim History   The patient's care was discussed with the treatment team and chart notes were reviewed. Pt seen on ITC Tolerating medications without side effects. Side effects, risks, and benefits of medications reviewed with patient. Pt is stabilizing on current medications.  She exhibited memory issues as she could not recall that Dr. Balderrama was her provider. She has a medical POA through her friend, Reena, they are attempting to place her in senior living at [place]. She endorses fair sleep architecture. Neuropsych testing to be ordered in order to determine pt's cognitive capacity. When asked about her anxiety and depression, she responded \"I don't remember if I did.\" She is unable to exercise an opinion about being placed in senior living. She believes it is the 18th of May in the year 2017.    Medications     Current Facility-Administered Medications:    mirtazapine (REMERON) tablet 15 mg  15 mg Oral At Bedtime     azelastine  2 spray Both Nostrils QAM     folic acid  1 mg Oral Daily     losartan  100 mg Oral Daily     metoprolol (LOPRESSOR) tablet 25 mg  25 mg Oral BID     fluticasone  2 spray Both Nostrils At Bedtime     montelukast (SINGULAIR) tablet 10 mg  10 mg Oral At Bedtime     multivitamin, therapeutic  1 tablet Oral At Bedtime     norethindrone-ethinyl estradiol  1 tablet Oral Daily     thiamine  100 mg Oral Daily     topiramate (TOPAMAX) tablet 25 mg  25 mg Oral BID     PRNs:  acetaminophen, bacitracin, celecoxib, omeprazole (priLOSEC) CR capsule 20 mg, SUMAtriptan      Allergies      Allergies   Allergen Reactions     Nuts [Peanut-Derived] Nausea and Vomiting     Amoxicillin      Morphine         Medical Review of Systems   /75  Pulse 75  Temp 97.8  F (36.6  C) (Oral)  Resp 16  There is no height or weight on file to calculate BMI.  A 10-point review of systems was performed by Dr. Balderrama and is negative, no new " findings.      Psychiatric Examination     Appearance Sitting on bed, dressed in scrubs, draped with blanket. Appears stated age.   Attitude Cooperative   Orientation Oriented to person, place   Eye Contact Fair   Speech Regular rate, rhythm, volume and tone   Language Normal   Psychomotor Behavior Normal   Mood Slightly less depressed, neutral   Affect Flat   Thought Process Goal-Oriented, Intact   Associations Intact   Thought Content Patient is currently negative for suicide ideation, negative for plan or intent, able to contract no self harm and identify barriers to suicide.  Negative for obsessions, compulsions or psychosis.      Fund of Knowledge Average   Insight Impaired   Judgement Slightly improved   Attention Span & Concentration Impaired   Recent & Remote Memory Deficient   Gait Normal        Labs   Labs reviewed.  No results found for this or any previous visit (from the past 24 hour(s)).       Impression   This is a 66 year old female with depression and alcohol use who will need to attend IOP at [facility] and her cognitive functioning will need to be assessed due to evident memory deficits.   Diagnoses   1. Major depression, recurrent, severe, without psychotic features.   2. Alcohol Use Disorder, severe.     Plan     1. Explained side effects, benefits, and complications of medications to the patient, Pt gave verbal consent.  2. Medication changes: None.  3. Discussed treatment plan with patient and team.  4. Projected length of stay: 4-5 days, until pt has been stabilized with aftercare in place.  5. Neuropsych testing.    Attestation:   Patient has been seen and evaluated by me, Curtis Balderrama MD.    Patient ID:  Name: Melissa Jean Penrose  MRN: [id number]  Admission: 5/2/2017   YOB: 1950

## 2017-05-04 NOTE — PLAN OF CARE
Problem: Discharge Planning  Goal: Discharge Planning (Adult, OB, Behavioral, Peds)   received a call this morning from patient's longtime friend Reena Geller ([phone]), who is also patient's healthcare power of . She stated that prior to patient's admission that she had been looking into having patient move into [facility]. She stated that they would like to send out a nurse to assess patient for possible placement. She stated that patient has been living by herself since her   sixteen years ago. She has no children and no close relatives in the area.  She stated that patient had home care services through [facility], but that recently patient had been quite obstinate and was refusing services. Her last home care worker quit. She stated that patient had been working with a /psychologist, Nasir Betts, through [facility].      This afternoon, two nurses from [facility], Katherin Flowers RN and Sierra, came out to assess patient. Patient signed an BI for [facility], so  met with them after they assessed patient.  discussed patient's history with them and gave them a copy of the history and physical along with patient's medication list. They dropped off a packet of paperwork for physician to complete prior to admission to [facility].  will keep them updated on discharge plans.  also received a call from Collette Merritt in admissions at [facility] ([phone] / [phone] cell) and updated her on patient's status.

## 2017-05-05 PROCEDURE — 12400000 ZZH R&B MH

## 2017-05-05 PROCEDURE — A9270 NON-COVERED ITEM OR SERVICE: HCPCS | Mod: GY | Performed by: PSYCHIATRY & NEUROLOGY

## 2017-05-05 PROCEDURE — 25000132 ZZH RX MED GY IP 250 OP 250 PS 637: Mod: GY | Performed by: PSYCHIATRY & NEUROLOGY

## 2017-05-05 RX ADMIN — AZELASTINE HYDROCHLORIDE 2 SPRAY: 137 SPRAY, METERED NASAL at 08:30

## 2017-05-05 RX ADMIN — THERA TABS 1 TABLET: TAB at 21:14

## 2017-05-05 RX ADMIN — METOPROLOL TARTRATE 25 MG: 25 TABLET, FILM COATED ORAL at 08:30

## 2017-05-05 RX ADMIN — TOPIRAMATE 25 MG: 25 TABLET, FILM COATED ORAL at 08:30

## 2017-05-05 RX ADMIN — Medication 100 MG: at 08:29

## 2017-05-05 RX ADMIN — FLUTICASONE PROPIONATE 2 SPRAY: 50 SPRAY, METERED NASAL at 21:14

## 2017-05-05 RX ADMIN — NORETHINDRONE ACETATE AND ETHINYL ESTRADIOL 1 TABLET: 1; 5 TABLET ORAL at 08:30

## 2017-05-05 RX ADMIN — FOLIC ACID 1 MG: 1 TABLET ORAL at 08:30

## 2017-05-05 RX ADMIN — LOSARTAN POTASSIUM 100 MG: 100 TABLET, FILM COATED ORAL at 08:30

## 2017-05-05 RX ADMIN — MIRTAZAPINE 15 MG: 15 TABLET, FILM COATED ORAL at 21:14

## 2017-05-05 RX ADMIN — MONTELUKAST SODIUM 10 MG: 10 TABLET, FILM COATED ORAL at 21:14

## 2017-05-05 RX ADMIN — TOPIRAMATE 25 MG: 25 TABLET, FILM COATED ORAL at 21:14

## 2017-05-05 RX ADMIN — METOPROLOL TARTRATE 25 MG: 25 TABLET, FILM COATED ORAL at 21:14

## 2017-05-05 NOTE — PROGRESS NOTES
Pt's POA Sonam Geller called for update. Told her that pt was more engaged and spent a couple of hours over on SDU and would be moving into a room over there later this afternoon.

## 2017-05-05 NOTE — PLAN OF CARE
"Problem: General Rehab Plan of Care  Goal: Occupational Therapy Goals  The patient and/or their representative will achieve their patient-specific goals related to the plan of care.  The patient-specific goals include:  Pt will explore and practice coping skills to reduce and manage stressors in daily life.   At the start of Exercise group, pt was present in the lounge watching the news. Pt was told that the TV would be turned off for group and responded by stating, \"But I'm watching this.\" Pt became increasingly obstinate and agitated. She attempted to gather support from her peers and refused to allow the TV to be turned off. Eventually, pt left the room. Thinking was markedly concrete and self focused. Pt returned to the lounge after group ended and asked, \"What did I miss?\" She later asked this writer for help finding her room. Pt may be exhibiting short term memory issues.       "

## 2017-05-05 NOTE — PLAN OF CARE
Problem: Discharge Planning  Goal: Discharge Planning (Adult, OB, Behavioral, Peds)   received a call from Rachel in admissions at [facility] in [place] ([phone] / [phone] cell / [phone] general / [phone] fax) requesting an update on patient. She stated that they will need at least one day notice of admission so that they can get everything ready for patient. They will need Dr. Balderrama to complete the Physician/Healthcare Provider Plan of Care and fax this back to them on Monday 05/08/17. They will also need a list of patient's medications faxed over as well as physician orders at time of discharge. This placement is being coordinated with patient's friend/healthcare power of , Reena Geller ([phone]).

## 2017-05-05 NOTE — PLAN OF CARE
"Problem: Depressive Symptoms  Goal: Depressive Symptoms  Signs and symptoms of listed problems will be absent or manageable.   Outcome: No Change  Mood was calm, flat affect. Out in lounge for breakfast and some TV time. Pleasant and cooperative. Initially resisted going over to SDU and doing groups because she stated \"that I like to spend time by myself and I don't like to talk much\". Was eventually coaxed over there and was appropriate. Moved over to SDU, after lunch, later complained of not wanting to be in double room and insisted she was going to leave if room situation not changed. After discussion and reassurance of why it was more appropriate for her to be where she was, she was ok with it.      "

## 2017-05-05 NOTE — PLAN OF CARE
Problem: Depressive Symptoms  Goal: Depressive Symptoms  Signs and symptoms of listed problems will be absent or manageable.   Outcome: No Change  Pt appears to be depressed and withdrawn. She spent the first part of the shift in the [facility] lounge watching TV, and spent some time on the SDU after dinner. Pt engaged in conversation with this writer about current events, and various topics. Pt displayed clear, and organized thinking in conversation. Pt did not need staff assistance getting up this shift. Later in the evening, pt appeared to be confused after returning from the SDU. Pt stated that she could not remember where her room was, and she thought that she had spent all day on the SDU.

## 2017-05-05 NOTE — PROGRESS NOTES
Clinic Care Coordination Contact  Care Team Conversations    Clinic Care Coordination Contact  Clinic Care Coordination Chart Review     Situation: Patient chart reviewed by care coordinator.    Background: Patient remains in hospital for mental health    Assessment: Info in chart for today state that, patient was accessed for [facility] on 05/04/2017. Pyschiatrst told patient that going home to live inependently with her short term memory issues & her alcohol use is not appropriate to her needs  Neuropsychic test is pending  Patient requesting to leave today (05/05/2017). Psychiatrist will put patient on hold for 72 hours if she insists on leaving    Plan/Recommendations: Patient to remain inpt for 4-5 more days to stabilize  Clinic Care Coordinator, [place] to follow with POA or patient as indicated after patient's d/c    DIMAS Gonsales, [facility]  Clinic Care Coordinator, [place] with [facility]  [phone]

## 2017-05-05 NOTE — PROVIDER NOTIFICATION
Charito BROWN Notified Dr. Balderrama that pt was unhappy with her room in SDU and that she wanted to leave. Dr Balderrama said that she wasn't well enough to  leave and would put her on 72 hr hold if she insisted on going.

## 2017-05-05 NOTE — PROGRESS NOTES
[facility] Psychiatric Progress Note       Interim History   The patient's care was discussed with the treatment team and chart notes were reviewed. Pt seen on ITC Tolerating medications without side effects. Side effects, risks, and benefits of medications reviewed with patient. Pt is stabilizing on current medications. Pt met with staff from [facility] yesterday to assess pt. Again reminded pt that her profound memory deficits and her alcohol use at home do not place her in an appropriate situation where she can return home independently and that [facility] may be her best option to receive additional services. She is hesitant to move as she does not wish to lose her grand piano. Awaiting neuropsych testing.   Medications     Current Facility-Administered Medications:    mirtazapine (REMERON) tablet 15 mg  15 mg Oral At Bedtime     azelastine  2 spray Both Nostrils QAM     folic acid  1 mg Oral Daily     losartan  100 mg Oral Daily     metoprolol (LOPRESSOR) tablet 25 mg  25 mg Oral BID     fluticasone  2 spray Both Nostrils At Bedtime     montelukast (SINGULAIR) tablet 10 mg  10 mg Oral At Bedtime     multivitamin, therapeutic  1 tablet Oral At Bedtime     norethindrone-ethinyl estradiol  1 tablet Oral Daily     thiamine  100 mg Oral Daily     topiramate (TOPAMAX) tablet 25 mg  25 mg Oral BID     PRNs:  acetaminophen, bacitracin, celecoxib, omeprazole (priLOSEC) CR capsule 20 mg, SUMAtriptan      Allergies      Allergies   Allergen Reactions     Nuts [Peanut-Derived] Nausea and Vomiting     Amoxicillin      Morphine         Medical Review of Systems   /71  Pulse 73  Temp 97.7  F (36.5  C) (Oral)  Resp 17  There is no height or weight on file to calculate BMI.  A 10-point review of systems was performed by Dr. Balderrama and is negative, no new findings.      Psychiatric Examination     Appearance Laying in bed, dressed in scrubs, draped with blanket. Appears stated age.   Attitude  Cooperative   Orientation Oriented to person, place   Eye Contact Fair   Speech Regular rate, rhythm, volume and tone   Language Normal   Psychomotor Behavior Normal   Mood Neutral   Affect Flat   Thought Process Intact, Linear   Associations Intact   Thought Content Patient is currently negative for suicide ideation, negative for plan or intent, able to contract no self harm and identify barriers to suicide.  Negative for obsessions, compulsions or psychosis.      Fund of Knowledge Average   Insight Impaired   Judgement Slightly improved   Attention Span & Concentration Impaired   Recent & Remote Memory Deficient   Gait Normal        Labs   Labs reviewed.  No results found for this or any previous visit (from the past 24 hour(s)).       Impression   This is a 66 year old female with depression and alcohol use who will need to attend IOP at [facility] and her cognitive functioning will need to be assessed due to evident memory deficits.   Diagnoses   1. Major depression, recurrent, severe, without psychotic features.   2. Alcohol Use Disorder, severe.     Plan     1. Explained side effects, benefits, and complications of medications to the patient, Pt gave verbal consent.  2. Medication changes: None.  3. Discussed treatment plan with patient and team.  4. Projected length of stay: 4-5 days, until pt has been stabilized with aftercare in place.  5. Neuropsych testing.    Attestation:   Patient has been seen and evaluated by me, Curtis Balderrama MD.    Patient ID:  Name: Melissa Jean Penrose  MRN: [id number]  Admission: 5/2/2017   YOB: 1950

## 2017-05-06 PROCEDURE — A9270 NON-COVERED ITEM OR SERVICE: HCPCS | Mod: GY | Performed by: PSYCHIATRY & NEUROLOGY

## 2017-05-06 PROCEDURE — 25000132 ZZH RX MED GY IP 250 OP 250 PS 637: Mod: GY | Performed by: PSYCHIATRY & NEUROLOGY

## 2017-05-06 PROCEDURE — 97161 PT EVAL LOW COMPLEX 20 MIN: CPT | Mod: GP

## 2017-05-06 PROCEDURE — 40000193 ZZH STATISTIC PT WARD VISIT

## 2017-05-06 PROCEDURE — 12400000 ZZH R&B MH

## 2017-05-06 RX ADMIN — TOPIRAMATE 25 MG: 25 TABLET, FILM COATED ORAL at 08:45

## 2017-05-06 RX ADMIN — NORETHINDRONE ACETATE AND ETHINYL ESTRADIOL 1 TABLET: 1; 5 TABLET ORAL at 08:44

## 2017-05-06 RX ADMIN — MONTELUKAST SODIUM 10 MG: 10 TABLET, FILM COATED ORAL at 21:25

## 2017-05-06 RX ADMIN — METOPROLOL TARTRATE 25 MG: 25 TABLET, FILM COATED ORAL at 21:24

## 2017-05-06 RX ADMIN — METOPROLOL TARTRATE 25 MG: 25 TABLET, FILM COATED ORAL at 09:02

## 2017-05-06 RX ADMIN — MIRTAZAPINE 15 MG: 15 TABLET, FILM COATED ORAL at 21:25

## 2017-05-06 RX ADMIN — THERA TABS 1 TABLET: TAB at 21:24

## 2017-05-06 RX ADMIN — LOSARTAN POTASSIUM 100 MG: 100 TABLET, FILM COATED ORAL at 09:02

## 2017-05-06 RX ADMIN — Medication 100 MG: at 08:44

## 2017-05-06 RX ADMIN — FLUTICASONE PROPIONATE 2 SPRAY: 50 SPRAY, METERED NASAL at 21:24

## 2017-05-06 RX ADMIN — TOPIRAMATE 25 MG: 25 TABLET, FILM COATED ORAL at 21:25

## 2017-05-06 RX ADMIN — FOLIC ACID 1 MG: 1 TABLET ORAL at 08:44

## 2017-05-06 RX ADMIN — AZELASTINE HYDROCHLORIDE 2 SPRAY: 137 SPRAY, METERED NASAL at 08:44

## 2017-05-06 ASSESSMENT — ACTIVITIES OF DAILY LIVING (ADL)
DRESS: WITH ASSISTANCE
LAUNDRY: WITH SUPERVISION
ORAL_HYGIENE: WITH ASSISTANCE
DRESS: WITH ASSISTANCE
HYGIENE/GROOMING: INDEPENDENT
GROOMING: BATH
ORAL_HYGIENE: WITH ASSISTANCE
LAUNDRY: WITH SUPERVISION

## 2017-05-06 NOTE — PLAN OF CARE
"Problem: Goal Outcome Summary  Goal: Goal Outcome Summary  PT: Orders received, evaluation completed. 66 year old female admitted to hospital for short term memory loss, history of fall. Patient lives at home alone. Reports 7 steps to get into house and no steps needed to access once within. Reports cleaning service and \"helper\" come 1x/week each for 4 hours. Reports modified independence with transfers, toileting, dressing and driving. Currently, ambulates 80' x 2 with SEC and SBA. Patient demonstrates no LOB with head turns. Good gait speed noted. Sit to/from stand with SBA. No skilled acute care PT care needs identified at this time. Patient appears to be moving at baseline levels. From a mobility standpoint, recommend discharge to home with continued use of SEC for all mobility. Defer to psych/medical team for safe discharge plan. Noted in  note power of  suggested transition to memory care unit. PT would be in agreement. Will complete order. Spoke with RN.          "

## 2017-05-06 NOTE — PLAN OF CARE
Problem: Depressive Symptoms  Goal: Depressive Symptoms  Signs and symptoms of listed problems will be absent or manageable.   Outcome: No Change  Pt is present on the unit and has been spending time with peers in the lounge watching TV through most of the shift. Pt does have some confusion at times and often forgets where she is and what she is doing. Pt often forgets where she places her things and does forget what she was trying to do. Pt is a bit blunt and matter of fact when speaking to staff. Pt had commented on staff's name and stated well thank god I wasn't named after that. Pt did not show orthostatic hypotension and has been feeling less dizzy. Pt encouraged to drink fluids and make sure she is hydrated.

## 2017-05-06 NOTE — PROGRESS NOTES
"   05/06/17 1500   Quick Adds   Type of Visit Initial PT Evaluation   Living Environment   Lives With alone   Living Arrangements house   Home Accessibility stairs to enter home   Number of Stairs to Enter Home 7   Number of Stairs Within Home 0   Transportation Available car;family or friend will provide   Living Environment Comment patient not reliable historian   Self-Care   Usual Activity Tolerance moderate   Current Activity Tolerance moderate   Regular Exercise no   Equipment Currently Used at Home cane, straight   Activity/Exercise/Self-Care Comment Uses SEC on stairs and outside home. Drives within neighborhood and to grocery store. Uses electronic cart at store.   Functional Level Prior   Ambulation 1-->assistive equipment   Transferring 1-->assistive equipment   Toileting 1-->assistive equipment   Bathing 1-->assistive equipment   Dressing 0-->independent   Eating 0-->independent   Fall history within last six months yes   Number of times patient has fallen within last six months 1   Which of the above functional risks had a recent onset or change? 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 Patient reports modified independence with mobility with use of SEC.   General Information   Onset of Illness/Injury or Date of Surgery - Date 05/02/17   Referring Physician [place]   Patient/Family Goals Statement Return home   Pertinent History of Current Problem (include personal factors and/or comorbidities that impact the POC) 66 year old female admitted to hospital for short term memory loss, history of fall.   Precautions/Limitations fall precautions   Cognitive Status Examination   Orientation orientation to person, place and time   Level of Consciousness alert   Follows Commands and Answers Questions 100% of the time   Personal Safety and Judgment (RN notes patient unsteady on feet in mornings)   Pain Assessment   Patient " "Currently in Pain No   Integumentary/Edema   Integumentary/Edema Comments bruising throuhgout nose   Posture    Posture Not impaired   Range of Motion (ROM)   ROM Comment B LE WNL for functional mobility   Strength   Strength Comments B LE WNL for functional mobility   Bed Mobility   Bed Mobility Comments no deficits identified   Transfer Skills   Transfer Comments sit to/from stand with SBA   Gait   Gait Comments ambulated 80' x 2 with SEC and SBA   Balance   Balance Comments no LOB with vertical and horizontal head turns in gait   Sensory Examination   Sensory Perception Comments no numbness/tingling reported   Coordination   Coordination no deficits were identified   Muscle Tone   Muscle Tone no deficits were identified   Clinical Impression   Criteria for Skilled Therapeutic Intervention evaluation only   Clinical Presentation Stable/Uncomplicated   Clinical Presentation Rationale medically stable, PLOF, clinical judgement   Clinical Decision Making (Complexity) Low complexity   Therapy Frequency` other (see comments)  (eval only)   Predicted Duration of Therapy Intervention (days/wks) 1 day   Anticipated Discharge Disposition Home;Home with Assist  (mobility standpoint- functionally safe to return home)   Risk & Benefits of therapy have been explained Yes   Patient, Family & other staff in agreement with plan of care Yes   Clinical Impression Comments Patient does not need skilled PT care at this time.   [facility] Vite TM \"6 Clicks\"   2016, Trustees of [facility], under license to [employer].  All rights reserved.   6 Clicks Short Forms Basic Mobility Inpatient Short Form   [facility] [employer]  \"6 Clicks\" V.2 Basic Mobility Inpatient Short Form   1. Turning from your back to your side while in a flat bed without using bedrails? 4 - None   2. Moving from lying on your back to sitting on the side of a flat bed without using bedrails? 4 - None   3. Moving to and from a bed to a chair " (including a wheelchair)? 4 - None   4. Standing up from a chair using your arms (e.g., wheelchair, or bedside chair)? 4 - None   5. To walk in hospital room? 4 - None   6. Climbing 3-5 steps with a railing? 3 - A Little   Basic Mobility Raw Score (Score out of 24.Lower scores equate to lower levels of function) 23   Total Evaluation Time   Total Evaluation Time (Minutes) 15

## 2017-05-06 NOTE — PLAN OF CARE
"Problem: Depressive Symptoms  Goal: Depressive Symptoms  Signs and symptoms of listed problems will be absent or manageable.   Patient\"s bed was soaked with urine this AM. Pt stated that she was dizzy  While sitting up pt C/O dizziness . Pt sat at side of bed for a couple minutes and then said she was ok to walk to bathroom where she was cleaned up and clothes changed. PT was ordered to evaluate for a walker. Pt was escorted down to [facility] for lunch and then attended OT and at 1430 states she is [facility] [place]      "

## 2017-05-07 PROCEDURE — 25000132 ZZH RX MED GY IP 250 OP 250 PS 637: Mod: GY | Performed by: PSYCHIATRY & NEUROLOGY

## 2017-05-07 PROCEDURE — A9270 NON-COVERED ITEM OR SERVICE: HCPCS | Mod: GY | Performed by: PSYCHIATRY & NEUROLOGY

## 2017-05-07 PROCEDURE — 12400000 ZZH R&B MH

## 2017-05-07 RX ADMIN — TOPIRAMATE 25 MG: 25 TABLET, FILM COATED ORAL at 20:46

## 2017-05-07 RX ADMIN — FLUTICASONE PROPIONATE 2 SPRAY: 50 SPRAY, METERED NASAL at 20:46

## 2017-05-07 RX ADMIN — AZELASTINE HYDROCHLORIDE 2 SPRAY: 137 SPRAY, METERED NASAL at 08:47

## 2017-05-07 RX ADMIN — METOPROLOL TARTRATE 25 MG: 25 TABLET, FILM COATED ORAL at 08:47

## 2017-05-07 RX ADMIN — Medication 100 MG: at 08:47

## 2017-05-07 RX ADMIN — NORETHINDRONE ACETATE AND ETHINYL ESTRADIOL 1 TABLET: 1; 5 TABLET ORAL at 08:47

## 2017-05-07 RX ADMIN — MIRTAZAPINE 15 MG: 15 TABLET, FILM COATED ORAL at 20:46

## 2017-05-07 RX ADMIN — MONTELUKAST SODIUM 10 MG: 10 TABLET, FILM COATED ORAL at 20:46

## 2017-05-07 RX ADMIN — FOLIC ACID 1 MG: 1 TABLET ORAL at 08:47

## 2017-05-07 RX ADMIN — TOPIRAMATE 25 MG: 25 TABLET, FILM COATED ORAL at 08:47

## 2017-05-07 RX ADMIN — METOPROLOL TARTRATE 25 MG: 25 TABLET, FILM COATED ORAL at 20:46

## 2017-05-07 RX ADMIN — THERA TABS 1 TABLET: TAB at 20:46

## 2017-05-07 RX ADMIN — LOSARTAN POTASSIUM 100 MG: 100 TABLET, FILM COATED ORAL at 08:47

## 2017-05-07 ASSESSMENT — ACTIVITIES OF DAILY LIVING (ADL)
LAUNDRY: WITH SUPERVISION
DRESS: INDEPENDENT
ORAL_HYGIENE: INDEPENDENT
GROOMING: INDEPENDENT

## 2017-05-07 NOTE — PLAN OF CARE
Problem: Depressive Symptoms  Goal: Depressive Symptoms  Signs and symptoms of listed problems will be absent or manageable.   Patient more active on the unit and got up by herself to go to the bathroom and then walked down the hallway with one standby assist. Pt slightly unsteady but does use her cane. Pt generally more pleasant and engaged and states that she liked the Spirituality group. Pt states she understands she will need to be in TCU when she leaves here. Will have patient shower and evaluate her ADLs

## 2017-05-08 LAB
BACTERIA SPEC CULT: NO GROWTH
BACTERIA SPEC CULT: NO GROWTH
Lab: NORMAL
Lab: NORMAL
MICRO REPORT STATUS: NORMAL
MICRO REPORT STATUS: NORMAL
SPECIMEN SOURCE: NORMAL
SPECIMEN SOURCE: NORMAL

## 2017-05-08 PROCEDURE — 12400000 ZZH R&B MH

## 2017-05-08 PROCEDURE — 25000125 ZZHC RX 250: Performed by: PSYCHIATRY & NEUROLOGY

## 2017-05-08 PROCEDURE — 25000132 ZZH RX MED GY IP 250 OP 250 PS 637: Mod: GY | Performed by: PSYCHIATRY & NEUROLOGY

## 2017-05-08 PROCEDURE — 97150 GROUP THERAPEUTIC PROCEDURES: CPT | Mod: GO

## 2017-05-08 PROCEDURE — A9270 NON-COVERED ITEM OR SERVICE: HCPCS | Mod: GY | Performed by: PSYCHIATRY & NEUROLOGY

## 2017-05-08 PROCEDURE — 99207 ZZC NO CHARGE VISIT/PATIENT NOT SEEN: CPT | Performed by: PHYSICIAN ASSISTANT

## 2017-05-08 PROCEDURE — 90853 GROUP PSYCHOTHERAPY: CPT

## 2017-05-08 RX ORDER — SUMATRIPTAN 100 MG/1
100 TABLET, FILM COATED ORAL
Qty: 30 TABLET | Refills: 0 | Status: SHIPPED | OUTPATIENT
Start: 2017-05-08 | End: 2017-10-25

## 2017-05-08 RX ORDER — ACETAMINOPHEN 325 MG/1
650 TABLET ORAL EVERY 6 HOURS PRN
Qty: 100 TABLET | Refills: 0 | Status: SHIPPED | OUTPATIENT
Start: 2017-05-08 | End: 2018-01-12

## 2017-05-08 RX ORDER — FOLIC ACID 1 MG/1
1 TABLET ORAL DAILY
Qty: 30 TABLET | Refills: 0 | Status: SHIPPED | OUTPATIENT
Start: 2017-05-08 | End: 2018-01-03

## 2017-05-08 RX ORDER — LANOLIN ALCOHOL/MO/W.PET/CERES
100 CREAM (GRAM) TOPICAL DAILY
Qty: 30 TABLET | Refills: 0 | Status: SHIPPED | OUTPATIENT
Start: 2017-05-08 | End: 2018-01-03

## 2017-05-08 RX ORDER — MONTELUKAST SODIUM 10 MG/1
10 TABLET ORAL AT BEDTIME
Qty: 30 TABLET | Refills: 0 | Status: SHIPPED | OUTPATIENT
Start: 2017-05-08 | End: 2018-01-08

## 2017-05-08 RX ORDER — AZELASTINE 1 MG/ML
2 SPRAY, METERED NASAL EVERY MORNING
Qty: 1 BOTTLE | Refills: 0 | Status: SHIPPED | OUTPATIENT
Start: 2017-05-08 | End: 2018-01-17

## 2017-05-08 RX ORDER — MULTIVITAMIN,THERAPEUTIC
1 TABLET ORAL AT BEDTIME
Qty: 30 TABLET | Refills: 0 | Status: SHIPPED | OUTPATIENT
Start: 2017-05-08 | End: 2017-06-07

## 2017-05-08 RX ORDER — MIRTAZAPINE 15 MG/1
15 TABLET, FILM COATED ORAL AT BEDTIME
Qty: 30 TABLET | Refills: 0 | Status: SHIPPED | OUTPATIENT
Start: 2017-05-08 | End: 2017-05-09

## 2017-05-08 RX ORDER — TOPIRAMATE 25 MG/1
25 TABLET, FILM COATED ORAL 2 TIMES DAILY
Qty: 60 TABLET | Refills: 0 | Status: SHIPPED | OUTPATIENT
Start: 2017-05-08 | End: 2018-01-29

## 2017-05-08 RX ORDER — MOMETASONE FUROATE MONOHYDRATE 50 UG/1
2 SPRAY, METERED NASAL AT BEDTIME
Qty: 17 G | Refills: 0 | Status: SHIPPED | OUTPATIENT
Start: 2017-05-08

## 2017-05-08 RX ORDER — METOPROLOL TARTRATE 25 MG/1
25 TABLET, FILM COATED ORAL 2 TIMES DAILY
Qty: 60 TABLET | Refills: 0 | Status: SHIPPED | OUTPATIENT
Start: 2017-05-08 | End: 2018-01-29

## 2017-05-08 RX ORDER — LOSARTAN POTASSIUM 100 MG/1
100 TABLET ORAL DAILY
Qty: 30 TABLET | Refills: 0 | Status: SHIPPED | OUTPATIENT
Start: 2017-05-08 | End: 2018-01-03

## 2017-05-08 RX ORDER — NICOTINE POLACRILEX 4 MG/1
20 GUM, CHEWING ORAL DAILY
Qty: 30 TABLET | Refills: 0 | Status: SHIPPED | OUTPATIENT
Start: 2017-05-08 | End: 2018-01-03

## 2017-05-08 RX ADMIN — AZELASTINE HYDROCHLORIDE 2 SPRAY: 137 SPRAY, METERED NASAL at 09:20

## 2017-05-08 RX ADMIN — FLUTICASONE PROPIONATE 2 SPRAY: 50 SPRAY, METERED NASAL at 20:10

## 2017-05-08 RX ADMIN — MIRTAZAPINE 15 MG: 15 TABLET, FILM COATED ORAL at 20:10

## 2017-05-08 RX ADMIN — MONTELUKAST SODIUM 10 MG: 10 TABLET, FILM COATED ORAL at 20:10

## 2017-05-08 RX ADMIN — TOPIRAMATE 25 MG: 25 TABLET, FILM COATED ORAL at 20:10

## 2017-05-08 RX ADMIN — FLUTICASONE PROPIONATE 2 SPRAY: 50 SPRAY, METERED NASAL at 09:21

## 2017-05-08 RX ADMIN — THERA TABS 1 TABLET: TAB at 20:10

## 2017-05-08 RX ADMIN — Medication 100 MG: at 09:21

## 2017-05-08 RX ADMIN — FOLIC ACID 1 MG: 1 TABLET ORAL at 09:21

## 2017-05-08 RX ADMIN — NORETHINDRONE ACETATE AND ETHINYL ESTRADIOL 1 TABLET: 1; 5 TABLET ORAL at 09:22

## 2017-05-08 RX ADMIN — TUBERCULIN PURIFIED PROTEIN DERIVATIVE 5 UNITS: 5 INJECTION, SOLUTION INTRADERMAL at 15:40

## 2017-05-08 RX ADMIN — METOPROLOL TARTRATE 25 MG: 25 TABLET, FILM COATED ORAL at 09:38

## 2017-05-08 RX ADMIN — CELECOXIB 200 MG: 200 CAPSULE ORAL at 14:30

## 2017-05-08 RX ADMIN — TOPIRAMATE 25 MG: 25 TABLET, FILM COATED ORAL at 09:22

## 2017-05-08 RX ADMIN — METOPROLOL TARTRATE 25 MG: 25 TABLET, FILM COATED ORAL at 20:13

## 2017-05-08 RX ADMIN — LOSARTAN POTASSIUM 100 MG: 100 TABLET, FILM COATED ORAL at 09:22

## 2017-05-08 NOTE — PROGRESS NOTES
Paperwork for [facility] was completed and faced over. Katherin called back, verifying she had received it. [place] requires all medications ordered and orders signed by physician by hand- and that these be faxed to them ASAP in morning of 5/9/17 so they can be reviewed and patient ok'd for admission.     Call placed to POA for health care, Reena Geller, to inform her of above. Reena will either have someone pick patient up for discharge or arrange for facility to pick her up when discharge date/time is certain.

## 2017-05-08 NOTE — PROGRESS NOTES
[facility] Psychiatric Progress Note       Interim History   The patient's care was discussed with the treatment team and chart notes were reviewed. Pt seen on SDU. Tolerating medications without side effects. Side effects, risks, and benefits of medications reviewed with patient. Pt is stabilizing on current medications. Pt is willing to discharge to [place]. She will discharge today. 30 day supply of medication provided at time of discharge. Denies suicidal or homicidal ideation. She will follow up with her PCP to continue with Remeron. Pt to find the phone number for her POAReena. Neuropsych testing will be completed today.   Medications     Current Facility-Administered Medications:    mirtazapine (REMERON) tablet 15 mg  15 mg Oral At Bedtime     azelastine  2 spray Both Nostrils QAM     folic acid  1 mg Oral Daily     losartan  100 mg Oral Daily     metoprolol (LOPRESSOR) tablet 25 mg  25 mg Oral BID     fluticasone  2 spray Both Nostrils At Bedtime     montelukast (SINGULAIR) tablet 10 mg  10 mg Oral At Bedtime     multivitamin, therapeutic  1 tablet Oral At Bedtime     norethindrone-ethinyl estradiol  1 tablet Oral Daily     thiamine  100 mg Oral Daily     topiramate (TOPAMAX) tablet 25 mg  25 mg Oral BID     PRNs:  acetaminophen, bacitracin, celecoxib, omeprazole (priLOSEC) CR capsule 20 mg, SUMAtriptan      Allergies      Allergies   Allergen Reactions     Nuts [Peanut-Derived] Nausea and Vomiting     Amoxicillin      Morphine         Medical Review of Systems   /64  Pulse 86  Temp 98  F (36.7  C) (Oral)  Resp 16  There is no height or weight on file to calculate BMI.  A 10-point review of systems was performed by Dr. Balderrama and is negative, no new findings.      Psychiatric Examination     Appearance Sitting in chair, dressed in scrubs, draped with blanket. Appears stated age.   Attitude Cooperative   Orientation Oriented to person, place   Eye Contact Fair   Speech  Regular rate, rhythm, volume and tone   Language Normal   Psychomotor Behavior Normal   Mood Neutral   Affect Flat   Thought Process Intact, Linear   Associations Intact   Thought Content Patient is currently negative for suicide ideation, negative for plan or intent, able to contract no self harm and identify barriers to suicide.  Negative for obsessions, compulsions or psychosis.      Fund of Knowledge Average   Insight Improving   Judgement Improving   Attention Span & Concentration Slightly impaired   Recent & Remote Memory Deficient   Gait Normal        Labs   Labs reviewed.  No results found for this or any previous visit (from the past 24 hour(s)).       Impression   This is a 66 year old female with depression and alcohol use who will need to attend IOP at [facility] and her cognitive functioning will need to be assessed due to evident memory deficits.   Diagnoses   1. Major depression, recurrent, severe, without psychotic features.   2. Alcohol Use Disorder, severe.     Plan     1. Explained side effects, benefits, and complications of medications to the patient, Pt gave verbal consent.  2. Medication changes: None.  3. Discussed treatment plan with patient and team.  4. Projected length of stay: Pt to discharge today.  5. Neuropsych testing.    Attestation:   Patient has been seen and evaluated by me, Curtis Balderrama MD.    Patient ID:  Name: Melissa Jean Penrose  MRN: [id number]  Admission: 5/2/2017   YOB: 1950
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
## 2017-05-08 NOTE — PROGRESS NOTES
Paged by nursing regarding need to reconcile medications prior to discharge to [place] in the AM. See H&P by Dr. Schrader from 5/1/17. Pt last seen by Hospitalist, Dr. Suzanne Horne on 5/2/17 at which time she was discharged to mental health unit without medicine service following as she was deemed stable.     Fall with facial abrasions  Assessment: Abrasions on nose and forehead. Fall related to alcohol intoxication. Frequent falls of similar nature at home, escalating as of late.   Plan: No special dressing changes needed. Allow to heal uncovered.      Acute alcohol intoxication with withdrawal symptoms  Macrocytosis  Assessment: Recurrent problems with alcohol abuse for >16 years, worsening and patient wants to get help with sobriety.  correlates with alcohol abuse, nutritional deficiencies.   Plan: Defer to Psychiatry  Thiamine, folate, ascorbic acid multivitamin ordered at discharge; prescriptions signed      H/o TBI  Short term memory loss since concussion 2016  Chronic headaches  Migraine headache history  Assessment: Noted. Followed at TBI clinic at [facility]. May have worsening memory problems given both parents developed dementia. No symptoms migraine at this time.   Plan: continue topamax and imitrex at discharge; prescription signed at discharge   prn acetaminophen, prescription signed at discharge   Outpatient follow-up yearly at [facility].     Depression, Anxiety  Assessment: longstanding symptoms but never formally diagnosed.  Plan: start mirtazapine per psych recs; defer prescription for this to Psych at discharge      Hypertension  Assessment:  Plan: continue losartan 100 mg daily, metoprolol 25 mg BID; prescriptions signed at discharge      Asthma history  Assessment: No symptoms at present.  Plan: PTA Singulair, Azelastine nasal spray, Nasonex nasal spray; prescriptions signed at discharge     GERD  Assessment: Stable   Plan: Continue PTA omeprazole daily; prescription signed at  discharge    Menopause  Hormonal replacement: Pt on Femhrt 1/5 PTA. No prescription at discharge as not on formulary and would require prior auth. Need to establish care with PCP in outpatient setting for future prescriptions. Defer further management to PCP
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
## 2017-05-08 NOTE — PLAN OF CARE
"Problem: Depressive Symptoms  Goal: Depressive Symptoms  Signs and symptoms of listed problems will be absent or manageable.   Pt spent most of the shift in the lounge. Pt walks around independently. Irritable in mood. Understands she needs to go to a TCU , but upset about it. Got irritated when writer reminded pts during wrap up group to get their night time meds. Pt said \"I wish you would just bring them to us\". When reminded that there are too many pts to do that, pt argued with writer. Eventually walked down to the window. When another pt wanted to watch a [employer] movie, pt argued, stated \"let him watch it at 10:30\" and continued making rude comments.       "

## 2017-05-09 VITALS
HEART RATE: 90 BPM | RESPIRATION RATE: 16 BRPM | TEMPERATURE: 97.9 F | DIASTOLIC BLOOD PRESSURE: 69 MMHG | SYSTOLIC BLOOD PRESSURE: 111 MMHG

## 2017-05-09 PROCEDURE — 25000132 ZZH RX MED GY IP 250 OP 250 PS 637: Mod: GY | Performed by: PSYCHIATRY & NEUROLOGY

## 2017-05-09 PROCEDURE — A9270 NON-COVERED ITEM OR SERVICE: HCPCS | Mod: GY | Performed by: PSYCHIATRY & NEUROLOGY

## 2017-05-09 RX ORDER — MIRTAZAPINE 15 MG/1
15 TABLET, FILM COATED ORAL AT BEDTIME
Qty: 30 TABLET | Refills: 0 | Status: SHIPPED | OUTPATIENT
Start: 2017-05-09 | End: 2018-01-03

## 2017-05-09 RX ADMIN — METOPROLOL TARTRATE 25 MG: 25 TABLET, FILM COATED ORAL at 08:44

## 2017-05-09 RX ADMIN — FOLIC ACID 1 MG: 1 TABLET ORAL at 08:45

## 2017-05-09 RX ADMIN — AZELASTINE HYDROCHLORIDE 2 SPRAY: 137 SPRAY, METERED NASAL at 08:46

## 2017-05-09 RX ADMIN — TOPIRAMATE 25 MG: 25 TABLET, FILM COATED ORAL at 08:47

## 2017-05-09 RX ADMIN — NORETHINDRONE ACETATE AND ETHINYL ESTRADIOL 1 TABLET: 1; 5 TABLET ORAL at 08:44

## 2017-05-09 RX ADMIN — LOSARTAN POTASSIUM 100 MG: 100 TABLET, FILM COATED ORAL at 08:44

## 2017-05-09 RX ADMIN — Medication 100 MG: at 08:45

## 2017-05-09 NOTE — PROGRESS NOTES
Clinic Care Coordination Contact  Clinic Care Coordination Chart Review     Situation: Patient chart reviewed by care coordinator.    Background: Patient was admitted to in behavioral health unit on 05/02/2017  Neuropsychic testing to be compelted 05/09/2017    Assessment: Patient is scheduled to d/c on 05/09/2017 to [facility]    Plan/Recommendations: Clinic Care Coordinator, [place] to follow up with POA following patient's d/c    DIMAS Gonsales, [facility]  Clinic Care Coordinator, [place] with  Fiona [facility]ud[facility]  [phone]
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
## 2017-05-09 NOTE — PROGRESS NOTES
Discharge paperwork completed and hand-signed by physician, then faxed to [facility]. Call also placed to [facility] to inform of incoming fax.    Call placed to Reena Geller, Health care POJONO. Reena will pick patient up for discharge to facility between 12:30 pm and 1 pm. [facility] as then informed of discharge time.

## 2017-05-09 NOTE — DISCHARGE INSTRUCTIONS
Behavioral Discharge Planning and Instructions    Summary:  Admitted to hospital after fall at home while intoxicated    Main Diagnosis:  Major Depression, recurrent, severe, without psychotic features; Alcohol Use Disorder, severe.    Major Treatments, Procedures and Findings: Psychiatric assessment.     Symptoms to Report: Increased confusion; Losing more sleep, Mood getting worse or Thoughts of suicide    Lifestyle Adjustment: Follow all treatment recommendations. Develop and follow safety plan. Abstain from all use of alcohol and maintain sobriety by going to chemical dependency treatment or attending AA meetings and obtaining a sponsor.     Psychiatry Follow-up:      Admission to:  [address] [address]  [phone] fax: [phone]    Dr. Balderrama  - patient can call for appointment if she wishes to follow with Dr. Balderrama.      [facility]  [address] [facility], [address]  [place]  [address]  Phone:  [phone]  Fax:  [phone]    Primary care: Dr. Qamar Zavala  [facility]  [address] [place] [address] Dr. Fiona Parra [place]   [phone] fax: [phone]    Resources:   Crisis Intervention: 567.953.9718 or 273-188-5645 (TTY: 554.366.3945).  Call anytime for help.  National [place] on Mental Illness (www.[place].arielle.org): 779.559.2923 or 794-277-9055.  Alcoholics Anonymous (www.alcoholics-anonymous.org): Check your phone book for your local chapter.  National Suicide Prevention Line (www.mentalhealthmn.org): 803-843-VCDT (0267)  Mental Health Association of [place] (www.mentalhealth.org): 403.981.7822 or 602-649-4207  COPE (Crisis Services for Adults) in [facility]: [phone] (Available 24/7)    General Medication Instructions:   See your medication sheet(s) for instructions.   Take all medicines as directed.  Make no changes unless your doctor suggests them.   Go to all your doctor visits.  Be sure to have all your  required lab tests. This way, your medicines can be refilled on time.  Do not use any drugs not prescribed by your doctor.  Avoid alcohol.

## 2017-05-09 NOTE — PROGRESS NOTES
"Discharge teaching done with pt and pt's POA and friend Jackie. Pt denied SI, and said, \"I have never been suicidal.\"  Pt verbalized understanding of medication and out pt f/u TX plan. Belongings returned to pt at discharge. Medication orders and discharge papers sent with pt to [facility]. Jackie is providing pt with transportation to [place]. Pt discharged to [place].  "

## 2017-05-09 NOTE — PROGRESS NOTES
"[facility] Psychiatric Progress Note       Interim History   The patient's care was discussed with the treatment team and chart notes were reviewed. Pt seen on SDU. Tolerating medications without side effects. Side effects, risks, and benefits of medications reviewed with patient. Pt is stabilizing on current medications. Discharged was deferred yesterday as [facility] requested additional paperwork to be completed. Neuropsych testing completed yesterday, pt was marginally cooperative and initial impressions were \"Mild to moderate neurocognitive disorder. Question if alcohol vs. Neurodegenerative in nature.\" She will discharge to [facility] today. Pt does not endorse any significant issues at this time. Denies suicidal or homicidal ideation.    Medications     Current Facility-Administered Medications:    [START ON 5/10/2017] - Skin Test Reading (tuberculin) -   Does not apply Once     mirtazapine (REMERON) tablet 15 mg  15 mg Oral At Bedtime     azelastine  2 spray Both Nostrils QAM     folic acid  1 mg Oral Daily     losartan  100 mg Oral Daily     metoprolol (LOPRESSOR) tablet 25 mg  25 mg Oral BID     fluticasone  2 spray Both Nostrils At Bedtime     montelukast (SINGULAIR) tablet 10 mg  10 mg Oral At Bedtime     multivitamin, therapeutic  1 tablet Oral At Bedtime     norethindrone-ethinyl estradiol  1 tablet Oral Daily     thiamine  100 mg Oral Daily     topiramate (TOPAMAX) tablet 25 mg  25 mg Oral BID     PRNs:  acetaminophen, bacitracin, celecoxib, omeprazole (priLOSEC) CR capsule 20 mg, SUMAtriptan      Allergies      Allergies   Allergen Reactions     Nuts [Peanut-Derived] Nausea and Vomiting     Amoxicillin      Morphine         Medical Review of Systems   /69  Pulse 90  Temp 97.9  F (36.6  C) (Oral)  Resp 16  There is no height or weight on file to calculate BMI.  A 10-point review of systems was performed by Dr. Balderrama and is negative, no new findings.      Psychiatric " Examination     Appearance Sitting in chair, dressed in scrubs, draped with blanket. Appears stated age.   Attitude Cooperative   Orientation Oriented to person, place   Eye Contact Fair   Speech Regular rate, rhythm, volume and tone   Language Normal   Psychomotor Behavior Normal   Mood Neutral   Affect Flat   Thought Process Intact, Linear   Associations Intact   Thought Content Patient is currently negative for suicide ideation, negative for plan or intent, able to contract no self harm and identify barriers to suicide.  Negative for obsessions, compulsions or psychosis.      Fund of Knowledge Average   Insight Improving   Judgement Fair   Attention Span & Concentration Slightly impaired   Recent & Remote Memory Deficient   Gait Normal        Labs   Labs reviewed.  No results found for this or any previous visit (from the past 24 hour(s)).       Impression   This is a 66 year old female with depression and alcohol use who will need to attend IOP at [facility] and her cognitive functioning will need to be assessed due to evident memory deficits.   Diagnoses   1. Major depression, recurrent, severe, without psychotic features.   2. Alcohol Use Disorder, severe.     Plan     1. Explained side effects, benefits, and complications of medications to the patient, Pt gave verbal consent.  2. Medication changes: None.  3. Discussed treatment plan with patient and team.  4. Projected length of stay: Pt to discharge today.    Attestation:   Patient has been seen and evaluated by me, Curtis Balderrama MD.    Patient ID:  Name: Melissa Jean Penrose  MRN: [id number]  Admission: 5/2/2017   YOB: 1950

## 2017-05-10 ENCOUNTER — TELEPHONE (OUTPATIENT)
Dept: FAMILY MEDICINE | Facility: CLINIC | Age: 67
End: 2017-05-10

## 2017-05-10 NOTE — TELEPHONE ENCOUNTER
Pt was seen at [facility] on Tue 5/9/2017.  Reason for visit: Severe Recurrent Major Depression Without Psychotic Features (      Thank you!

## 2017-05-10 NOTE — TELEPHONE ENCOUNTER
ED / Discharge Outreach Protocol    Patient Contact    Attempt # 1    Was call answered?  No.  Left message on voicemail with information to call me back.  Rachel Garvey RN

## 2017-05-10 NOTE — CONSULTS
NEUROPSYCHOLOGICAL CONSULTATION      ATTENDING PHYSICIAN:  Curtis Balderrama MD      CONSULTING PHYSICIAN:  Harvey Sandoval, PhD,        IDENTIFYING INFORMATION AND REASON FOR EVALUATION:  Melissa Penrose is a 66-year-old, right-handed  female admitted after a fall from which she was amnestic.  She has a history of alcohol abuse and cognitive decline.  Blood alcohol level on admit was minimal at 0.03.  CT scan of brain revealed generalized atrophy, but no acute intracranial trauma.      Dr. Balderrama referred the patient for neuropsychological consultation.  Patient was interviewed and completed neuropsychometric testing which included the Alvaro-Osterrieth Complex Figure Test, Wechsler Memory Scale/Logical Memory subtest, draw a clock test, Mini-Mental status exam, Oral Word Fluency Test, [place] Naming Test and Shonda-2.      On examination, patient was alert and oriented except for exact day of month.  Speech was fluent.  Affect was irritable with poor frustration tolerance and patient ultimately would not complete full neuropsychometric testing.  Clinically, she was observed to display elements of confusion on the unit such as not knowing which direction her room was in and what time meals were served.  Minimal cooperation was obtained.  Evaluation with incomplete, although results were interpreted as valid.      Past medical history reported positive for arthritis and hypertension, controlled with medication.  Patient has no known psychiatric history premorbidly, but there is a reported history of alcohol abuse.      Family neurologic history positive for mother suffered from Alzheimer's, onset at age 58.  Father having Alzheimer's, onset about age 82, but dying shortly after.      [place].  She completed an undergraduate degree from [employer] and then an LORETTA from [employer].  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  She has no children of her own.  She is  and her  had children from a prior marriage, but they are not close with the patient.  Plan for discharge from the hospital is to a transitional care unit.      General testing on the Mini-Mental status examination yielded a raw score of 25/30 points.  She was off in terms of date of month and did not complete serial sevens reversed, although she was accurate from 100 down to 72.  After registering 3/3 unrelated words in 1 trial, she could only recall 2.  Overall performance is raw score 27/30 points, which was at the cutoff range for clinically significant given her age and education.      Short-term memory functions were assessed as follows:  On the Logical Memory subtest of the WAIS-IV, her initial registration of 2 short paragraph-length stories presented orally was lower-end average, falling about 25th percentile.  When reassessed 30 minutes later, performance was in the mentally impaired range, falling at about 2nd percentile.  Recall of Alvaro-Osterrieth Complex Figure Test, a moderately complex design figure previously copied immediately after presentation, was mentally defective.  Further testing with that measure could not be completed.      Verbal abilities assessed as follows:  On the Shonda-2, vocabulary performance was upper-end average, falling at 63rd percentile.  The patient obviously is a bright individual premorbidly.  In contrast, sequential abstract reasoning was poor, falling borderline range at 8th percentile of standard score of 79.  Oral word fluency assessed on a semantic word generation task revealed patient able to generate 7 words to a target stimulus category, performance falling in the defective range at or below 2nd percentile.  [place] Naming Test performance was borderline range at 9th percentile, falling below 10th percentile for  age and education, reference norms at a raw score of 51/60 points.      Perceptual motor and executive functions assessed as follows:  Draw a clock test was intact except the patient did not correctly differentiate the length of the hands on the clock.  Alvaro-Osterrieth Complex Figure Design Copy performance was overall gauged grossly intact, falling a raw score of 31/60 points.  Intersecting pentagrams were drawn accurately on the Mini-Mental status exam.      IMPRESSIONS:  Mild to moderate neurocognitive disorder, etiology unclear, rule out alcohol related, rule out vascular, rule out early stage emerging dementia.      Cognitive difficulties are evident on my evaluation with particular impairments to short-term memory, verbal fluency, verbal abstractive abilities and confrontation naming.  Pattern of concern, particularly given history of Alzheimer's present in both of her parents.  The patient was quite irritable and at times frankly labile in terms of her emotions with my evaluation.  There is a suspected history of alcohol abuse as well.      DIAGNOSTIC HYPOTHESES:  Mild to moderate neurocognitive disorder, etiology unclear, rule out Alzheimer's type dementia emerging, rule out vascular etiology, rule out alcohol etiology, rule out mixed etiology.      Based on my evaluation and support, referral to transitional care unit.  Patient was apparently living independently premorbidly and reportedly functional with ADLs.  Have concerns regarding her ability to return to such and would recommend further neuropsychometric testing, as well as occupational therapy, assessment of functional ADLs and driving evaluation prior to considering resumption of functional independence.      Abstinence from alcohol is recommended.  If this proves problematic given cognitive difficulties I observed to my evaluation, I would recommend that she go through chemical dependency intervention in a more behaviorally-focused program such as  the [facility] unless considerable resolution of cognitive difficulties is observed subsequently.      Thank you for this kind referral.         HARVEY SANDOVAL, PHD, LP             D: 05/10/2017 11:00   T: 05/10/2017 11:41   MT: DEAN      Name:     PENROSE, MELISSA   MRN:      [id number]        Account:       [id number]   :      1950           Consult Date:  2017      Document: [id number]       cc: [facility] Sandoval PhD, LARON Balderrama MD

## 2017-05-10 NOTE — TELEPHONE ENCOUNTER
We received a notice regarding coverage for [employer] not being covered by pts insurance plan.  Estrace vag crm is preferred.   Prescribing MD on form state Dr Curtis Balderrama. Please forward to him.  Thanks. Forms placed in your basket.  Dirk MILTON CMA

## 2017-05-11 ENCOUNTER — TELEPHONE (OUTPATIENT)
Dept: FAMILY MEDICINE | Facility: CLINIC | Age: 67
End: 2017-05-11

## 2017-05-11 ENCOUNTER — CARE COORDINATION (OUTPATIENT)
Dept: CARE COORDINATION | Facility: CLINIC | Age: 67
End: 2017-05-11

## 2017-05-11 NOTE — TELEPHONE ENCOUNTER
[facility] called and they have made 2 attempts to contact the pt to schedule an abdominal us with no call back to schedule. Please advise.   She Jamison,

## 2017-05-11 NOTE — TELEPHONE ENCOUNTER
Called and spoke with Reena (caregiver) and she will call [facility] to schedule. Also an FYI - pt has moved into an assisted living center.  She Jamison,

## 2017-05-11 NOTE — PROGRESS NOTES
Clinic Care Coordination Contact  Care Team Conversations    Clinic Care Coordination Contact  Care Team Conversations        Clinical Data: call with patient's POA  1) patient went directly from hospital to [facility] on 05/09/2017  2) patient is on a locked unit; patient has been told that she is unsafe at home so needs to be at [facility]; POA is working on moving things from [facility]'s home into the apartment  3) patient at this point is not asking for a drink; POA is aware that if patient would want to continue to drink to consider patient getting CD treatment from [facility]; discussion that perhaps drinking is tied to patient being alone at home & when she is around others she is not feeling the desire to drink  4) at time of d/c, recommendation was for OT to continue to look at cognitive function, [facility] does have its own home care onstie; POA will check once patient is stilled in at [facility]; also, POA will request ST as [facility] had recommended that  5) patient will be seen by providers on site at [facility] but POA does want patient to continue with  providers as well        Plan:      POA can outreach to Clinic Care Coordinator, SW as needed  DIMAS Gonsales, [facility]  Clinic Care Coordinator, SW with [facility]  [phone]

## 2017-05-11 NOTE — TELEPHONE ENCOUNTER
Patient has had multiple ER visits- routing to care coordinators.    Mohini Amin,RN  [facility]  [phone]

## 2017-05-11 NOTE — TELEPHONE ENCOUNTER
See care coordination encounter for 05/11/2017  DIMAS Gonsales, [facility]  Clinic Care Coordinator,  with [facility]  [phone]

## 2017-05-24 ENCOUNTER — TELEPHONE (OUTPATIENT)
Dept: FAMILY MEDICINE | Facility: CLINIC | Age: 67
End: 2017-05-24

## 2017-05-24 NOTE — TELEPHONE ENCOUNTER
Left message asking nurse to return call.   Mariel Richardson RN   [facility] - Triage

## 2017-05-24 NOTE — TELEPHONE ENCOUNTER
Reason for Call:  Home Health Care    Homecare called regarding (reason for call): NEEDS ORDERS    Orders are needed for this patient. PLS CALL [facility] [phone]    PT: NICOLLE    OT: NICOLLE    Skilled Nursing: NICOLLE    Pt Provider: MILAD    Phone Number Homecare Nurse can be reached at: [facility] [phone]    Can we leave a detailed message on this number? YES      Phone number patient can be reached at   NA    Best Time: anytime    Call taken on 5/24/2017 at 9:14 AM by Lester Davies

## 2017-05-24 NOTE — TELEPHONE ENCOUNTER
Spoke with [facility]. Requesting orders for SN, PT, OT, SW and HHA for abnormal gait, cognitive issues, safety issues and med management. Verbal given per [facility] protocol.   Mariel Richardson RN   [facility] - Triage

## 2017-06-02 ENCOUNTER — CARE COORDINATION (OUTPATIENT)
Dept: CARE COORDINATION | Facility: CLINIC | Age: 67
End: 2017-06-02

## 2017-06-02 NOTE — PROGRESS NOTES
Clinic Care Coordination Contact  Care Team Conversations  Clinic Care Coordination Contact  Care Team Conversations        Clinical Data: Patient is in memory care at [facility] with home care services from [facility] of RN, PT, OT & SW  Patient does ask an assistant of POA who runs errands to get her alcohol. The assistant says that she cannot do that. Otherwise is not asking for alcohol from POA.  Patient remains in memory care which is primary patients in 80s. The question is can patient be in [facility] even with her memory issues. Patient has asked about going home but no plan for that.  Patient is improving physically & walking with cane  Patient is scheduled for [facility] on 06/06/2017      Plan:      POA can follow up with Clinic Care Coordinator, SW as needed  DIMAS Gonsales, [facility]  Clinic Care Coordinator, SW with  [facility]iona Sullivan [facility]  [phone]

## 2017-06-06 ENCOUNTER — OFFICE VISIT (OUTPATIENT)
Dept: FAMILY MEDICINE | Facility: CLINIC | Age: 67
End: 2017-06-06
Payer: COMMERCIAL

## 2017-06-06 ENCOUNTER — TRANSFERRED RECORDS (OUTPATIENT)
Dept: HEALTH INFORMATION MANAGEMENT | Facility: CLINIC | Age: 67
End: 2017-06-06

## 2017-06-06 VITALS
OXYGEN SATURATION: 97 % | HEIGHT: 71 IN | SYSTOLIC BLOOD PRESSURE: 91 MMHG | RESPIRATION RATE: 14 BRPM | DIASTOLIC BLOOD PRESSURE: 57 MMHG | TEMPERATURE: 97.7 F | HEART RATE: 71 BPM

## 2017-06-06 DIAGNOSIS — R74.8 ELEVATED LIVER ENZYMES: ICD-10-CM

## 2017-06-06 DIAGNOSIS — K76.0 HEPATIC STEATOSIS: ICD-10-CM

## 2017-06-06 DIAGNOSIS — Z53.9 DIAGNOSIS NOT YET DEFINED: Primary | ICD-10-CM

## 2017-06-06 DIAGNOSIS — F10.10 ALCOHOL ABUSE: ICD-10-CM

## 2017-06-06 DIAGNOSIS — Z12.31 VISIT FOR SCREENING MAMMOGRAM: Primary | ICD-10-CM

## 2017-06-06 DIAGNOSIS — S06.9X9S TRAUMATIC BRAIN INJURY, WITH LOSS OF CONSCIOUSNESS OF UNSPECIFIED DURATION, SEQUELA: ICD-10-CM

## 2017-06-06 PROCEDURE — G0180 MD CERTIFICATION HHA PATIENT: HCPCS | Performed by: FAMILY MEDICINE

## 2017-06-06 PROCEDURE — 99214 OFFICE O/P EST MOD 30 MIN: CPT | Performed by: PHYSICIAN ASSISTANT

## 2017-06-06 NOTE — NURSING NOTE
"Chief Complaint   Patient presents with     Recheck Medication       Initial BP 91/57 (BP Location: Right arm, Patient Position: Chair, Cuff Size: Adult Regular)  Pulse 71  Temp 97.7  F (36.5  C) (Oral)  Resp 14  Ht 5' 11.25\" (1.81 m)  SpO2 97% Estimated body mass index is 31.58 kg/(m^2) as calculated from the following:    Height as of 5/1/17: 5' 11\" (1.803 m).    Weight as of 5/2/17: 226 lb 6.6 oz (102.7 kg).  Medication Reconciliation: complete    Rebecca Harris MA  "

## 2017-06-06 NOTE — PROGRESS NOTES
SUBJECTIVE:                                                    Melissa Jean Penrose is a 66 year old female who presents to clinic today for the following health issues:      Medication Followup    Taking Medication as prescribed: yes    Side Effects:  None    Medication Helping Symptoms:  yes     Suzanne  Has hx of ETOH abuse and TBI with some resulting memory loss, she was recently admitted to the hospital after a fall believed to have happened while patient was intoxicated.  During her ER visit, patient was deemed to be unsafe to live alone and she was admitted, subsequently discharged to memory care at [facility] in [place] where she is currently living.  Today, she needs to have a medication review and forms singed for [facility] to administer her medications. She notes that she has been sober x 1 month, is doing very well physically in memory care, eating regular meals and performing her ADLs but she feels that it is very lonely in memory care as she is the youngest person there. She would like to step down to assisted living.    Abdominal US:  Suzanne has been having elevated transaminases, likely secondary to long term ETOH abuse.  US recently done for further investigation and shows hepatic steatosis.  She would like to review these findings today.       subsequently Problem list and histories reviewed & adjusted, as indicated.      Additional history: as documented    Patient Active Problem List   Diagnosis     Migraine without aura and without status migrainosus, not intractable     Short-term memory loss     Traumatic brain injury, with loss of consciousness of unspecified duration, sequela (H)     Hypertension goal BP (blood pressure) < 140/80     Alcohol abuse     Frequent falls     Pulmonary nodules     ACP (advance care planning)     Fall     Depression     Elevated liver enzymes     Past Surgical History:   Procedure Laterality Date     ORTHOPEDIC SURGERY       PHACOEMULSIFICATION CLEAR CORNEA WITH  STANDARD INTRAOCULAR LENS IMPLANT Right 2017    Procedure: PHACOEMULSIFICATION CLEAR CORNEA WITH STANDARD INTRAOCULAR LENS IMPLANT;  Surgeon: Brianna Christy MD;  Location: [facility]       Social History   Substance Use Topics     Smoking status: Former Smoker     Quit date: 10/23/1985     Smokeless tobacco: Never Used     Alcohol use 1.2 oz/week     2 Shots of liquor per week      Comment: 2 drinks a night     Family History   Problem Relation Age of Onset     Alzheimer Disease Mother 58      71         Current Outpatient Prescriptions   Medication Sig Dispense Refill     mirtazapine (REMERON) 15 MG tablet Take 1 tablet (15 mg) by mouth At Bedtime 30 tablet 0     losartan (COZAAR) 100 MG tablet Take 1 tablet (100 mg) by mouth daily 30 tablet 0     metoprolol (LOPRESSOR) 25 MG tablet Take 1 tablet (25 mg) by mouth 2 times daily 60 tablet 0     acetaminophen (TYLENOL) 325 MG tablet Take 2 tablets (650 mg) by mouth every 6 hours as needed for mild pain 100 tablet 0     montelukast (SINGULAIR) 10 MG tablet Take 1 tablet (10 mg) by mouth At Bedtime 30 tablet 0     topiramate (TOPAMAX) 25 MG tablet Take 1 tablet (25 mg) by mouth 2 times daily 60 tablet 0     azelastine (ASTELIN) 0.1 % spray Spray 2 sprays into both nostrils every morning 1 Bottle 0     mometasone (NASONEX) 50 MCG/ACT spray Spray 2 sprays into both nostrils At Bedtime 17 g 0     thiamine 100 MG tablet Take 1 tablet (100 mg) by mouth daily 30 tablet 0     Ascorbic Acid (VITAMIN C) 500 MG CHEW Take 500 mg by mouth At Bedtime 30 tablet 0     omeprazole 20 MG tablet Take 1 tablet (20 mg) by mouth daily 30 tablet 0     folic acid (FOLVITE) 1 MG tablet Take 1 tablet (1 mg) by mouth daily 30 tablet 0     SUMAtriptan (IMITREX) 100 MG tablet Take 1 tablet (100 mg) by mouth at onset of headache (Take every 2 hours as needed. Max 200mg in 24 hours.) Reported on 2017 30 tablet 0     Allergies   Allergen Reactions     Penicillins Swelling     Reports  "facial swelling, tolerated ceftriaxone per Care Everywhere in 10/2014     Tree Nuts  [Nuts] Other (See Comments)     Vomiting, throat closing, digestive issues     Nuts [Peanut-Derived] Nausea and Vomiting     Amoxicillin      Morphine        Reviewed and updated as needed this visit by clinical staff       Reviewed and updated as needed this visit by Provider         ROS:  Constitutional, HEENT, cardiovascular, pulmonary, GI, , musculoskeletal, neuro, skin, endocrine and psych systems are negative, except as otherwise noted.    OBJECTIVE:                                                    BP 91/57 (BP Location: Right arm, Patient Position: Chair, Cuff Size: Adult Regular)  Pulse 71  Temp 97.7  F (36.5  C) (Oral)  Resp 14  Ht 5' 11.25\" (1.81 m)  SpO2 97%  There is no height or weight on file to calculate BMI.  GENERAL: healthy, alert and no distress  EYES: Eyes grossly normal to inspection  RESP: lungs clear to auscultation - no rales, rhonchi or wheezes  CV: regular rate and rhythm, normal S1 S2, no S3 or S4, no murmur  PSYCH: mentation appears normal, inattentive, affect flat, judgement and insight impaired and appearance well groomed    Diagnostic Test Results:  none      ASSESSMENT/PLAN:                                                        1. Traumatic brain injury, with loss of consciousness of unspecified duration, sequela (H)  Will contact [facility] to determine next steps for patient and coordinate her care, will also copy Care coordination on this note as they have been in contact with patient regarding her placement.  She will also follow up with her neurologist at [facility] ( Dr. Randle) to follow up on her TBI/memory issues.  She is doing very well at [facility] but is feeling lonely as she is the youngest patient.  I will call and see what the treatment plan will be for this patient moving forward.     2. Alcohol abuse  Sober now x 1 month, will continue to follow    3. Elevated liver enzymes  US " reviewed today, showing hepatic steatosis.  Discussed continuing sobriety to maintain liver function and avoid worsening.  She is understanding of this.      4. Hepatic steatosis  See above    5. Visit for screening mammogram  - MA SCREENING DIGITAL BILAT - Future  (s+30); Future    See Patient Instructions    Thuan Banda PA-C  [facility]

## 2017-06-06 NOTE — MR AVS SNAPSHOT
After Visit Summary   6/6/2017    Melissa Jean Penrose    MRN: [id number]           Patient Information     Date Of Birth          1950        Visit Information        Provider Department      6/6/2017 11:20 AM Thuan Banda PA-C [facility]        Today's Diagnoses     Visit for screening mammogram    -  1    Traumatic brain injury, with loss of consciousness of unspecified duration, sequela (H)        Alcohol abuse        Elevated liver enzymes        Hepatic steatosis           Follow-ups after your visit        Your next 10 appointments already scheduled     Jun 13, 2017 11:00 AM CDT   Office Visit with Thuan Banda PA-C   [facility] ([facility])    [address] [place] [id number] [phone]           Bring a current list of meds and any records pertaining to this visit.  For Physicals, please bring immunization records and any forms needing to be filled out.  Please arrive 10 minutes early to complete paperwork.              Who to contact     If you have questions or need follow up information about today's clinic visit or your schedule please contact [facility] directly at [phone].  Normal or non-critical lab and imaging results will be communicated to you by MyChart, letter or phone within 4 business days after the clinic has received the results. If you do not hear from us within 7 days, please contact the clinic through [employer] or phone. If you have a critical or abnormal lab result, we will notify you by phone as soon as possible.  Submit refill requests through [employer] or call your pharmacy and they will forward the refill request to us. Please allow 3 business days for your refill to be completed.          Additional Information About Your Visit        [employer] Information     [employer] gives you secure access to your electronic health record. If you see a primary  "care provider, you can also send messages to your care team and make appointments. If you have questions, please call your primary care clinic.  If you do not have a primary care provider, please call [phone] and they will assist you.        Care EveryWhere ID     This is your Care EveryWhere ID. This could be used by other organizations to access your [facility] medical records  [id number]        Your Vitals Were     Pulse Temperature Respirations Height Pulse Oximetry       71 97.7  F (36.5  C) (Oral) 14 5' 11.25\" (1.81 m) 97%        Blood Pressure from Last 3 Encounters:   06/06/17 91/57   05/09/17 111/69   05/02/17 117/71    Weight from Last 3 Encounters:   05/02/17 226 lb 6.6 oz (102.7 kg)   04/27/17 212 lb (96.2 kg)   02/07/17 235 lb (106.6 kg)               Primary Care Provider Office Phone # Fax #    Qamar Zavala [phone]       [facility] [address] [place] [address] [address]  [place] [address]        Thank you!     Thank you for choosing [facility]  for your care. Our goal is always to provide you with excellent care. Hearing back from our patients is one way we can continue to improve our services. Please take a few minutes to complete the written survey that you may receive in the mail after your visit with us. Thank you!             Your Updated Medication List - Protect others around you: Learn how to safely use, store and throw away your medicines at www.disposemymeds.org.          This list is accurate as of: 6/6/17 11:59 PM.  Always use your most recent med list.                   Brand Name Dispense Instructions for use    acetaminophen 325 MG tablet    TYLENOL    100 tablet    Take 2 tablets (650 mg) by mouth every 6 hours as needed for mild pain       azelastine 0.1 % spray    ASTELIN    1 Bottle    Spray 2 sprays into both nostrils every morning       folic acid 1 MG tablet    FOLVITE    30 tablet    Take 1 tablet (1 mg) by mouth daily       " losartan 100 MG tablet    COZAAR    30 tablet    Take 1 tablet (100 mg) by mouth daily       metoprolol 25 MG tablet    LOPRESSOR    60 tablet    Take 1 tablet (25 mg) by mouth 2 times daily       mirtazapine 15 MG tablet    REMERON    30 tablet    Take 1 tablet (15 mg) by mouth At Bedtime       mometasone 50 MCG/ACT spray    NASONEX    17 g    Spray 2 sprays into both nostrils At Bedtime       montelukast 10 MG tablet    SINGULAIR    30 tablet    Take 1 tablet (10 mg) by mouth At Bedtime       multivitamin, therapeutic Tabs tablet     30 tablet    Take 1 tablet by mouth At Bedtime       omeprazole 20 MG tablet     30 tablet    Take 1 tablet (20 mg) by mouth daily       SUMAtriptan 100 MG tablet    IMITREX    30 tablet    Take 1 tablet (100 mg) by mouth at onset of headache (Take every 2 hours as needed. Max 200mg in 24 hours.) Reported on 4/27/2017       thiamine 100 MG tablet     30 tablet    Take 1 tablet (100 mg) by mouth daily       topiramate 25 MG tablet    TOPAMAX    60 tablet    Take 1 tablet (25 mg) by mouth 2 times daily       vitamin C 500 MG Chew     30 tablet    Take 500 mg by mouth At Bedtime

## 2017-06-07 ENCOUNTER — TELEPHONE (OUTPATIENT)
Dept: FAMILY MEDICINE | Facility: CLINIC | Age: 67
End: 2017-06-07

## 2017-06-08 ENCOUNTER — TELEPHONE (OUTPATIENT)
Dept: FAMILY MEDICINE | Facility: CLINIC | Age: 67
End: 2017-06-08

## 2017-06-08 PROBLEM — R74.8 ELEVATED LIVER ENZYMES: Status: ACTIVE | Noted: 2017-06-08

## 2017-06-08 NOTE — TELEPHONE ENCOUNTER
Received call back from Gibran Snider RN at [facility] regarding patient.  She recently had cognitive testing done at [facility] confirming that memory care was prper placement for patient. Memory care staff are currently working with bouchra TA) to have Suzanne participate a program that would give her daily outings with nursing staff and help to help with social interaction.  Medication forms faxed to me and filled out.  Given to Delmy Stephenson to fax back to [facility].   Will continue to await updates on patient condition. Jossie Mahoney copied here as FYI

## 2017-06-09 ENCOUNTER — MEDICAL CORRESPONDENCE (OUTPATIENT)
Dept: HEALTH INFORMATION MANAGEMENT | Facility: CLINIC | Age: 67
End: 2017-06-09

## 2017-06-09 NOTE — DISCHARGE SUMMARY
DATE OF ADMISSION:  2017.       DATE OF DISCHARGE:  2017.       IDENTIFICATION:  Ms. Melissa Penrose is a 66-year-old   female living in [place].  She sees primary care Dr. Zavala.  No previous psychiatry assessments.  History of alcohol use disorder.  The patient was seen in consult by Dr. Richardson.  See dictation on 2017.         HOSPITAL COURSE:  The patient was stabilized and transferred to Psychiatry.  She was started on Remeron at 7.5 mg at night.  She tolerated without any side effects.  Remeron was increased to 15.  The patient's mood improved.  The patient had neuropsych testing.  She was marginally cooperative.  Came out as mild to moderate neurocognitive disorder.  The patient was then referred to nursing home and was ready for discharge.  She had a physical exam on Medicine by Dr. Schrader.  She was discharged by Dr. Roberta Horne.  The patient admitted initially with a fall with facial abrasions.  No new medical issues were addressed.  The patient was then ready for discharge.      DISCHARGE DIAGNOSES:   1.  Major depression, recurrent, severe without psychotic features.   2.  Alcohol use disorder, mild.    3.  Cognitive impairment.      DISCHARGE PLAN:  The patient will be discharged to [facility].      DISCHARGE MEDICATIONS:   1.  Remeron 15 mg at night.   2.  Losartan 100 mg a day.   3.  Metoprolol 25 mg a day.   4.  Singulair 10 mg at bedtime.   5.  Topamax 25 mg twice a day.   6.  Astelin spray.   7.  Omeprazole 20 mg a day.   8.  Imitrex p.r.n.         JULIA RICHARDSON MD             D: 2017 00:40   T: 2017 09:45   MT: DA      Name:     PENROSE, MELISSA   MRN:      [id number]        Account:        [id number]   :      1950           Admit Date:                                       Discharge Date: 2017      Document: [id number]       cc: Qamar Zavala MD

## 2017-06-15 ENCOUNTER — APPOINTMENT (OUTPATIENT)
Dept: GENERAL RADIOLOGY | Facility: CLINIC | Age: 67
End: 2017-06-15
Attending: EMERGENCY MEDICINE
Payer: MEDICARE

## 2017-06-15 ENCOUNTER — HOSPITAL ENCOUNTER (EMERGENCY)
Facility: CLINIC | Age: 67
Discharge: HOME | End: 2017-06-15
Attending: EMERGENCY MEDICINE | Admitting: EMERGENCY MEDICINE
Payer: MEDICARE

## 2017-06-15 VITALS
BODY MASS INDEX: 32.9 KG/M2 | WEIGHT: 235 LBS | RESPIRATION RATE: 16 BRPM | HEIGHT: 71 IN | SYSTOLIC BLOOD PRESSURE: 106 MMHG | TEMPERATURE: 97.7 F | OXYGEN SATURATION: 97 % | HEART RATE: 75 BPM | DIASTOLIC BLOOD PRESSURE: 67 MMHG

## 2017-06-15 DIAGNOSIS — M25.571 ACUTE RIGHT ANKLE PAIN: ICD-10-CM

## 2017-06-15 PROCEDURE — 73630 X-RAY EXAM OF FOOT: CPT | Mod: RT

## 2017-06-15 PROCEDURE — 73610 X-RAY EXAM OF ANKLE: CPT | Mod: RT

## 2017-06-15 PROCEDURE — 99284 EMERGENCY DEPT VISIT MOD MDM: CPT

## 2017-06-15 ASSESSMENT — ENCOUNTER SYMPTOMS
WEAKNESS: 0
JOINT SWELLING: 1
COLOR CHANGE: 1
NUMBNESS: 0
FEVER: 0

## 2017-06-15 NOTE — ED PROVIDER NOTES
"  History     Chief Complaint:  Ankle pain    HPI   Melissa Jean Penrose is a 66 year old female with a history of HTN, TBI, and dementia who presents to the emergency department for evaluation of a right ankle pain. Of note, the patient is currently residing in a memory care home following a TBI. Yesterday, the patient reports twisting her right ankle, but does not recall what happened specifically. She reports swelling and pain in the ankle and slight purple discoloration in the second toe. However, she reports being ambulatory with the assistance of a cane today. Her continued right ankle and foot pain prompted her ED visit today. She denies any recent fever, numbness, weakness, or sustaining any other injuries as a result of this incident yesterday. Of note, the patient is not taking blood thinners. Denies headache, neck pain, abdominal pain.    Allergies:  Penicillins  Tree nuts  Peanuts  Amoxicillin  Morphine    Medications:    Remeron  Losartan  Lopressor  Topamax  Astelin  Nasonex  omeprazole  Imitrex    Past Medical History:    Dementia  HTN  Substance abuse  TBI  Alcohol abuse  Depression  Migraines  Pulmonary nodules    Past Surgical History:    Orthopedic surgery  Intraocular lens implant    Family History:    Alzheimer Disease     Social History:  Presents with her friend, Mark.   Former Smoker  Alcohol use; 1.2 oz/week   Marital Status:   [5]     Review of Systems   Constitutional: Negative for fever.   Musculoskeletal: Positive for joint swelling.        Positive for right ankle pain.    Skin: Positive for color change.   Neurological: Negative for weakness and numbness.   All other systems reviewed and are negative.    Physical Exam   First Vitals:  BP: 106/67  Pulse: 75  Temp: 97.7  F (36.5  C)  Resp: 16  Height: 180.3 cm (5' 11\")  Weight: 106.6 kg (235 lb)  SpO2: 97 %      Physical Exam  General: Resting comfortably on the gurney  Eyes:  The pupils are equal and round  ENT:    No head " trauma  Neck:  Normal range of motion  CV:  Regular rate and rhythm    Skin warm and well perfused     DP/PT pulses 2+ bilaterally  Resp:  Lungs are clear    Non-labored    No rales    No wheezing  MS:  Mild swelling and tenderness over her right lateral malleolus.     Mild pain on palpation of right toes    Full range of motion of right ankle    Normal muscular tone  Skin:  Mild erythema and ecchymosis over the lateral aspect of her right foot.  Neuro:   Awake, alert.      Speech is normal and fluent.    Face is symmetric.     Moves all extremities equally    SILT on bilateral LE  Psych:  Normal affect.  Appropriate interactions.    Emergency Department Course     Imaging:  Radiographic findings were communicated with the patient who voiced understanding of the findings.    XR Ankle, G/E 3 views, right:   No acute osseous abnormality. As per radiology.     XR Foot, G/E 3 views, right:   No acute osseous abnormality. As per radiology.     Emergency Department Course:  Nursing notes and vitals reviewed. I performed an exam of the patient as documented above.     The patient was sent for foot and ankle XR while in the emergency department, findings above.     1650 I rechecked the patient and discussed the results of their workup thus far.     Findings and plan explained to the Patient. Patient discharged home with instructions regarding supportive care, medications, and reasons to return. The importance of close follow-up was reviewed.     I personally reviewed the laboratory results with the Patient and answered all related questions prior to discharge.     Impression & Plan    Medical Decision Making:  Melissa Jean Penrose is a 66 year old female who presented to the emergency department with ankle pain. Her vital signs were normal. It does appear from her chart that she does have short term memory issues and so patient cannot recall if she twisted her ankle or what happened to cause the pain. Her friend thinks that  she hit her foot on something and then twisted her foot. There is mild swelling over her right lateral malleolus. There is erythema/ecchymosis over the lateral aspect of her right foot. She is able to move her right ankle with no pain and unlikely septic arthritis. Her x-rays were unremarkable. She probably twisted and sprained her ankle. I recommended ice and elevation. She is able to ambulate. Considered infection but this seems less likely given history/exam. Patient does not want to be on any antibiotics. Recommended that staff at her facility monitor the swelling/ecchymosis/erythema. If worsening redness, may need treatment for cellulitis though at this time likely ankle sprain. I discussed reasons to return to the emergency department with the patient.     Diagnosis:    ICD-10-CM   1. Acute right ankle pain M25.571       Disposition:  discharged to home  I, Natalie Schulte, am serving as a scribe on 6/15/2017 at 3:24 PM to personally document services performed by Maureen Santana MD, based on my observations and the provider's statements to me.     Natalie Schulte  6/15/2017    EMERGENCY DEPARTMENT       Maureen Santana MD  06/15/17 7032

## 2017-06-15 NOTE — ED AVS SNAPSHOT
Emergency Department    [address] [facility] [address]    Phone:  [phone]    Fax:  [phone]                                       Melissa Jean Penrose   MRN: [id number]    Department:   Emergency Department   Date of Visit:  6/15/2017           Patient Information     Date Of Birth          1950        Your diagnoses for this visit were:     Acute right ankle pain        You were seen by Maureen Santana MD.      Follow-up Information     Follow up with Qamar Zavala MD.    Specialty:  Family Practice    Contact information:    [facility]  [address] [place] [address] DR  [place] [id number] [phone]          Follow up with  Emergency Department.    Specialty:  EMERGENCY MEDICINE    Why:  If symptoms worsen    Contact information:    [address] [facility] [id number] [phone]        Discharge Instructions       Ice the right ankle and try to elevate the right ankle as much as possible  Ice the ankle 15-20 minutes at a time 3-4 times per day  Keep the ACE wrap on during the day and during evenings/night, take it off  Elevate the ankle above your heart  Have someone at the facility look at your ankle twice per day to make sure that it is improving and redness is not worsening    Discharge Instructions  Extremity Injury    You were seen today for an injury to an extremity (arm, hand, leg, or foot). You may have a bruise, strain, or fracture (broken bone).    Return to the Emergency Department or see your regular doctor if your injured area is not back to normal within 5-7 days.    Return to the Emergency Department right away if:    Your pain seems to change or get worse or there is pain in a new area.    Your extremity becomes pale, cool, blue, or numb or tingling past the injury.    You have more drainage, redness or pain in the area of the cut or abrasion.    You have pain that you can t control with the medicine recommended or  prescribed here, or you have pain that seems too much for your injury.    Your child will not stop crying or is much more fussy than normal.    You have new symptoms or anything that worries you.    What to Expect:    Your swelling and pain may be worse the day after your injury, but should not be severe and should start getting better after that. You should not have new symptoms and your pain should not get worse.    You may start to get a bruise over the injured area or below the injured area.    Your movement and strength should get better with time.    Some injuries may not show up until after you have left the Emergency Department so it is important to follow-up with your regular doctor.    Your injury may prevent you from working.  Follow-up with your regular doctor to get a work release note.    Pain medications or your injury may make it unsafe to drive or operate machinery.    Home Care:    Apply ice your injured area for 15 minutes at a time, at least 3 times a day. Use a cloth between the ice bag and your skin to prevent frostbite.     Do not sleep with an ice pack or heating pad on, since this can cause burns or skin injury.    Rest your injured area for at least 1-2 days. After that you may start using your extremity again as long as there is not too much pain.     Raise the injured area above the level of your heart as much as possible in the first 1-2 days.    Use Tylenol  (acetaminophen), Motrin (ibuprofen), or Advil  (ibuprofen) for your pain unless you have an allergy or are told not to use these medications by your doctor.  Take the medications as instructed on the package. Tylenol  (acetaminophen) is in many prescription medicines and non-prescription medicines--check all of your medicines to be sure you aren t taking more than 3000 mg per day.    You may use an elastic bandage (Ace  Wrap) if it makes you more comfortable. Wrap it just tight enough to provide light compression, like a new pair of  socks feels. Loosen the bandage if you have swelling past the bandage.      Please follow any other instructions that were discussed with you by your doctor.    MORE INFORMATION:    X-rays:  X-rays done today were read by your doctor but will also be read by a radiologist.  We will contact you if the radiologist sees anything different on the x-ray.  Your regular doctor may also want to review your x-rays on follow-up.    You could have a fracture (break), even if we told you your x-rays were normal. X-rays are not always certain, and some fractures are hard to see and may not show up right away.  Also, your x-ray may look like you have a fracture, even though you do not.  It is important to follow-up with your regular doctor.     Stretching:  If your injury was to your arm or shoulder and your doctor put you in a sling or an immobilizer, it is important that you take off your immobilizer within 3 days and stretch/move your shoulder, unless your doctor specifically tells you to not move your shoulder.  This is to prevent further injury such as a  frozen shoulder .     If you were given a prescription for medicine here today, be sure to read all of the information (including the package insert) that comes with your prescription.  This will include important information about the medicine, its side effects, and any warnings that you need to know about.  The pharmacist who fills the prescription can provide more information and answer questions you may have about the medicine.  If you have questions or concerns that the pharmacist cannot address, please call or return to the Emergency Department.     Remember that you can always come back to the Emergency Department if you are not able to see your regular doctor in the amount of time listed above, if you get any new symptoms, or if there is anything that worries you.        24 Hour Appointment Hotline       To make an appointment at any [facility], call  [phone] ([phone]). If you don't have a family doctor or clinic, we will help you find one. [place] clinics are conveniently located to serve the needs of you and your family.             Review of your medicines      Our records show that you are taking the medicines listed below. If these are incorrect, please call your family doctor or clinic.        Dose / Directions Last dose taken    acetaminophen 325 MG tablet   Commonly known as:  TYLENOL   Dose:  650 mg   Quantity:  100 tablet        Take 2 tablets (650 mg) by mouth every 6 hours as needed for mild pain   Refills:  0        azelastine 0.1 % spray   Commonly known as:  ASTELIN   Dose:  2 spray   Quantity:  1 Bottle        Spray 2 sprays into both nostrils every morning   Refills:  0        folic acid 1 MG tablet   Commonly known as:  FOLVITE   Dose:  1 mg   Quantity:  30 tablet        Take 1 tablet (1 mg) by mouth daily   Refills:  0        losartan 100 MG tablet   Commonly known as:  COZAAR   Dose:  100 mg   Quantity:  30 tablet        Take 1 tablet (100 mg) by mouth daily   Refills:  0        metoprolol 25 MG tablet   Commonly known as:  LOPRESSOR   Dose:  25 mg   Quantity:  60 tablet        Take 1 tablet (25 mg) by mouth 2 times daily   Refills:  0        mirtazapine 15 MG tablet   Commonly known as:  REMERON   Dose:  15 mg   Quantity:  30 tablet        Take 1 tablet (15 mg) by mouth At Bedtime   Refills:  0        mometasone 50 MCG/ACT spray   Commonly known as:  NASONEX   Dose:  2 spray   Quantity:  17 g        Spray 2 sprays into both nostrils At Bedtime   Refills:  0        montelukast 10 MG tablet   Commonly known as:  SINGULAIR   Dose:  10 mg   Quantity:  30 tablet        Take 1 tablet (10 mg) by mouth At Bedtime   Refills:  0        omeprazole 20 MG tablet   Dose:  20 mg   Quantity:  30 tablet        Take 1 tablet (20 mg) by mouth daily   Refills:  0        SUMAtriptan 100 MG tablet   Commonly known as:  IMITREX   Dose:  100 mg    Quantity:  30 tablet        Take 1 tablet (100 mg) by mouth at onset of headache (Take every 2 hours as needed. Max 200mg in 24 hours.) Reported on 4/27/2017   Refills:  0        thiamine 100 MG tablet   Dose:  100 mg   Quantity:  30 tablet        Take 1 tablet (100 mg) by mouth daily   Refills:  0        topiramate 25 MG tablet   Commonly known as:  TOPAMAX   Dose:  25 mg   Indication:  Migraine Headache   Quantity:  60 tablet        Take 1 tablet (25 mg) by mouth 2 times daily   Refills:  0        vitamin C 500 MG Chew   Dose:  500 mg   Quantity:  30 tablet        Take 500 mg by mouth At Bedtime   Refills:  0                Procedures and tests performed during your visit     Ankle XR, G/E 3 views, right    Foot  XR, G/E 3 views, right      Orders Needing Specimen Collection     None      Pending Results     No orders found from 6/13/2017 to 6/16/2017.            Pending Culture Results     No orders found from 6/13/2017 to 6/16/2017.            Pending Results Instructions     If you had any lab results that were not finalized at the time of your Discharge, you can call the ED Lab Result RN at [phone]. You will be contacted by this team for any positive Lab results or changes in treatment. The nurses are available 7 days a week from 10A to 6:30P.  You can leave a message 24 hours per day and they will return your call.        Test Results From Your Hospital Stay        6/15/2017  4:12 PM      Narrative     RIGHT ANKLE THREE OR MORE VIEWS   6/15/2017 3:32 PM     HISTORY: Pain after twisting ankle.    COMPARISON: None.    FINDINGS: Three views right ankle demonstrate moderate lateral soft  tissue swelling. Ankle mortise is intact. No fracture or dislocation.  Arterial calcifications and small plantar calcaneal spur noted.        Impression     IMPRESSION: No acute osseous abnormality.    DAKOTA JUAREZ MD         6/15/2017  4:12 PM      Narrative     RIGHT FOOT THREE OR MORE VIEWS   6/15/2017 3:32 PM      HISTORY: Pain after twisting ankle.    COMPARISON: None.    FINDINGS: Plantar calcaneal spur and arterial calcifications are  noted. Degenerative changes noted of the forefoot. No acute fracture  or dislocation.        Impression     IMPRESSION: No acute osseous abnormality.    DAKOTA JUAREZ MD                Clinical Quality Measure: Blood Pressure Screening     Your blood pressure was checked while you were in the emergency department today. The last reading we obtained was  BP: 106/67 . Please read the guidelines below about what these numbers mean and what you should do about them.  If your systolic blood pressure (the top number) is less than 120 and your diastolic blood pressure (the bottom number) is less than 80, then your blood pressure is normal. There is nothing more that you need to do about it.  If your systolic blood pressure (the top number) is 120-139 or your diastolic blood pressure (the bottom number) is 80-89, your blood pressure may be higher than it should be. You should have your blood pressure rechecked within a year by a primary care provider.  If your systolic blood pressure (the top number) is 140 or greater or your diastolic blood pressure (the bottom number) is 90 or greater, you may have high blood pressure. High blood pressure is treatable, but if left untreated over time it can put you at risk for heart attack, stroke, or kidney failure. You should have your blood pressure rechecked by a primary care provider within the next 4 weeks.  If your provider in the emergency department today gave you specific instructions to follow-up with your doctor or provider even sooner than that, you should follow that instruction and not wait for up to 4 weeks for your follow-up visit.        Thank you for choosing [facility]       Thank you for choosing [facility] for your care. Our goal is always to provide you with excellent care. Hearing back from our patients is one way we can continue to  improve our services. Please take a few minutes to complete the written survey that you may receive in the mail after you visit with us. Thank you!        [employer] Information     [employer] gives you secure access to your electronic health record. If you see a primary care provider, you can also send messages to your care team and make appointments. If you have questions, please call your primary care clinic.  If you do not have a primary care provider, please call [phone] and they will assist you.        Care EveryWhere ID     This is your Care EveryWhere ID. This could be used by other organizations to access your [facility] medical records  [id number]        After Visit Summary       This is your record. Keep this with you and show to your community pharmacist(s) and doctor(s) at your next visit.

## 2017-06-15 NOTE — ED AVS SNAPSHOT
Emergency Department    [address] [facility] [address]    Phone:  [phone]    Fax:  [phone]                                       Melissa Jean Penrose   MRN: [id number]    Department:   Emergency Department   Date of Visit:  6/15/2017           After Visit Summary Signature Page     I have received my discharge instructions, and my questions have been answered. I have discussed any challenges I see with this plan with the nurse or doctor.    ..........................................................................................................................................  Patient/Patient Representative Signature      ..........................................................................................................................................  Patient Representative Print Name and Relationship to Patient    ..................................................               ................................................  Date                                            Time    ..........................................................................................................................................  Reviewed by Signature/Title    ...................................................              ..............................................  Date                                                            Time

## 2017-06-15 NOTE — DISCHARGE INSTRUCTIONS
Ice the right ankle and try to elevate the right ankle as much as possible  Ice the ankle 15-20 minutes at a time 3-4 times per day  Keep the ACE wrap on during the day and during evenings/night, take it off  Elevate the ankle above your heart  Have someone at the facility look at your ankle twice per day to make sure that it is improving and redness is not worsening    Discharge Instructions  Extremity Injury    You were seen today for an injury to an extremity (arm, hand, leg, or foot). You may have a bruise, strain, or fracture (broken bone).    Return to the Emergency Department or see your regular doctor if your injured area is not back to normal within 5-7 days.    Return to the Emergency Department right away if:    Your pain seems to change or get worse or there is pain in a new area.    Your extremity becomes pale, cool, blue, or numb or tingling past the injury.    You have more drainage, redness or pain in the area of the cut or abrasion.    You have pain that you can t control with the medicine recommended or prescribed here, or you have pain that seems too much for your injury.    Your child will not stop crying or is much more fussy than normal.    You have new symptoms or anything that worries you.    What to Expect:    Your swelling and pain may be worse the day after your injury, but should not be severe and should start getting better after that. You should not have new symptoms and your pain should not get worse.    You may start to get a bruise over the injured area or below the injured area.    Your movement and strength should get better with time.    Some injuries may not show up until after you have left the Emergency Department so it is important to follow-up with your regular doctor.    Your injury may prevent you from working.  Follow-up with your regular doctor to get a work release note.    Pain medications or your injury may make it unsafe to drive or operate machinery.    Home  Care:    Apply ice your injured area for 15 minutes at a time, at least 3 times a day. Use a cloth between the ice bag and your skin to prevent frostbite.     Do not sleep with an ice pack or heating pad on, since this can cause burns or skin injury.    Rest your injured area for at least 1-2 days. After that you may start using your extremity again as long as there is not too much pain.     Raise the injured area above the level of your heart as much as possible in the first 1-2 days.    Use Tylenol  (acetaminophen), Motrin (ibuprofen), or Advil  (ibuprofen) for your pain unless you have an allergy or are told not to use these medications by your doctor.  Take the medications as instructed on the package. Tylenol  (acetaminophen) is in many prescription medicines and non-prescription medicines--check all of your medicines to be sure you aren t taking more than 3000 mg per day.    You may use an elastic bandage (Ace  Wrap) if it makes you more comfortable. Wrap it just tight enough to provide light compression, like a new pair of socks feels. Loosen the bandage if you have swelling past the bandage.      Please follow any other instructions that were discussed with you by your doctor.    MORE INFORMATION:    X-rays:  X-rays done today were read by your doctor but will also be read by a radiologist.  We will contact you if the radiologist sees anything different on the x-ray.  Your regular doctor may also want to review your x-rays on follow-up.    You could have a fracture (break), even if we told you your x-rays were normal. X-rays are not always certain, and some fractures are hard to see and may not show up right away.  Also, your x-ray may look like you have a fracture, even though you do not.  It is important to follow-up with your regular doctor.     Stretching:  If your injury was to your arm or shoulder and your doctor put you in a sling or an immobilizer, it is important that you take off your immobilizer  within 3 days and stretch/move your shoulder, unless your doctor specifically tells you to not move your shoulder.  This is to prevent further injury such as a  frozen shoulder .     If you were given a prescription for medicine here today, be sure to read all of the information (including the package insert) that comes with your prescription.  This will include important information about the medicine, its side effects, and any warnings that you need to know about.  The pharmacist who fills the prescription can provide more information and answer questions you may have about the medicine.  If you have questions or concerns that the pharmacist cannot address, please call or return to the Emergency Department.     Remember that you can always come back to the Emergency Department if you are not able to see your regular doctor in the amount of time listed above, if you get any new symptoms, or if there is anything that worries you.

## 2017-06-16 ENCOUNTER — CARE COORDINATION (OUTPATIENT)
Dept: CARE COORDINATION | Facility: CLINIC | Age: 67
End: 2017-06-16

## 2017-06-16 NOTE — PROGRESS NOTES
Clinic Care Coordination Contact  Care Team Conversations    Patient seen in ED yesterday 6/15/17.    Chief Complaint:  Ankle pain     HPI   Melissa Jean Penrose is a 66 year old female with a history of HTN, TBI, and dementia who presents to the emergency department for evaluation of a right ankle pain. Of note, the patient is currently residing in a memory care home following a TBI. Yesterday, the patient reports twisting her right ankle, but does not recall what happened specifically. She reports swelling and pain in the ankle and slight purple discoloration in the second toe. However, she reports being ambulatory with the assistance of a cane today. Her continued right ankle and foot pain prompted her ED visit today. She denies any recent fever, numbness, weakness, or sustaining any other injuries as a result of this incident yesterday. Of note, the patient is not taking blood thinners. Denies headache, neck pain, abdominal pain.     Impression & Plan    Medical Decision Making:  Melissa Jean Penrose is a 66 year old female who presented to the emergency department with ankle pain. Her vital signs were normal. It does appear from her chart that she does have short term memory issues and so patient cannot recall if she twisted her ankle or what happened to cause the pain. Her friend thinks that she hit her foot on something and then twisted her foot. There is mild swelling over her right lateral malleolus. There is erythema/ecchymosis over the lateral aspect of her right foot. She is able to move her right ankle with no pain and unlikely septic arthritis. Her x-rays were unremarkable. She probably twisted and sprained her ankle. I recommended ice and elevation. She is able to ambulate. Considered infection but this seems less likely given history/exam. Patient does not want to be on any antibiotics. Recommended that staff at her facility monitor the swelling/ecchymosis/erythema. If worsening redness, may need  treatment for cellulitis though at this time likely ankle sprain. I discussed reasons to return to the emergency department with the patient.        Patient lives in long term care.

## 2017-06-28 ENCOUNTER — MEDICAL CORRESPONDENCE (OUTPATIENT)
Dept: HEALTH INFORMATION MANAGEMENT | Facility: CLINIC | Age: 67
End: 2017-06-28

## 2017-07-05 ENCOUNTER — CARE COORDINATION (OUTPATIENT)
Dept: CARE COORDINATION | Facility: CLINIC | Age: 67
End: 2017-07-05

## 2017-07-05 NOTE — PROGRESS NOTES
Clinic Care Coordination Contact  Care Team Conversations    Clinic Care Coordination Contact  Care Team Conversations        Clinical Data: call with patient's POA/friend  1) patient remains in memory care at [facility] in [place]  2) decision made that patient would not be able to navigate & be safe in [facility] level of care  3) patient continues to talk of returning home; discussion that past attempts has lead to patient having services for awhile & then not accepting services which leads to her not eating & returning to drinking; currently patient is eating & not drinking plus she is getting her meds as prescribed  4) POA/friend agreed that for patient's safety that patient being in memory care is what is the best; discussion on how to handle patient's request to go home; also, POA/friend is seeing patient only 2x/wk so less with frequency of visits it seems to be less of a discussion  5) patient's TBI was 2 years ago; along with patient's level of drinking & TBI, it is unlikely that her cognitive abilities will approve at all; [facility] staff has told POA/friend, that 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  Clinic Care Coordinator, SW with [facility]  [phone]

## 2017-07-20 ENCOUNTER — MEDICAL CORRESPONDENCE (OUTPATIENT)
Dept: HEALTH INFORMATION MANAGEMENT | Facility: CLINIC | Age: 67
End: 2017-07-20

## 2017-07-26 ENCOUNTER — TRANSFERRED RECORDS (OUTPATIENT)
Dept: HEALTH INFORMATION MANAGEMENT | Facility: CLINIC | Age: 67
End: 2017-07-26

## 2017-08-01 ENCOUNTER — CARE COORDINATION (OUTPATIENT)
Dept: CARE COORDINATION | Facility: CLINIC | Age: 67
End: 2017-08-01

## 2017-08-01 NOTE — PROGRESS NOTES
Clinic Care Coordination Contact  Care Team Conversations        Clinical Data: call with POA & health care directive agent plus auth to discuss PHI on file  1) patient continues to be in memory care at [facility]  2) patient seen at [facility]; patient told that she has Alzheimer's; patient stated that she did not want to hear that (home care OT report stated that patient had issues with short term memory & recall in 06/20147); patient is to have neuropsychic testing in 01/2018; Clinic Care Coordinator, LONDON stated being told that patient has Alzheimer's supports what has been seen in the way of patient's behavior over past months  3) contact has been made with [employer] to sell patient's home; patient seems okay with this; patient is requesting her baby grand piano; contact is uncertain if patient is still able to play & there are baby grand pianos in public areas of [facility] that patient is currently at  4) patient had episode of being belligerent; was connected to patient not getting her meds by 9am & now that staff understands that patient wants her meds by 9am & becomes upset is it does not occur, no more episodes  5) patient's 3 months of home care under Medicare benefits should be ending in the next month; contact will want to have any services that have been provided by home care to continue for consistency for patient  6) contact is considering move to different [facility] that would have more activities but has not made a firm decision on that at this time      Plan:      POA can outreach to Clinic Care Coordinator, LONDON as needed  DIMAS Gonsales, [facility]  Clinic Care Coordinator, L[place]ON with  [facility]  [phone]

## 2017-08-14 ENCOUNTER — TELEPHONE (OUTPATIENT)
Dept: FAMILY MEDICINE | Facility: CLINIC | Age: 67
End: 2017-08-14

## 2017-08-14 NOTE — TELEPHONE ENCOUNTER
Mray-Simón Gomes calling  Update to Provider:  1) patient was seen in July by Provider at [facility]  Neuropsych testing and Neuropsych fu was ordered by this Provider for agitation patient has not yet be scheduled   Patient has eloped aug 2 and came back  Showed up 45 min later and it was reported  Alcohol was found in her room over weekend (2 bottles, one empty) surekha  Patient complaining of headaches, not sure if she is going through withdrawals  Mary calling to see what should be done next  Mary can be reached on her work mobile at [phone] or you can call Ashley directly at     [phone]    Hawa TRIPP

## 2017-08-14 NOTE — TELEPHONE ENCOUNTER
It is unclear based on the note if there is an acute medical concern. If Soham has been sober in the living facility up until now, it would be unlikely that she is acutely withdrawing  from ETOH.  At this time, I would have the  and  work on a safety plan for soham to avoid having her elope from the facility.   Regarding her medical situation.  Please ask them to take her vitals and monitor for any signs of withdrawal .  If she is having any acute symptoms she should be seen in the ED

## 2017-08-14 NOTE — TELEPHONE ENCOUNTER
Mary notified with recommendations noted below.  States patient has not concerns at this time.  Will motnior for signs of withdrawal and bring to ED as advised.  States patient is in a locked unit and notes they believe to note how ETOH was obtained.    Rona Khan RN - Triage  [facility]

## 2017-08-28 ENCOUNTER — CARE COORDINATION (OUTPATIENT)
Dept: CARE COORDINATION | Facility: CLINIC | Age: 67
End: 2017-08-28

## 2017-08-28 NOTE — PROGRESS NOTES
Clinic Care Coordination Contact  [facility]/Voicemail    Referral Source: PCP  Clinical Data: Care Coordinator Outreach    Reviewed telephone call of 08/14/2017; patient had eloped from [facility]; patient has been found to have alcohol in her room with some of the alcohol consumed    Outreach attempted x 1.  Left message on voicemail with call back information and requested return call.  Plan: . Care Coordinator will try to reach patient's POA & auth to discuss PHI again in 3-5 business days.  DIMAS Gonsales, [facility]  Clinic Care Coordinator,  with [facility]  [phone]

## 2017-08-30 ENCOUNTER — DOCUMENTATION ONLY (OUTPATIENT)
Dept: OTHER | Facility: CLINIC | Age: 67
End: 2017-08-30

## 2017-08-30 DIAGNOSIS — Z71.89 ACP (ADVANCE CARE PLANNING): Chronic | ICD-10-CM

## 2017-09-01 NOTE — PROGRESS NOTES
Clinic Care Coordination Contact  [facility]/Voicemail     Referral Source: PCP  Clinical Data: Care Coordinator Outreach     Reviewed telephone call of 08/14/2017; patient had eloped from [facility]; patient has been found to have alcohol in her room with some of the alcohol consumed     Outreach attempted x 2  Left message on voicemail with call back information and requested return call.  Plan: . No unable to contact letter as communication is with POA. If no contact in 2 weeks, will do 1 more call out & if no contact then will close to continue outreach  DIMAS Gonsales, [facility]  Clinic Care Coordinator, SW with [facility]  [phone]

## 2017-09-15 NOTE — PROGRESS NOTES
Clinic Care Coordination Contact  [facility]/Voicemail      Referral Source: PCP  Clinical Data: Care Coordinator Outreach      Reviewed telephone call of 08/14/2017; patient had eloped from [facility]; patient has been found to have alcohol in her room with some of the alcohol consumed      Outreach attempted x 3  Left message on voicemail with call back information and requested return call.  Plan: . Will wait 2 weeks to see if contact responds & if not then will close to continue outreach  DIMAS Gonsales, [facility]  Clinic Care Coordinator,  with [facility]  [phone]

## 2017-10-04 NOTE — PROGRESS NOTES
Clinic Care Coordination Contact    Situation: Patient chart reviewed by care coordinator.    Background: Clinic Care CoordinatorLONDON has provided support & guidance to patient's POA.  Patient now resides in [facility] in memory care  Clinic Care LONDON Oscar left  with POA on 08/28, 09/01 & 09/15/2017 with no return call. No unable to contact letter sent as do not have POA  s address    Assessment: Patient in facility that best meets her needs. No need for continued follow up by Clinic Care CoordinatorLONDON      Plan/Recommendations: Patient can be referred to care coordination again if future needs arise.    DIMAS Gonsales, [facility]  Clinic Care CoordinatorLONDON with [facility]  [phone]

## 2017-10-25 DIAGNOSIS — G43.009 MIGRAINE WITHOUT AURA AND WITHOUT STATUS MIGRAINOSUS, NOT INTRACTABLE: ICD-10-CM

## 2017-10-25 NOTE — TELEPHONE ENCOUNTER
Reason for Call:  Medication or medication refill:    Do you use a [facility]?  Name of the pharmacy and phone number for the current request:   [facility], [address]     Name of the medication requested: Sumatriptan    Can we leave a detailed message on this number? YES    Phone number patient can be reached at: Cell number on file:    [phone]     Best Time: Anytime    Call taken on 10/25/2017 at 10:30 AM by Thais Cardoso

## 2017-10-26 NOTE — TELEPHONE ENCOUNTER
sumatriptan     Last Written Prescription Date: 5/8/2017  Last Fill Quantity: 30, # refills: 0  Last Office Visit with [facility], [facility] or [facility] prescribing provider: 6/6/2017       BP Readings from Last 3 Encounters:   06/15/17 106/67   06/06/17 91/57   05/09/17 111/69

## 2017-10-31 ENCOUNTER — OFFICE VISIT (OUTPATIENT)
Dept: FAMILY MEDICINE | Facility: CLINIC | Age: 67
End: 2017-10-31
Payer: COMMERCIAL

## 2017-10-31 VITALS
WEIGHT: 252 LBS | DIASTOLIC BLOOD PRESSURE: 73 MMHG | OXYGEN SATURATION: 98 % | BODY MASS INDEX: 35.28 KG/M2 | SYSTOLIC BLOOD PRESSURE: 115 MMHG | TEMPERATURE: 98.5 F | HEIGHT: 71 IN | HEART RATE: 79 BPM | RESPIRATION RATE: 20 BRPM

## 2017-10-31 DIAGNOSIS — G43.009 MIGRAINE WITHOUT AURA AND WITHOUT STATUS MIGRAINOSUS, NOT INTRACTABLE: ICD-10-CM

## 2017-10-31 DIAGNOSIS — S06.9X9S TRAUMATIC BRAIN INJURY WITH LOSS OF CONSCIOUSNESS, SEQUELA (H): Primary | ICD-10-CM

## 2017-10-31 DIAGNOSIS — R51.9 NONINTRACTABLE HEADACHE, UNSPECIFIED CHRONICITY PATTERN, UNSPECIFIED HEADACHE TYPE: ICD-10-CM

## 2017-10-31 DIAGNOSIS — F10.10 ALCOHOL ABUSE: ICD-10-CM

## 2017-10-31 DIAGNOSIS — Z23 NEED FOR PROPHYLACTIC VACCINATION AND INOCULATION AGAINST INFLUENZA: ICD-10-CM

## 2017-10-31 PROCEDURE — G0008 ADMIN INFLUENZA VIRUS VAC: HCPCS | Performed by: PHYSICIAN ASSISTANT

## 2017-10-31 PROCEDURE — 90662 IIV NO PRSV INCREASED AG IM: CPT | Performed by: PHYSICIAN ASSISTANT

## 2017-10-31 PROCEDURE — 99214 OFFICE O/P EST MOD 30 MIN: CPT | Mod: 25 | Performed by: PHYSICIAN ASSISTANT

## 2017-10-31 RX ORDER — SUMATRIPTAN 100 MG/1
TABLET, FILM COATED ORAL
Qty: 18 TABLET | Refills: 0 | Status: SHIPPED | OUTPATIENT
Start: 2017-10-31 | End: 2017-12-21

## 2017-10-31 RX ORDER — SUMATRIPTAN 100 MG/1
100 TABLET, FILM COATED ORAL
Qty: 30 TABLET | Refills: 3 | Status: SHIPPED | OUTPATIENT
Start: 2017-10-31 | End: 2017-10-31 | Stop reason: DRUGHIGH

## 2017-10-31 ASSESSMENT — ANXIETY QUESTIONNAIRES
3. WORRYING TOO MUCH ABOUT DIFFERENT THINGS: NOT AT ALL
2. NOT BEING ABLE TO STOP OR CONTROL WORRYING: NOT AT ALL
GAD7 TOTAL SCORE: 0
1. FEELING NERVOUS, ANXIOUS, OR ON EDGE: NOT AT ALL
5. BEING SO RESTLESS THAT IT IS HARD TO SIT STILL: NOT AT ALL
6. BECOMING EASILY ANNOYED OR IRRITABLE: NOT AT ALL
7. FEELING AFRAID AS IF SOMETHING AWFUL MIGHT HAPPEN: NOT AT ALL
IF YOU CHECKED OFF ANY PROBLEMS ON THIS QUESTIONNAIRE, HOW DIFFICULT HAVE THESE PROBLEMS MADE IT FOR YOU TO DO YOUR WORK, TAKE CARE OF THINGS AT HOME, OR GET ALONG WITH OTHER PEOPLE: NOT DIFFICULT AT ALL

## 2017-10-31 ASSESSMENT — PATIENT HEALTH QUESTIONNAIRE - PHQ9
5. POOR APPETITE OR OVEREATING: NOT AT ALL
SUM OF ALL RESPONSES TO PHQ QUESTIONS 1-9: 0

## 2017-10-31 NOTE — NURSING NOTE
"Chief Complaint   Patient presents with     Eval/Assessment       Initial /73 (BP Location: Right arm, Patient Position: Chair, Cuff Size: Adult Regular)  Pulse 79  Temp 98.5  F (36.9  C) (Tympanic)  Resp 20  Ht 5' 11\" (1.803 m)  Wt 252 lb (114.3 kg)  SpO2 98%  BMI 35.15 kg/m2 Estimated body mass index is 35.15 kg/(m^2) as calculated from the following:    Height as of this encounter: 5' 11\" (1.803 m).    Weight as of this encounter: 252 lb (114.3 kg).  Medication Reconciliation: complete    Rebecca Harris MA  "

## 2017-10-31 NOTE — PROGRESS NOTES
SUBJECTIVE:   Melissa Jean Penrose is a 67 year old female who presents to clinic today for the following health issues:    Eval/Assessment    Suzanne presents today with her POAReena to discuss long term management of her headaches    Headaches: Suzanne has a PMH significant for TBI, alcohol dependence, and early onset dementia and chronic headaches.  She has most recently been staying at [facility], but she is upset as she is the youngest person on her floor.  She plans to be moved to the [facility] unit next month with people in her age group and and similar level of function.  She presents today complaining that she continues to have a headache everyday. She is a poor historian, but notes that the headache is constant and occurs when she wakes up until she goes to bed every day.  She takes topamax for headaches and was previously taking nortriptyline, but this was stopped as she reported no benefit.  She is prescribed PRN imitrex for her headaches, although she notes that she does not remember that she has to request this medication and so she is unsure if she is taking it.  Per Reena, the nurses at the memory care unit say that she runs out of her 18 pills per month intermediate through the month.  Reena is unsure if she is being given this medication daily or requesting it.  Reena continued to have concerns that Suzanne is sneaking alcohol into the unit, but Suzanne denies this.  She has been seen by TBI clinic at [facility] and most recently by Dr. Randle at [facility].  She will have neuropsych testing done 01/25/2017.        Patient Active Problem List   Diagnosis     Migraine without aura and without status migrainosus, not intractable     Short-term memory loss     Traumatic brain injury with loss of consciousness, sequela (H)     Hypertension goal BP (blood pressure) < 140/80     Alcohol abuse     Frequent falls     Pulmonary nodules     ACP (advance care planning)     Fall      Depression     Elevated liver enzymes     Past Surgical History:   Procedure Laterality Date     ORTHOPEDIC SURGERY       PHACOEMULSIFICATION CLEAR CORNEA WITH STANDARD INTRAOCULAR LENS IMPLANT Right 2017    Procedure: PHACOEMULSIFICATION CLEAR CORNEA WITH STANDARD INTRAOCULAR LENS IMPLANT;  Surgeon: Brianna Christy MD;  [facility]       Social History   Substance Use Topics     Smoking status: Former Smoker     Quit date: 10/23/1985     Smokeless tobacco: Never Used     Alcohol use 1.2 oz/week     2 Shots of liquor per week      Comment: 2 drinks a night     Family History   Problem Relation Age of Onset     Alzheimer Disease Mother 58      71         Current Outpatient Prescriptions   Medication Sig Dispense Refill     SUMAtriptan (IMITREX) 100 MG tablet Take 100 mg mg at the onset of the headache, may repeat in 2 hours if needed PRN. This may not be used no more than 4 times weekly. This is PRN medication to be given when patient has headache 18 tablet 0     mirtazapine (REMERON) 15 MG tablet Take 1 tablet (15 mg) by mouth At Bedtime 30 tablet 0     losartan (COZAAR) 100 MG tablet Take 1 tablet (100 mg) by mouth daily 30 tablet 0     metoprolol (LOPRESSOR) 25 MG tablet Take 1 tablet (25 mg) by mouth 2 times daily 60 tablet 0     acetaminophen (TYLENOL) 325 MG tablet Take 2 tablets (650 mg) by mouth every 6 hours as needed for mild pain 100 tablet 0     montelukast (SINGULAIR) 10 MG tablet Take 1 tablet (10 mg) by mouth At Bedtime 30 tablet 0     topiramate (TOPAMAX) 25 MG tablet Take 1 tablet (25 mg) by mouth 2 times daily 60 tablet 0     azelastine (ASTELIN) 0.1 % spray Spray 2 sprays into both nostrils every morning 1 Bottle 0     mometasone (NASONEX) 50 MCG/ACT spray Spray 2 sprays into both nostrils At Bedtime 17 g 0     thiamine 100 MG tablet Take 1 tablet (100 mg) by mouth daily 30 tablet 0     Ascorbic Acid (VITAMIN C) 500 MG CHEW Take 500 mg by mouth At Bedtime 30 tablet 0      "omeprazole 20 MG tablet Take 1 tablet (20 mg) by mouth daily 30 tablet 0     folic acid (FOLVITE) 1 MG tablet Take 1 tablet (1 mg) by mouth daily 30 tablet 0     Allergies   Allergen Reactions     Penicillins Swelling     Reports facial swelling, tolerated ceftriaxone per Care Everywhere in 10/2014     Tree Nuts  [Nuts] Other (See Comments)     Vomiting, throat closing, digestive issues     Nuts [Peanut-Derived] Nausea and Vomiting     Amoxicillin      Morphine          Reviewed and updated as needed this visit by clinical staff     Reviewed and updated as needed this visit by Provider         ROS:  Constitutional, HEENT, cardiovascular, pulmonary, GI, , musculoskeletal, neuro, skin, endocrine and psych systems are negative, except as otherwise noted.      OBJECTIVE:   /73 (BP Location: Right arm, Patient Position: Chair, Cuff Size: Adult Regular)  Pulse 79  Temp 98.5  F (36.9  C) (Tympanic)  Resp 20  Ht 5' 11\" (1.803 m)  Wt 252 lb (114.3 kg)  SpO2 98%  BMI 35.15 kg/m2  Body mass index is 35.15 kg/(m^2).  GENERAL: healthy, alert and no distress  PSYCH: mentation appears normal, concentration poor, confused, inattentive, affect flat, judgement and insight impaired and appearance well groomed    Diagnostic Test Results:  none     ASSESSMENT/PLAN:       1. Traumatic brain injury with loss of consciousness, sequela (H)  Suzanne is scheduled for neuropsychiatric testing to help to determine the extent and type of dementia from which she is suffering.  Will continue to follow    2. Alcohol abuse  I have strongly encouraged Suzanne to abstain from drinking alcohol.  She will be moved to a new memory care facility next month which may help her to interact with others of her age group and help her to have more social contacts and avoid isolating  Herself and drinking.     3. Nonintractable headache, unspecified chronicity pattern, unspecified headache type  I suspect this is multifactorial in etiology.  She " has a hx of migraines, but current daily headache may be due to daily use of Imitrex and having breakthrough headaches vs. Heavy drinking.  I am going to call her neurologist regarding recommendations for preventative medication.  New script for Imitrex with strict PRN instructions was faxed to pharmacy and Reena will speak with the medication nurse to ensure this med is being dispensed correctly max 3 times weekly.    4. Need for prophylactic vaccination and inoculation against influenza  - FLU VACCINE, INCREASED ANTIGEN, PRESV FREE, AGE 65+ [77863]  - Vaccine Administration, Initial [52936]    5. Migraine without aura and without status migrainosus, not intractable  - SUMAtriptan (IMITREX) 100 MG tablet; Take 100 mg mg at the onset of the headache, may repeat in 2 hours if needed PRN. This may not be used no more than 4 times weekly. This is PRN medication to be given when patient has headache  Dispense: 18 tablet; Refill: 0    Follow-Up: 3 months      Total time: 40 minutes:  Discussing next steps in treatment, coordinating care and medication management.  > 50 % of time spent counseling        Thuan Banda PA-C  [facility]

## 2017-10-31 NOTE — PROGRESS NOTES

## 2017-10-31 NOTE — MR AVS SNAPSHOT
After Visit Summary   10/31/2017    Melissa Jean Penrose    MRN: [id number]           Patient Information     Date Of Birth          1950        Visit Information        Provider Department      10/31/2017 2:20 PM hTuan Banda PA-C [facility]        Today's Diagnoses     Traumatic brain injury with loss of consciousness, sequela (H)    -  1    Alcohol abuse        Nonintractable headache, unspecified chronicity pattern, unspecified headache type        Need for prophylactic vaccination and inoculation against influenza        Migraine without aura and without status migrainosus, not intractable           Follow-ups after your visit        Your next 10 appointments already scheduled     Nov 07, 2017  2:00 PM CST   New Visit with Sacha Mark DPM   [facility] ([facility])    [address] [place] [id number] [phone]              Who to contact     If you have questions or need follow up information about today's clinic visit or your schedule please contact [facility] directly at [phone].  Normal or non-critical lab and imaging results will be communicated to you by [employer], letter or phone within 4 business days after the clinic has received the results. If you do not hear from us within 7 days, please contact the clinic through [employer] or phone. If you have a critical or abnormal lab result, we will notify you by phone as soon as possible.  Submit refill requests through [place] or call your pharmacy and they will forward the refill request to us. Please allow 3 business days for your refill to be completed.          Additional Information About Your Visit        MyChart Information     [employer] gives you secure access to your electronic health record. If you see a primary care provider, you can also send messages to your care team and make appointments. If you have questions, please call your  "primary care clinic.  If you do not have a primary care provider, please call [phone] and they will assist you.        Care EveryWhere ID     This is your Care EveryWhere ID. This could be used by other organizations to access your [place] medical records  [id number]        Your Vitals Were     Pulse Temperature Respirations Height Pulse Oximetry BMI (Body Mass Index)    79 98.5  F (36.9  C) (Tympanic) 20 5' 11\" (1.803 m) 98% 35.15 kg/m2       Blood Pressure from Last 3 Encounters:   10/31/17 115/73   06/15/17 106/67   06/06/17 91/57    Weight from Last 3 Encounters:   10/31/17 252 lb (114.3 kg)   06/15/17 235 lb (106.6 kg)   05/02/17 226 lb 6.6 oz (102.7 kg)              We Performed the Following     FLU VACCINE, INCREASED ANTIGEN, PRESV FREE, AGE 65+ [65387]     Vaccine Administration, Initial [75299]          Today's Medication Changes          These changes are accurate as of: 10/31/17  7:32 PM.  If you have any questions, ask your nurse or doctor.               These medicines have changed or have updated prescriptions.        Dose/Directions    SUMAtriptan 100 MG tablet   Commonly known as:  IMITREX   This may have changed:    - how much to take  - how to take this  - when to take this  - reasons to take this  - additional instructions   Used for:  Migraine without aura and without status migrainosus, not intractable   Changed by:  Thuan Banda PA-C        Take 100 mg mg at the onset of the headache, may repeat in 2 hours if needed PRN. This may not be used no more than 4 times weekly. This is PRN medication to be given when patient has headache   Quantity:  18 tablet   Refills:  0            Where to get your medicines      Some of these will need a paper prescription and others can be bought over the counter.  Ask your nurse if you have questions.     Bring a paper prescription for each of these medications     SUMAtriptan 100 MG tablet                Primary Care Provider Office Phone # " Fax #    Thuan Banda PA-C [phone] [address]  [place] [address]        Equal Access to Services     JESSI TURCIOS : Hadii abdirizak ku hadoreno Soadyali, waaxda luqadaha, qaybta kaalmada adeegyada, maggy gunn laSeemalevon anne. So [facility] [phone].    ATENCIÓN: Si habla español, tiene a gomez disposición servicios gratuitos de asistencia lingüística. Llame al [phone].    We comply with applicable federal civil rights laws and [place] laws. We do not discriminate on the basis of race, color, national origin, age, disability, sex, sexual orientation, or gender identity.            Thank you!     Thank you for choosing [facility] [place]  for your care. Our goal is always to provide you with excellent care. Hearing back from our patients is one way we can continue to improve our services. Please take a few minutes to complete the written survey that you may receive in the mail after your visit with us. Thank you!             Your Updated Medication List - Protect others around you: Learn how to safely use, store and throw away your medicines at www.disposemymeds.org.          This list is accurate as of: 10/31/17  7:32 PM.  Always use your most recent med list.                   Brand Name Dispense Instructions for use Diagnosis    acetaminophen 325 MG tablet    TYLENOL    100 tablet    Take 2 tablets (650 mg) by mouth every 6 hours as needed for mild pain    Episodic tension-type headache, not intractable       azelastine 0.1 % spray    ASTELIN    1 Bottle    Spray 2 sprays into both nostrils every morning    Allergic rhinitis due to pollen, unspecified rhinitis seasonality       folic acid 1 MG tablet    FOLVITE    30 tablet    Take 1 tablet (1 mg) by mouth daily    Alcoholism /alcohol abuse (H)       losartan 100 MG tablet    COZAAR    30 tablet    Take 1 tablet (100 mg) by mouth daily    Hypertension goal BP (blood pressure) < 140/80        metoprolol 25 MG tablet    LOPRESSOR    60 tablet    Take 1 tablet (25 mg) by mouth 2 times daily    Hypertension goal BP (blood pressure) < 140/80       mirtazapine 15 MG tablet    REMERON    30 tablet    Take 1 tablet (15 mg) by mouth At Bedtime    Severe recurrent major depression without psychotic features (H)       mometasone 50 MCG/ACT spray    NASONEX    17 g    Spray 2 sprays into both nostrils At Bedtime    Allergic rhinitis due to pollen, unspecified rhinitis seasonality       montelukast 10 MG tablet    SINGULAIR    30 tablet    Take 1 tablet (10 mg) by mouth At Bedtime    Allergic rhinitis due to pollen, unspecified rhinitis seasonality       omeprazole 20 MG tablet     30 tablet    Take 1 tablet (20 mg) by mouth daily    Gastroesophageal reflux disease without esophagitis       SUMAtriptan 100 MG tablet    IMITREX    18 tablet    Take 100 mg mg at the onset of the headache, may repeat in 2 hours if needed PRN. This may not be used no more than 4 times weekly. This is PRN medication to be given when patient has headache    Migraine without aura and without status migrainosus, not intractable       thiamine 100 MG tablet     30 tablet    Take 1 tablet (100 mg) by mouth daily    Alcoholism /alcohol abuse (H)       topiramate 25 MG tablet    TOPAMAX    60 tablet    Take 1 tablet (25 mg) by mouth 2 times daily    Migraine without aura and without status migrainosus, not intractable       vitamin C 500 MG Chew     30 tablet    Take 500 mg by mouth At Bedtime    Alcoholism /alcohol abuse (H)

## 2017-10-31 NOTE — TELEPHONE ENCOUNTER
Requested Prescriptions   Pending Prescriptions Disp Refills     SUMAtriptan (IMITREX) 100 MG tablet 30 tablet 0     Sig: Take 1 tablet (100 mg) by mouth at onset of headache (Take every 2 hours as needed. Max 200mg in 24 hours.) Reported on 4/27/2017    There is no refill protocol information for this order        Prescription approved per [facility] Refill Protocol.  Mariel Richardson RN   [facility] - Triage

## 2017-11-01 ASSESSMENT — ANXIETY QUESTIONNAIRES: GAD7 TOTAL SCORE: 0

## 2017-11-02 ENCOUNTER — TELEPHONE (OUTPATIENT)
Dept: FAMILY MEDICINE | Facility: CLINIC | Age: 67
End: 2017-11-02

## 2017-11-02 NOTE — TELEPHONE ENCOUNTER
Called Reena with information obtained from patient neurologist regarding headache medications.  LM to have her call me back.  Dr. Randle continues to recommend conservative management without the use of an additional preventative medication.  She may use the Imitrex no more than 3 times weekly, tylenol PRN and cold packs to her forehead.  Please inform reena of these recommendations, if she would like to speak with me, let me know

## 2017-11-02 NOTE — TELEPHONE ENCOUNTER
Reena notified with recommendations and agrees with plan.  Rona Khan RN - Triage  [facility]

## 2017-11-07 ENCOUNTER — OFFICE VISIT (OUTPATIENT)
Dept: PODIATRY | Facility: CLINIC | Age: 67
End: 2017-11-07

## 2017-11-07 DIAGNOSIS — Z53.9 ERRONEOUS ENCOUNTER--DISREGARD: Primary | ICD-10-CM

## 2017-11-07 NOTE — PATIENT INSTRUCTIONS
FOOT CARE NURSES  If you are interested in having a foot care nurse come out to your   home, please call one of these contacts for more information:  [place] [facility]  [phone] [place]  [phone]   [facility]  [phone]  [place] [phone]  [place]   [place] [facility]  [phone]  At [facility] [phone]

## 2017-11-07 NOTE — LETTER
11/7/2017         RE: Melissa Jean Penrose  [address] [place] [address]        Dear Colleague,    Thank you for referring your patient, Melissa Jean Penrose, to the [facility]. Please see a copy of my visit note below.    Pt presented today requesting nail trimming.  She stated this is the primary reason for her visit.  She was quite upset to learn that we normally don't do nail trimming and that it might not be covered under insurance.  Pt cursed at my assistant.  I spoke to the pt, explained the above, and offered to see her today and debride her nails, but she would have to sign the ABN prior.  She refused to sign the ABN.  Pt did accept a list of nail care resources.  Pt then left the office clearly frustrated.    Again, thank you for allowing me to participate in the care of your patient.        Sincerely,        Sacha Mark DPM

## 2017-11-07 NOTE — MR AVS SNAPSHOT
After Visit Summary   11/7/2017    Melissa Jean Penrose    MRN: [id number]           Patient Information     Date Of Birth          1950        Visit Information        Provider Department      11/7/2017 2:00 PM Sacha Mark DPM [facility]        Care Instructions    FOOT CARE NURSES  If you are interested in having a foot care nurse come out to your   home, please call one of these contacts for more information:  [place] [facility]  [phone] [facility]  [phone]   [facility]  [phone]  [facility] [phone]  [place]   [place] [facility]  [phone]  At [place] [facility] [phone]                     Follow-ups after your visit        Who to contact     If you have questions or need follow up information about today's clinic visit or your schedule please contact [facility] directly at [phone].  Normal or non-critical lab and imaging results will be communicated to you by [employer], letter or phone within 4 business days after the clinic has received the results. If you do not hear from us within 7 days, please contact the clinic through [employer] or phone. If you have a critical or abnormal lab result, we will notify you by phone as soon as possible.  Submit refill requests through [employer] or call your pharmacy and they will forward the refill request to us. Please allow 3 business days for your refill to be completed.          Additional Information About Your Visit        [employer] Information     [employer] gives you secure access to your electronic health record. If you see a primary care provider, you can also send messages to your care team and make appointments. If you have questions, please call your primary care clinic.  If you do not have a primary care provider, please call [phone] and they will assist you.        Care EveryWhere ID     This is your Care EveryWhere  ID. This could be used by other organizations to access your [place] medical records  [id number]         Blood Pressure from Last 3 Encounters:   10/31/17 115/73   06/15/17 106/67   06/06/17 91/57    Weight from Last 3 Encounters:   10/31/17 252 lb (114.3 kg)   06/15/17 235 lb (106.6 kg)   05/02/17 226 lb 6.6 oz (102.7 kg)              Today, you had the following     No orders found for display       Primary Care Provider Office Phone # Fax #    Thuan Banda PA-C [phone]       [address] [place] [facility] [place] [place] [address]        Equal Access to Services     MEENA TURCIOS : Hadii aad ku hadasho Soomaali, waaxda luqadaha, qaybta kaalmada adeegyada, waxanurag nicolas . So [facility] [phone].    ATENCIÓN: Si habla español, tiene a gomez disposición servicios gratuitos de asistencia lingüística. [facility] [phone].    We comply with applicable federal civil rights laws and [place] laws. We do not discriminate on the basis of race, color, national origin, age, disability, sex, sexual orientation, or gender identity.            Thank you!     Thank you for choosing [facility]  for your care. Our goal is always to provide you with excellent care. Hearing back from our patients is one way we can continue to improve our services. Please take a few minutes to complete the written survey that you may receive in the mail after your visit with us. Thank you!             Your Updated Medication List - Protect others around you: Learn how to safely use, store and throw away your medicines at www.disposemymeds.org.          This list is accurate as of: 11/7/17  2:24 PM.  Always use your most recent med list.                   Brand Name Dispense Instructions for use Diagnosis    acetaminophen 325 MG tablet    TYLENOL    100 tablet    Take 2 tablets (650 mg) by mouth every 6 hours as needed for mild pain    Episodic tension-type headache, not intractable        azelastine 0.1 % spray    ASTELIN    1 Bottle    Spray 2 sprays into both nostrils every morning    Allergic rhinitis due to pollen, unspecified rhinitis seasonality       folic acid 1 MG tablet    FOLVITE    30 tablet    Take 1 tablet (1 mg) by mouth daily    Alcoholism /alcohol abuse (H)       losartan 100 MG tablet    COZAAR    30 tablet    Take 1 tablet (100 mg) by mouth daily    Hypertension goal BP (blood pressure) < 140/80       metoprolol 25 MG tablet    LOPRESSOR    60 tablet    Take 1 tablet (25 mg) by mouth 2 times daily    Hypertension goal BP (blood pressure) < 140/80       mirtazapine 15 MG tablet    REMERON    30 tablet    Take 1 tablet (15 mg) by mouth At Bedtime    Severe recurrent major depression without psychotic features (H)       mometasone 50 MCG/ACT spray    NASONEX    17 g    Spray 2 sprays into both nostrils At Bedtime    Allergic rhinitis due to pollen, unspecified rhinitis seasonality       montelukast 10 MG tablet    SINGULAIR    30 tablet    Take 1 tablet (10 mg) by mouth At Bedtime    Allergic rhinitis due to pollen, unspecified rhinitis seasonality       omeprazole 20 MG tablet     30 tablet    Take 1 tablet (20 mg) by mouth daily    Gastroesophageal reflux disease without esophagitis       SUMAtriptan 100 MG tablet    IMITREX    18 tablet    Take 100 mg mg at the onset of the headache, may repeat in 2 hours if needed PRN. This may not be used no more than 4 times weekly. This is PRN medication to be given when patient has headache    Migraine without aura and without status migrainosus, not intractable       thiamine 100 MG tablet     30 tablet    Take 1 tablet (100 mg) by mouth daily    Alcoholism /alcohol abuse (H)       topiramate 25 MG tablet    TOPAMAX    60 tablet    Take 1 tablet (25 mg) by mouth 2 times daily    Migraine without aura and without status migrainosus, not intractable       vitamin C 500 MG Chew     30 tablet    Take 500 mg by mouth At Bedtime    Alcoholism  /alcohol abuse (H)

## 2017-11-29 ENCOUNTER — TELEPHONE (OUTPATIENT)
Dept: FAMILY MEDICINE | Facility: CLINIC | Age: 67
End: 2017-11-29

## 2017-11-29 NOTE — TELEPHONE ENCOUNTER
Medication list signed by provider. Please fax # [phone], patient will be admitted tomorrow 11/30/17.  Thank you  Jyotsna Austin

## 2017-11-30 ENCOUNTER — MEDICAL CORRESPONDENCE (OUTPATIENT)
Dept: HEALTH INFORMATION MANAGEMENT | Facility: CLINIC | Age: 67
End: 2017-11-30

## 2017-11-30 NOTE — TELEPHONE ENCOUNTER
TRENTON Stout at the [facility] calling regarding patients med list. She states there were 2 medications patient was given at last facility that are not on med list and RN wants to clarify.     Quetiapine 25 mg 1 tab BID PRN and therabulk 400 mcg 1 tab PO at bedtime    Please review and advise. If appropriate to continue, RN requires updated med list faxed to below. Please call Argelia CHOWDHURY with response [phone].     Mariel Richardson RN   [facility] - Triage

## 2017-11-30 NOTE — TELEPHONE ENCOUNTER
Spoke with RN and informed of below. She looked at patients bottles for both Quetiapine and Therabulk which say they were prescribed by Thuan Banda PA-C on 7/26/17 and filled by [facility]. Advised this is no where on med list and we have no record of refilling any medication since May other than imitrex. Routing back to provider to review. RN states that there are still 25/ 30 pills of quetiapine remaining so she has only had 5 pills since July. Should meds be d/c?    Mariel Richardson RN   [facility] - Triage

## 2017-11-30 NOTE — TELEPHONE ENCOUNTER
I do not have these medications on her list and do not see these in her past notes or hospital admission.  Please have them contact the prescribing physician

## 2017-12-01 NOTE — TELEPHONE ENCOUNTER
Patient RN notified with information noted below from provider and agrees with plan.  Rona Khan RN - Triage  [facility]

## 2017-12-01 NOTE — TELEPHONE ENCOUNTER
Left non detailed message for TRENTON Stout to return call.  Mariel Richardson RN   [facility] - Triage

## 2017-12-21 DIAGNOSIS — G43.009 MIGRAINE WITHOUT AURA AND WITHOUT STATUS MIGRAINOSUS, NOT INTRACTABLE: ICD-10-CM

## 2017-12-21 RX ORDER — SUMATRIPTAN 100 MG/1
TABLET, FILM COATED ORAL
Qty: 9 TABLET | Refills: 10 | Status: SHIPPED | OUTPATIENT
Start: 2017-12-21

## 2017-12-21 NOTE — TELEPHONE ENCOUNTER
Imitrex      Last Written Prescription Date: 10/31/2017  Last Fill Quantity: 18,  # refills: 0   Last Office Visit with [facility], P or [facility] prescribing provider: 10/31/2017                                             Routing refill request to provider for review/approval because:  Drug not on the [facility] refill protocol   Rona Khan RN - Triage  [facility]

## 2018-01-03 ENCOUNTER — MEDICAL CORRESPONDENCE (OUTPATIENT)
Dept: HEALTH INFORMATION MANAGEMENT | Facility: CLINIC | Age: 68
End: 2018-01-03

## 2018-01-03 DIAGNOSIS — K21.9 GASTROESOPHAGEAL REFLUX DISEASE WITHOUT ESOPHAGITIS: ICD-10-CM

## 2018-01-03 DIAGNOSIS — F33.2 SEVERE RECURRENT MAJOR DEPRESSION WITHOUT PSYCHOTIC FEATURES (H): ICD-10-CM

## 2018-01-03 DIAGNOSIS — I10 HYPERTENSION GOAL BP (BLOOD PRESSURE) < 140/80: ICD-10-CM

## 2018-01-04 NOTE — TELEPHONE ENCOUNTER
Requested Prescriptions   Pending Prescriptions Disp Refills     omeprazole 20 MG tablet [Pharmacy Med Name: OMEPRAZOLE 20MG TABLET DR] 28 tablet 11    Last Written Prescription Date:  2017  Last Fill Quantity: 30 tablet,  # refills: 0   Last Office Visit with [facility] or [facility] prescribing provider:  10/31/17   Future Office Visit:      Si TAB BY MOUTH DAILY    PPI Protocol Passed    1/3/2018 11:48 AM       Passed - Not on Clopidogrel (unless Pantoprazole ordered)       Passed - No diagnosis of osteoporosis on record       Passed - Recent or future visit with authorizing provider's specialty    Patient had office visit in the last year or has a visit in the next 30 days with authorizing provider.  See chart review.              Passed - Patient is age 18 or older       Passed - No active pregnacy on record       Passed - No positive pregnancy test in past 12 months        VITAMIN B-1 100 MG tablet [Pharmacy Med Name: VITAMIN B-1 100MG TABLET] 30 tablet 10    Last Written Prescription Date:  2017  Last Fill Quantity: 30 tablet,  # refills: 0   Last Office Visit with [facility] or [facility] prescribing provider:  10/31/17   Future Office Visit:      Si TAB BY MOUTH DAILY    There is no refill protocol information for this order        folic acid (FOLVITE) 1 MG tablet [Pharmacy Med Name: FOLIC ACID 1MG TABLET] 30 tablet 10    Last Written Prescription Date:  2017  Last Fill Quantity: 30 tablet,  # refills: 0   Last Office Visit with [facility] or [facility] prescribing provider:  10/31/2017   Future Office Visit:      Si TAB BY MOUTH DAILY    Vitamin Supplements (Adult) Protocol Passed    1/3/2018 11:48 AM       Passed - High dose Vitamin D not ordered       Passed - Recent or future visit with authorizing provider's specialty    Patient had office visit in the last year or has a visit in the next 30 days with authorizing provider.  See chart review.               mirtazapine (REMERON) 15 MG  tablet [Pharmacy Med Name: MIRTAZAPINE 15MG TABLET] 30 tablet 10    Last Written Prescription Date:  2017  Last Fill Quantity: 30 tablet,  # refills: 0   Last Office Visit with [facility], [facility] or [facility] prescribing provider:  10/31/2017   Future Office Visit:      Si TAB BY MOUTH AT BEDTIME    Atypical Antidepressants Protocol Passed    1/3/2018 11:48 AM       Passed - Patient has PHQ-9 score less than 5 in past 6 months.    The PHQ-9 criteria is meant to fail. It requires a PHQ-9 score review         Passed - Patient is age 18 or older       Passed - No active pregnancy on record       Passed - No positive pregnancy test in past 12 mos       Passed - Recent (6 mo) or future visit with authorizing provider's specialty    Patient had office visit in the last 6 months or has a visit in the next 30 days with authorizing provider.  See chart review.             losartan (COZAAR) 100 MG tablet [Pharmacy Med Name: LOSARTAN POTASSIUM F/C 100MG TABLET] 30 tablet 10    Last Written Prescription Date:  2017  Last Fill Quantity: 30 tablet,  # refills: 0   Last Office Visit with [facility], [facility] or [facility] prescribing provider:  10/31/2017   Future Office Visit:      Si TAB BY MOUTH DAILY    Angiotensin-II Receptors Passed    1/3/2018 11:48 AM       Passed - Blood pressure under 140/90 in past 12 months.    BP Readings from Last 3 Encounters:   10/31/17 115/73   06/15/17 106/67   17 91/57                Passed - Recent or future visit with authorizing provider's specialty    Patient had office visit in the last year or has a visit in the next 30 days with authorizing provider.  See chart review.              Passed - Patient is age 18 or older       Passed - No active pregnancy on record       Passed - Normal serum creatinine on file in past 12 months    Recent Labs   Lab Test  17   0825   CR  0.63            Passed - Normal serum potassium on file in past 12 months    Recent Labs   Lab Test  17   0902    POTASSIUM  3.8                   Passed - No positive pregnancy test in past 12 months

## 2018-01-05 RX ORDER — FOLIC ACID 1 MG/1
TABLET ORAL
Qty: 30 TABLET | Refills: 10 | Status: SHIPPED | OUTPATIENT
Start: 2018-01-05 | End: 2018-04-20

## 2018-01-05 RX ORDER — THIAMINE MONONITRATE (VIT B1) 100 MG
TABLET ORAL
Qty: 30 TABLET | Refills: 10 | Status: SHIPPED | OUTPATIENT
Start: 2018-01-05 | End: 2018-04-20

## 2018-01-05 RX ORDER — LOSARTAN POTASSIUM 100 MG/1
TABLET ORAL
Qty: 30 TABLET | Refills: 10 | Status: SHIPPED | OUTPATIENT
Start: 2018-01-05 | End: 2018-04-20

## 2018-01-05 RX ORDER — MIRTAZAPINE 15 MG/1
TABLET, FILM COATED ORAL
Qty: 30 TABLET | Refills: 10 | Status: SHIPPED | OUTPATIENT
Start: 2018-01-05 | End: 2018-04-20

## 2018-01-05 RX ORDER — NICOTINE POLACRILEX 4 MG/1
GUM, CHEWING ORAL
Qty: 28 TABLET | Refills: 11 | Status: SHIPPED | OUTPATIENT
Start: 2018-01-05 | End: 2020-12-21

## 2018-01-05 NOTE — TELEPHONE ENCOUNTER
Routing refill request to provider for review/approval because:  A break in medication    Mohini Amin RN  [facility]  [phone]

## 2018-01-12 DIAGNOSIS — G44.219 EPISODIC TENSION-TYPE HEADACHE, NOT INTRACTABLE: ICD-10-CM

## 2018-01-12 NOTE — TELEPHONE ENCOUNTER
"Requested Prescriptions   Pending Prescriptions Disp Refills     MAPAP 325 MG tablet [Pharmacy Med Name: ACETAMINOPHEN 325MG TABLET] 100 tablet 11    Last Written Prescription Date:  2017  Last Fill Quantity: 100 tablet,  # refills: 0   Last Office Visit with [facility], [facility] or [facility] prescribing provider:  10/31/2017   Future Office Visit:      Si TABS (650MG) BY MOUTH EVERY 6 HOURS AS NEEDED FOR PAIN - NOT TO EXCEED 4000MG APAP/24HRS    Analgesics (Non-Narcotic Tylenol and ASA Only) Passed    2018  7:38 AM       Passed - Recent or future visit with authorizing provider's specialty    Patient had office visit in the last year or has a visit in the next 30 days with authorizing provider.  See \"Patient Info\" tab in inbasket, or \"Choose Columns\" in Meds & Orders section of the refill encounter.              Passed - Patient is 7 months old or older    If patient is a peds patient of the age 7 mos -12 years, ok to refill using weight-based dosing.     If >3g daily and/or sig is not \"prn\", check for liver enzymes. If normal in the last year, ok to refill.  If not, refer to the provider.            "

## 2018-01-15 RX ORDER — ACETAMINOPHEN 325 MG/1
TABLET ORAL
Qty: 100 TABLET | Refills: 11 | Status: SHIPPED | OUTPATIENT
Start: 2018-01-15 | End: 2018-03-14

## 2018-01-15 NOTE — TELEPHONE ENCOUNTER
Refill approved through [facility] protocol.  Mohini Amin RN  [facility]  [phone]

## 2018-01-29 DIAGNOSIS — I10 HYPERTENSION GOAL BP (BLOOD PRESSURE) < 140/80: ICD-10-CM

## 2018-01-29 DIAGNOSIS — G43.009 MIGRAINE WITHOUT AURA AND WITHOUT STATUS MIGRAINOSUS, NOT INTRACTABLE: ICD-10-CM

## 2018-01-29 NOTE — TELEPHONE ENCOUNTER
"Requested Prescriptions   Pending Prescriptions Disp Refills     metoprolol tartrate (LOPRESSOR) 25 MG tablet [Pharmacy Med Name: METOPROLOL TART 25MG TABLET]  Last Written Prescription Date:  17  Last Fill Quantity: 60,  # refills: 0   Last Office Visit with [facility] provider:  10/31/17   Future Office Visit:      60 tablet 10     Si TAB BY MOUTH TWICE DAILY    Beta-Blockers Protocol Passed    2018  8:13 AM       Passed - Blood pressure under 140/90    BP Readings from Last 3 Encounters:   10/31/17 115/73   06/15/17 106/67   17 91/57                Passed - Patient is age 6 or older       Passed - Recent or future visit with authorizing provider's specialty    Patient had office visit in the last year or has a visit in the next 30 days with authorizing provider.  See \"Patient Info\" tab in inbasket, or \"Choose Columns\" in Meds & Orders section of the refill encounter.             topiramate (TOPAMAX) 25 MG tablet [Pharmacy Med Name: TOPIRAMATE F/C 25MG TABLET]  Last Written Prescription Date:  17  Last Fill Quantity: 60,  # refills: 0   Last Office Visit with [facility] provider:  10/31/17   Future Office Visit:      60 tablet 10     Si TAB BY MOUTH TWICE DAILY    Anticonvulsants Protocol  Failed    2018  8:13 AM       Failed - Review Authorizing provider's last note.     Refer to last progress notes: confirm request is for original authorizing provider (cannot be through other providers).         Failed - Normal serum creatinine on file in past 6 months    Recent Labs   Lab Test  17   0825   CR  0.63            Passed - No active pregnancy on record       Passed - No positive pregnancy test in last 12 months       Passed - Recent or future visit with authorizing provider    Patient had office visit in the last 6 months or has a visit in the next 30 days with authorizing provider.  See \"Patient Info\" tab in inbasket, or \"Choose Columns\" in Meds & Orders section of the refill encounter.     "

## 2018-01-31 NOTE — TELEPHONE ENCOUNTER
Metoprolol: Routing refill request to provider for review/approval because:  A break in medication      Topamax: Routing refill request to provider for review/approval because:  Labs out of range:  Creatinine > 6 months  Rona Khan RN - Triage  [facility]

## 2018-02-01 RX ORDER — METOPROLOL TARTRATE 25 MG/1
TABLET, FILM COATED ORAL
Qty: 60 TABLET | Refills: 10 | Status: SHIPPED | OUTPATIENT
Start: 2018-02-01 | End: 2018-04-20

## 2018-02-01 RX ORDER — TOPIRAMATE 25 MG/1
TABLET, FILM COATED ORAL
Qty: 60 TABLET | Refills: 10 | Status: SHIPPED | OUTPATIENT
Start: 2018-02-01 | End: 2018-04-20

## 2018-02-10 DIAGNOSIS — Z78.0 POSTMENOPAUSAL STATUS: Primary | ICD-10-CM

## 2018-02-12 RX ORDER — ASCORBIC ACID 500 MG
TABLET ORAL
Qty: 30 TABLET | Refills: 10 | Status: SHIPPED | OUTPATIENT
Start: 2018-02-12 | End: 2021-03-30

## 2018-02-12 NOTE — TELEPHONE ENCOUNTER
Routing refill request to provider for review/approval because:  Previously ordered by another provider.  Break in medication.  Rona Khan RN - Triage  [facility]

## 2018-02-12 NOTE — TELEPHONE ENCOUNTER
Vitamin C   Last Written Prescription Date:  5/8/17  Last Fill Quantity: 30,   # refills: 0  Last Office Visit: 10/31/17  Future Office visit:       Routing refill request to provider for review/approval because:  Drug not on the [facility], [facility] or [facility] refill protocol or controlled substance    Argelia Mckeon CMA

## 2018-03-14 ENCOUNTER — TELEPHONE (OUTPATIENT)
Dept: FAMILY MEDICINE | Facility: CLINIC | Age: 68
End: 2018-03-14

## 2018-03-14 DIAGNOSIS — G44.219 EPISODIC TENSION-TYPE HEADACHE, NOT INTRACTABLE: ICD-10-CM

## 2018-03-14 RX ORDER — ACETAMINOPHEN 325 MG/1
TABLET ORAL
Qty: 100 TABLET | Refills: 11 | Status: SHIPPED | OUTPATIENT
Start: 2018-03-14 | End: 2023-10-14

## 2018-03-14 NOTE — TELEPHONE ENCOUNTER
Argelia - RN at [facility] calling regarding patients tylenol rx. She states facility recently changed pharmacies and they need new rx sent to them. Medication ordered as requested.   Mariel Richardson RN   [facility] - Triage

## 2018-03-28 ENCOUNTER — TELEPHONE (OUTPATIENT)
Dept: FAMILY MEDICINE | Facility: CLINIC | Age: 68
End: 2018-03-28

## 2018-03-28 DIAGNOSIS — J30.2 SEASONAL ALLERGIC RHINITIS, UNSPECIFIED CHRONICITY, UNSPECIFIED TRIGGER: Primary | ICD-10-CM

## 2018-03-28 NOTE — TELEPHONE ENCOUNTER
Non detailed message left for patient to return call. RN cannot find Flonase or Fluticasone anywhere on the patient's chart.  Please ask if pt needs Nasonex which is active on pt's chart  Argelia Perrin RN  [facility] [facility]

## 2018-03-28 NOTE — TELEPHONE ENCOUNTER
Reason for Call:  Other returning call    Detailed comments: Reena returning call    Phone Number Patient can be reached at: Other phone number:  [phone]     Best Time: anytime    Can we leave a detailed message on this number? YES    Call taken on 3/28/2018 at 12:19 PM by Nohemi Baron

## 2018-03-28 NOTE — TELEPHONE ENCOUNTER
Detailed message left for Argelia brennan RN at the memory care unit at [facility] in [place] about Fluticasone or Nasonex RX. Left call back number to let us know which medication they are requesting  Argelia Perrin RN  [facility] [facility]

## 2018-03-28 NOTE — TELEPHONE ENCOUNTER
Reason for Call:  Medication or medication refill:    Do you use a [facility]?  Name of the pharmacy and phone number for the current request:  [facility]    Name of the medication requested: fluticasone 50 mcg    Other request: na    Can we leave a detailed message on this number? YES    Phone number patient can be reached at: Other phone number:  [phone]    Best Time: anytime    Call taken on 3/28/2018 at 10:43 AM by Nohemi Baron

## 2018-03-29 RX ORDER — FLUTICASONE PROPIONATE 50 MCG
1-2 SPRAY, SUSPENSION (ML) NASAL DAILY
Qty: 1 BOTTLE | Refills: 11 | Status: SHIPPED | OUTPATIENT
Start: 2018-03-29 | End: 2019-04-22

## 2018-03-29 NOTE — TELEPHONE ENCOUNTER
Spoke with RN at [facility]. They state per their records nasonex was d/c and she is to have flonase qhs. Rx pended, please review and advise.   Mariel Richardson RN   [facility] - Triage

## 2018-03-29 NOTE — TELEPHONE ENCOUNTER
Left message for Argelia - [place] to return call.   Mariel Richardson RN   [facility] - Triage

## 2018-03-31 ENCOUNTER — TELEPHONE (OUTPATIENT)
Dept: FAMILY MEDICINE | Facility: CLINIC | Age: 68
End: 2018-03-31

## 2018-03-31 NOTE — TELEPHONE ENCOUNTER
3/31/2018    Call Regarding Preventive Health Screening Mammogram    Attempt 1    Message on voicemail    Comments:       Outreach   Jaimie Beasley

## 2018-04-03 NOTE — TELEPHONE ENCOUNTER
4/3/2018      Spoke with care giver Reena. She will talk to patient about scheduling a Mammogram and call back if she wants one done.             Outreach ,  Shahbaz Trujillo

## 2018-04-20 DIAGNOSIS — K21.9 ESOPHAGEAL REFLUX: Primary | ICD-10-CM

## 2018-04-20 DIAGNOSIS — J30.1 ACUTE SEASONAL ALLERGIC RHINITIS DUE TO POLLEN: ICD-10-CM

## 2018-04-20 DIAGNOSIS — I10 HYPERTENSION GOAL BP (BLOOD PRESSURE) < 140/80: ICD-10-CM

## 2018-04-20 DIAGNOSIS — F33.2 SEVERE RECURRENT MAJOR DEPRESSION WITHOUT PSYCHOTIC FEATURES (H): ICD-10-CM

## 2018-04-20 DIAGNOSIS — G43.009 MIGRAINE WITHOUT AURA AND WITHOUT STATUS MIGRAINOSUS, NOT INTRACTABLE: ICD-10-CM

## 2018-04-23 RX ORDER — MONTELUKAST SODIUM 10 MG/1
TABLET ORAL
Qty: 30 TABLET | Refills: 1 | Status: SHIPPED | OUTPATIENT
Start: 2018-04-23 | End: 2018-06-19

## 2018-04-23 RX ORDER — METOPROLOL TARTRATE 25 MG/1
TABLET, FILM COATED ORAL
Qty: 60 TABLET | Refills: 7 | Status: SHIPPED | OUTPATIENT
Start: 2018-04-23

## 2018-04-23 RX ORDER — MIRTAZAPINE 15 MG/1
TABLET, FILM COATED ORAL
Qty: 30 TABLET | Refills: 7 | Status: SHIPPED | OUTPATIENT
Start: 2018-04-23

## 2018-04-23 RX ORDER — FOLIC ACID 1 MG/1
TABLET ORAL
Qty: 30 TABLET | Refills: 7 | Status: SHIPPED | OUTPATIENT
Start: 2018-04-23

## 2018-04-23 RX ORDER — TOPIRAMATE 25 MG/1
TABLET, FILM COATED ORAL
Qty: 60 TABLET | Refills: 7 | Status: SHIPPED | OUTPATIENT
Start: 2018-04-23

## 2018-04-23 RX ORDER — THIAMINE MONONITRATE (VIT B1) 100 MG
TABLET ORAL
Qty: 30 TABLET | Refills: 7 | Status: SHIPPED | OUTPATIENT
Start: 2018-04-23

## 2018-04-23 RX ORDER — LOSARTAN POTASSIUM 100 MG/1
TABLET ORAL
Qty: 30 TABLET | Refills: 7 | Status: SHIPPED | OUTPATIENT
Start: 2018-04-23

## 2018-04-23 NOTE — TELEPHONE ENCOUNTER
"Requested Prescriptions   Pending Prescriptions Disp Refills     MAPAP 500 MG tablet [Pharmacy Med Name: MAPAP 500MG TAB] 60 tablet      Sig: TAKE 1 TABLET BY MOUTH TWICE DAILY FOR HEADACHE PREVENTION    Analgesics (Non-Narcotic Tylenol and ASA Only) Passed    4/20/2018  3:23 PM       Passed - Recent (12 mo) or future (30 days) visit within the authorizing provider's specialty    Patient had office visit in the last 12 months or has a visit in the next 30 days with authorizing provider or within the authorizing provider's specialty.  See \"Patient Info\" tab in inbasket, or \"Choose Columns\" in Meds & Orders section of the refill encounter.           Passed - Patient is 7 months old or older    If patient is a peds patient of the age 7 mos -12 years, ok to refill using weight-based dosing.     If >3g daily and/or sig is not \"prn\", check for liver enzymes. If normal in the last year, ok to refill.  If not, refer to the provider.     Refused. Rx was already sent to the [facility]. Rachel Garvey RN     mirtazapine (REMERON) 15 MG tablet [Pharmacy Med Name: MIRTAZAPINE 15MG TAB] 1 tablet      Sig: TAKE 1 TAB BY MOUTH AT BEDTIME    Atypical Antidepressants Protocol Passed    4/20/2018  3:23 PM       Passed - Patient has PHQ-9 score less than 5 in past 6 months.    Please review last PHQ-9 score.          Passed - Patient is age 18 or older       Passed - No active pregnancy on record       Passed - No positive pregnancy test in past 12 mos       Passed - Recent (6 mo) or future (30 days) visit within the authorizing provider's specialty    Patient had office visit in the last 6 months or has a visit in the next 30 days with authorizing provider or within the authorizing provider's specialty.  See \"Patient Info\" tab in inbasket, or \"Choose Columns\" in Meds & Orders section of the refill encounter.       Last Written Prescription Date:  1/5/18  Last Fill Quantity: 30,  # refills: 10   Last office visit: 10/31/2017 " "with prescribing provider:  10/31/17   Future Office Visit:       VITAMIN B-1 100 MG tablet [Pharmacy Med Name: VITAMIN B-1 100 MG TABLET] 30 tablet      Sig: TAKE 1 TAB BY MOUTH ONCE DAILY    There is no refill protocol information for this order   Remaining refills transferred to patient's new pharmacy.      topiramate (TOPAMAX) 25 MG tablet [Pharmacy Med Name: TOPIRAMATE 25MG TAB] 1 tablet      Sig: TAKE 1 TAB BY MOUTH TWICE A DAY    Anti-Seizure Meds Protocol  Failed    4/20/2018  3:23 PM       Failed - Review Authorizing provider's last note.     Refer to last progress notes: confirm request is for original authorizing provider (cannot be through other providers).         Failed - Normal CBC on file in past 26 months    Recent Labs   Lab Test  05/01/17   1338   WBC  10.5   RBC  3.59*   HGB  13.0   HCT  38.2   PLT  149*            Failed - Normal ALT or AST on file in past 26 months    Recent Labs   Lab Test  05/02/17   0825   ALT  81*     Recent Labs   Lab Test  05/02/17   0825   AST  152*            Failed - Normal platelet count on file in past 26 months    Recent Labs   Lab Test  05/01/17   1338   PLT  149*              Passed - Recent (12 mo) or future (30 days) visit within the authorizing provider's specialty    Patient had office visit in the last 12 months or has a visit in the next 30 days with authorizing provider or within the authorizing provider's specialty.  See \"Patient Info\" tab in inbasket, or \"Choose Columns\" in Meds & Orders section of the refill encounter.           Passed - Normal serum creatinine on file in past 26 months    Recent Labs   Lab Test  05/02/17   0825   CR  0.63            Passed - No active pregnancy on record       Passed - No positive pregnancy test in last 12 months   Last Written Prescription Date:  2/1/18  Last Fill Quantity: 60,  # refills: 10   Last office visit: 10/31/2017 with prescribing provider:  10/31/17   Future Office Visit:   Prescription approved per [facility] " "Refill Protocol. Transferred to [facility] pharmacy.   Rachel Garvey RN         omeprazole (PRILOSEC) 20 MG CR capsule [Pharmacy Med Name: OMEPRAZOLE 20MG DR CAP] 1 capsule      Sig: TAKE 1 CAP BY MOUTH ONCE DAILY    PPI Protocol Passed    4/20/2018  3:23 PM       Passed - Not on Clopidogrel (unless Pantoprazole ordered)       Passed - No diagnosis of osteoporosis on record       Passed - Recent (12 mo) or future (30 days) visit within the authorizing provider's specialty    Patient had office visit in the last 12 months or has a visit in the next 30 days with authorizing provider or within the authorizing provider's specialty.  See \"Patient Info\" tab in inbasket, or \"Choose Columns\" in Meds & Orders section of the refill encounter.           Passed - Patient is age 18 or older       Passed - No active pregnacy on record       Passed - No positive pregnancy test in past 12 months   Last Written Prescription Date:  1/5/18  Last Fill Quantity: 28,  # refills: 11   Last office visit: 10/31/2017 with prescribing provider:  10/31/17   Future Office Visit:    Prescription approved per [facility] Refill Protocol. Prescriptions transferred to [facility]. Rachel Garvey RN         montelukast (SINGULAIR) 10 MG tablet [Pharmacy Med Name: MONTELUKAST 10MG TABLET] 1 tablet      Sig: TAKE 1 TAB BY MOUTH AT BEDTIME    Leukotriene Inhibitors Protocol Passed    4/20/2018  3:23 PM       Passed - Patient is age 12 or older    If patient is under 16, ok to refill using age based dosing.          Passed - Recent (12 mo) or future (30 days) visit within the authorizing provider's specialty    Patient had office visit in the last 12 months or has a visit in the next 30 days with authorizing provider or within the authorizing provider's specialty.  See \"Patient Info\" tab in inbasket, or \"Choose Columns\" in Meds & Orders section of the refill encounter.       Last Written Prescription Date:  1/8/18  Last Fill Quantity: 30,  # refills: 1   Last office " "visit: 10/31/2017 with prescribing provider:  10/31/17   Future Office Visit:   Prescription approved per [facility] Refill Protocol.   Rachel Garvey RN         metoprolol tartrate (LOPRESSOR) 25 MG tablet [Pharmacy Med Name: METOPROLOL TARTRATE 25MG TAB] 1 tablet      Sig: TAKE 1 TAB BY MOUTH TWICE A DAY    Beta-Blockers Protocol Passed    4/20/2018  3:23 PM       Passed - Blood pressure under 140/90 in past 12 months    BP Readings from Last 3 Encounters:   10/31/17 115/73   06/15/17 106/67   06/06/17 91/57                Passed - Patient is age 6 or older       Passed - Recent (12 mo) or future (30 days) visit within the authorizing provider's specialty    Patient had office visit in the last 12 months or has a visit in the next 30 days with authorizing provider or within the authorizing provider's specialty.  See \"Patient Info\" tab in inbasket, or \"Choose Columns\" in Meds & Orders section of the refill encounter.       Last Written Prescription Date:  2/1/18  Last Fill Quantity: 60,  # refills: 10   Last office visit: 10/31/2017 with prescribing provider:  10/31/17   Future Office Visit:    Prescription approved per [facility] Refill Protocol.  Rachel Garvey RN         losartan (COZAAR) 100 MG tablet [Pharmacy Med Name: LOSARTAN 100MG TABLET] 1 tablet      Sig: TAKE 1 TAB BY MOUTH ONCE DAILY    Angiotensin-II Receptors Passed    4/20/2018  3:23 PM       Passed - Blood pressure under 140/90 in past 12 months    BP Readings from Last 3 Encounters:   10/31/17 115/73   06/15/17 106/67   06/06/17 91/57                Passed - Recent (12 mo) or future (30 days) visit within the authorizing provider's specialty    Patient had office visit in the last 12 months or has a visit in the next 30 days with authorizing provider or within the authorizing provider's specialty.  See \"Patient Info\" tab in inbasket, or \"Choose Columns\" in Meds & Orders section of the refill encounter.           Passed - Patient is age 18 or older       Passed " "- No active pregnancy on record       Passed - Normal serum creatinine on file in past 12 months    Recent Labs   Lab Test  05/02/17   0825   CR  0.63            Passed - Normal serum potassium on file in past 12 months    Recent Labs   Lab Test  05/03/17   0902   POTASSIUM  3.8                   Passed - No positive pregnancy test in past 12 months   Last Written Prescription Date:  1/5/18  Last Fill Quantity: 30,  # refills: 10   Last office visit: 10/31/2017 with prescribing provider:  10/31/17   Future Office Visit:    Remaining refills transferred.  Rachel Garvey RN         folic acid (FOLVITE) 1 MG tablet [Pharmacy Med Name: FOLIC ACID 1MG TAB] 1 tablet      Sig: TAKE 1 TAB BY MOUTH ONCE DAILY    Vitamin Supplements (Adult) Protocol Passed    4/20/2018  3:23 PM       Passed - High dose Vitamin D not ordered       Passed - Recent (12 mo) or future (30 days) visit within the authorizing provider's specialty    Patient had office visit in the last 12 months or has a visit in the next 30 days with authorizing provider or within the authorizing provider's specialty.  See \"Patient Info\" tab in inbasket, or \"Choose Columns\" in Meds & Orders section of the refill encounter.          Remaining refills sent to [facility] after confirming with patient's POA and rep on Consent [employer] Communicate confirmed that rx needed to be transferred.   Rachel Garvey RN    "

## 2018-05-03 ENCOUNTER — RECORDS - HEALTHEAST (OUTPATIENT)
Dept: LAB | Facility: CLINIC | Age: 68
End: 2018-05-03

## 2018-05-03 LAB
ALBUMIN SERPL-MCNC: 3.2 G/DL (ref 3.5–5)
ALP SERPL-CCNC: 141 U/L (ref 45–120)
ALT SERPL W P-5'-P-CCNC: <9 U/L (ref 0–45)
AST SERPL W P-5'-P-CCNC: 13 U/L (ref 0–40)
BILIRUB DIRECT SERPL-MCNC: 0.2 MG/DL
BILIRUB SERPL-MCNC: 0.4 MG/DL (ref 0–1)
PROT SERPL-MCNC: 7.2 G/DL (ref 6–8)

## 2018-05-04 LAB — HCV AB SERPL QL IA: NEGATIVE

## 2018-06-03 DIAGNOSIS — J30.1 ALLERGIC RHINITIS DUE TO POLLEN, UNSPECIFIED CHRONICITY, UNSPECIFIED SEASONALITY: ICD-10-CM

## 2018-06-04 NOTE — TELEPHONE ENCOUNTER
"Requested Prescriptions   Pending Prescriptions Disp Refills     azelastine (ASTELIN) 0.1 % spray [Pharmacy Med Name: AZELASTINE HCL 0.1% NASAL SOLN] 30 mL     Last Written Prescription Date:  1/25/2018  Last Fill Quantity: 30mL,  # refills: 10   Last office visit: 10/31/2017 with pr/escribing provider:  Yes   Future Office Visit:     Sig: INHALE 2 SPRAYS INTO EACH NOSTRIL ONCE DAILY    Antihistamines Protocol Failed    6/3/2018  7:55 AM       Failed - Patient is 3-64 years of age    Apply weight-based dosing for peds patients age 3 - 12 years of age.    Forward request to provider for patients under the age of 3 or over the age of 64.         Passed - Recent (12 mo) or future (30 days) visit within the authorizing provider's specialty    Patient had office visit in the last 12 months or has a visit in the next 30 days with authorizing provider or within the authorizing provider's specialty.  See \"Patient Info\" tab in inbasket, or \"Choose Columns\" in Meds & Orders section of the refill encounter.              "

## 2018-06-05 RX ORDER — AZELASTINE 1 MG/ML
SPRAY, METERED NASAL
Qty: 30 ML | Refills: 0 | Status: SHIPPED | OUTPATIENT
Start: 2018-06-05 | End: 2018-11-14

## 2018-06-05 NOTE — TELEPHONE ENCOUNTER
Routing refill request to provider for review/approval because:  Failed age requirements for refill protocol.  Mohini AminRN BSN  [facility]  [phone]

## 2018-06-19 DIAGNOSIS — J30.1 ACUTE SEASONAL ALLERGIC RHINITIS DUE TO POLLEN: ICD-10-CM

## 2018-06-19 RX ORDER — MONTELUKAST SODIUM 10 MG/1
TABLET ORAL
Qty: 30 TABLET | Refills: 4 | Status: SHIPPED | OUTPATIENT
Start: 2018-06-19

## 2018-06-19 NOTE — TELEPHONE ENCOUNTER
"Requested Prescriptions   Pending Prescriptions Disp Refills     montelukast (SINGULAIR) 10 MG tablet [Pharmacy Med Name: MONTELUKAST 10MG TABLET] 30 tablet     Last Written Prescription Date:  4/23/18  Last Fill Quantity: 30,  # refills: 1   Last office visit: 10/31/2017 with prescribing provider:  NO   Future Office Visit:     Sig: TAKE 1 TAB BY MOUTH AT BEDTIME    Leukotriene Inhibitors Protocol Passed    6/19/2018 12:26 PM       Passed - Patient is age 12 or older    If patient is under 16, ok to refill using age based dosing.          Passed - Recent (12 mo) or future (30 days) visit within the authorizing provider's specialty    Patient had office visit in the last 12 months or has a visit in the next 30 days with authorizing provider or within the authorizing provider's specialty.  See \"Patient Info\" tab in inbasket, or \"Choose Columns\" in Meds & Orders section of the refill encounter.              "

## 2018-06-19 NOTE — TELEPHONE ENCOUNTER
Prescription approved per [facility] Refill Protocol.  Mariel Richardson RN   [facility] - Triage

## 2019-01-03 ENCOUNTER — RECORDS - HEALTHEAST (OUTPATIENT)
Dept: LAB | Facility: CLINIC | Age: 69
End: 2019-01-03

## 2019-01-03 LAB
ALBUMIN SERPL-MCNC: 3.3 G/DL (ref 3.5–5)
ALP SERPL-CCNC: 128 U/L (ref 45–120)
ALT SERPL W P-5'-P-CCNC: 12 U/L (ref 0–45)
ANION GAP SERPL CALCULATED.3IONS-SCNC: 8 MMOL/L (ref 5–18)
AST SERPL W P-5'-P-CCNC: 13 U/L (ref 0–40)
BILIRUB SERPL-MCNC: 0.4 MG/DL (ref 0–1)
BUN SERPL-MCNC: 12 MG/DL (ref 8–22)
CALCIUM SERPL-MCNC: 9.3 MG/DL (ref 8.5–10.5)
CHLORIDE BLD-SCNC: 100 MMOL/L (ref 98–107)
CO2 SERPL-SCNC: 25 MMOL/L (ref 22–31)
CREAT SERPL-MCNC: 0.84 MG/DL (ref 0.6–1.1)
ERYTHROCYTE [DISTWIDTH] IN BLOOD BY AUTOMATED COUNT: 12.8 % (ref 11–14.5)
GFR SERPL CREATININE-BSD FRML MDRD: >60 ML/MIN/1.73M2
GLUCOSE BLD-MCNC: 107 MG/DL (ref 70–125)
HCT VFR BLD AUTO: 38.7 % (ref 35–47)
HGB BLD-MCNC: 12.7 G/DL (ref 12–16)
MCH RBC QN AUTO: 30.2 PG (ref 27–34)
MCHC RBC AUTO-ENTMCNC: 32.8 G/DL (ref 32–36)
MCV RBC AUTO: 92 FL (ref 80–100)
PLATELET # BLD AUTO: 367 THOU/UL (ref 140–440)
PMV BLD AUTO: 8.4 FL (ref 8.5–12.5)
POTASSIUM BLD-SCNC: 4.1 MMOL/L (ref 3.5–5)
PROT SERPL-MCNC: 7.4 G/DL (ref 6–8)
RBC # BLD AUTO: 4.2 MILL/UL (ref 3.8–5.4)
SODIUM SERPL-SCNC: 133 MMOL/L (ref 136–145)
TSH SERPL DL<=0.005 MIU/L-ACNC: 5.78 UIU/ML (ref 0.3–5)
WBC: 9.3 THOU/UL (ref 4–11)

## 2019-01-09 ENCOUNTER — RECORDS - HEALTHEAST (OUTPATIENT)
Dept: LAB | Facility: CLINIC | Age: 69
End: 2019-01-09

## 2019-01-09 LAB
T3 SERPL-MCNC: 73 NG/DL (ref 45–175)
T4 TOTAL - HISTORICAL: 6.7 UG/DL (ref 4.5–13)

## 2019-03-28 ENCOUNTER — RECORDS - HEALTHEAST (OUTPATIENT)
Dept: LAB | Facility: CLINIC | Age: 69
End: 2019-03-28

## 2019-03-28 LAB — TSH SERPL DL<=0.005 MIU/L-ACNC: 6.9 UIU/ML (ref 0.3–5)

## 2019-04-22 DIAGNOSIS — J30.2 SEASONAL ALLERGIC RHINITIS: ICD-10-CM

## 2019-04-22 DIAGNOSIS — J30.2 SEASONAL ALLERGIC RHINITIS, UNSPECIFIED TRIGGER: Primary | ICD-10-CM

## 2019-04-22 RX ORDER — FLUTICASONE PROPIONATE 50 MCG
SPRAY, SUSPENSION (ML) NASAL
Qty: 16 G | Refills: 1 | Status: SHIPPED | OUTPATIENT
Start: 2019-04-22 | End: 2019-06-07

## 2019-04-22 NOTE — TELEPHONE ENCOUNTER
"Requested Prescriptions   Pending Prescriptions Disp Refills     fluticasone (FLONASE) 50 MCG/ACT nasal spray [Pharmacy Med Name: FLUTICASONE 50MCG/ACT SPRAY] 16 g      Sig: INHALE 1-2 SPRAYS INTO BOTH NOSTRILS ONCE DAILY       Inhaled Steroids Protocol Failed - 4/22/2019 11:52 AM        Failed - Recent (12 mo) or future (30 days) visit within the authorizing provider's specialty     Patient had office visit in the last 12 months or has a visit in the next 30 days with authorizing provider or within the authorizing provider's specialty.  See \"Patient Info\" tab in inbasket, or \"Choose Columns\" in Meds & Orders section of the refill encounter.              Passed - Patient is age 12 or older        Passed - Medication is active on med list        Last Written Prescription Date:  3/29/18  Last Fill Quantity: 1,  # refills: 11   Last office visit: 10/31/2017 with prescribing provider:  Thuan Banda PA-C   Future Office Visit:     Routing refill request to provider for review/approval because:  Patient needs to be seen because it has been more than 1 year since last office visit.  Rachel Garvey RN             "

## 2019-06-27 ENCOUNTER — RECORDS - HEALTHEAST (OUTPATIENT)
Dept: LAB | Facility: CLINIC | Age: 69
End: 2019-06-27

## 2019-06-27 LAB
ALBUMIN SERPL-MCNC: 3.2 G/DL (ref 3.5–5)
ALP SERPL-CCNC: 114 U/L (ref 45–120)
ALT SERPL W P-5'-P-CCNC: 11 U/L (ref 0–45)
ANION GAP SERPL CALCULATED.3IONS-SCNC: 8 MMOL/L (ref 5–18)
AST SERPL W P-5'-P-CCNC: 12 U/L (ref 0–40)
BILIRUB SERPL-MCNC: 0.3 MG/DL (ref 0–1)
BUN SERPL-MCNC: 11 MG/DL (ref 8–22)
CALCIUM SERPL-MCNC: 9.3 MG/DL (ref 8.5–10.5)
CHLORIDE BLD-SCNC: 104 MMOL/L (ref 98–107)
CO2 SERPL-SCNC: 23 MMOL/L (ref 22–31)
CREAT SERPL-MCNC: 0.79 MG/DL (ref 0.6–1.1)
ERYTHROCYTE [DISTWIDTH] IN BLOOD BY AUTOMATED COUNT: 13.3 % (ref 11–14.5)
GFR SERPL CREATININE-BSD FRML MDRD: >60 ML/MIN/1.73M2
GLUCOSE BLD-MCNC: 95 MG/DL (ref 70–125)
HCT VFR BLD AUTO: 37.1 % (ref 35–47)
HGB BLD-MCNC: 12.2 G/DL (ref 12–16)
MCH RBC QN AUTO: 29.8 PG (ref 27–34)
MCHC RBC AUTO-ENTMCNC: 32.9 G/DL (ref 32–36)
MCV RBC AUTO: 91 FL (ref 80–100)
PLATELET # BLD AUTO: 309 THOU/UL (ref 140–440)
PMV BLD AUTO: 8.2 FL (ref 8.5–12.5)
POTASSIUM BLD-SCNC: 4.2 MMOL/L (ref 3.5–5)
PROT SERPL-MCNC: 6.9 G/DL (ref 6–8)
RBC # BLD AUTO: 4.09 MILL/UL (ref 3.8–5.4)
SODIUM SERPL-SCNC: 135 MMOL/L (ref 136–145)
TSH SERPL DL<=0.005 MIU/L-ACNC: 4.67 UIU/ML (ref 0.3–5)
WBC: 7.8 THOU/UL (ref 4–11)

## 2019-07-17 DIAGNOSIS — K21.9 ESOPHAGEAL REFLUX: ICD-10-CM

## 2019-07-17 DIAGNOSIS — F33.2 SEVERE RECURRENT MAJOR DEPRESSION WITHOUT PSYCHOTIC FEATURES (H): ICD-10-CM

## 2019-07-17 DIAGNOSIS — I10 HYPERTENSION GOAL BP (BLOOD PRESSURE) < 140/80: ICD-10-CM

## 2019-07-17 NOTE — TELEPHONE ENCOUNTER
"Requested Prescriptions   Pending Prescriptions Disp Refills     mirtazapine (REMERON) 15 MG tablet [Pharmacy Med Name: MIRTAZAPINE 15MG TABLET]  Last Written Prescription Date:  04/23/2018  Last Fill Quantity: 30 tablet,  # refills: 7   Last Office Visit: 10/31/2017 Thuan Banda PA-C   Future Office Visit:      30 tablet 7     Sig: TAKE 1 TAB BY MOUTH AT BEDTIME       Atypical Antidepressants Protocol Failed - 7/17/2019  2:05 PM        Failed - Patient has PHQ-9 score less than 5 in past 6 months.     Please review last PHQ-9 score.   PHQ-9 SCORE 10/31/2017   PHQ-9 Total Score 0     REID-7 SCORE 10/31/2017   Total Score 0             Failed - Recent (6 mo) or future (30 days) visit within the authorizing provider's specialty     Patient had office visit in the last 6 months or has a visit in the next 30 days with authorizing provider or within the authorizing provider's specialty.  See \"Patient Info\" tab in inbasket, or \"Choose Columns\" in Meds & Orders section of the refill encounter.            Passed - Medication active on med list        Passed - Patient is age 18 or older        Passed - No active pregnancy on record        Passed - No positive pregnancy test in past 12 mos        omeprazole (PRILOSEC) 20 MG DR capsule [Pharmacy Med Name: OMEPRAZOLE 20MG DR CAPSULE]  Last Written Prescription Date:  04/23/2018  Last Fill Quantity: 30 capsule,  # refills: 7   Last Office Visit: 10/31/2017 Thuan Banda PA-C Future Office Visit:      30 capsule 7     Sig: TAKE 1 CAP BY MOUTH ONCE DAILY       PPI Protocol Failed - 7/17/2019  2:05 PM        Failed - Recent (12 mo) or future (30 days) visit within the authorizing provider's specialty     Patient had office visit in the last 12 months or has a visit in the next 30 days with authorizing provider or within the authorizing provider's specialty.  See \"Patient Info\" tab in inbasket, or \"Choose Columns\" in Meds & Orders section of the refill encounter. " "             Passed - Not on Clopidogrel (unless Pantoprazole ordered)        Passed - No diagnosis of osteoporosis on record        Passed - Medication is active on med list        Passed - Patient is age 18 or older        Passed - No active pregnacy on record        Passed - No positive pregnancy test in past 12 months        metoprolol tartrate (LOPRESSOR) 25 MG tablet [Pharmacy Med Name: METOPROLOL TARTRATE 25MG TAB]  Last Written Prescription Date:  04/23/2018  Last Fill Quantity: 60 tablet,  # refills: 7   Last Office Visit: 10/31/2017 Thuan Banda PA-C   Future Office Visit:      60 tablet 7     Sig: TAKE 1 TAB BY MOUTH TWICE A DAY       Beta-Blockers Protocol Failed - 7/17/2019  2:05 PM        Failed - Blood pressure under 140/90 in past 12 months     BP Readings from Last 3 Encounters:   10/31/17 115/73   06/15/17 106/67   06/06/17 91/57                 Failed - Recent (12 mo) or future (30 days) visit within the authorizing provider's specialty     Patient had office visit in the last 12 months or has a visit in the next 30 days with authorizing provider or within the authorizing provider's specialty.  See \"Patient Info\" tab in inbasket, or \"Choose Columns\" in Meds & Orders section of the refill encounter.              Passed - Patient is age 6 or older        Passed - Medication is active on med list          "

## 2019-07-18 NOTE — TELEPHONE ENCOUNTER
Patient has not been seen since 10/2017. Per other refill encounters patient needs OV. Routing to team to inform and assist in scheduling.   Mariel Richardson RN   [facility] - Triage

## 2019-07-19 NOTE — TELEPHONE ENCOUNTER
Pt now see's Dr. Andrade with [facility] and will no longer be coming to EP for OV. Please close refill request.    Reena TA will call the provider group to get her RX from correct provider.

## 2019-07-22 RX ORDER — MIRTAZAPINE 15 MG/1
TABLET, FILM COATED ORAL
Qty: 30 TABLET | Refills: 7 | OUTPATIENT
Start: 2019-07-22

## 2019-07-22 RX ORDER — METOPROLOL TARTRATE 25 MG/1
TABLET, FILM COATED ORAL
Qty: 60 TABLET | Refills: 7 | OUTPATIENT
Start: 2019-07-22

## 2019-08-29 ENCOUNTER — RECORDS - HEALTHEAST (OUTPATIENT)
Dept: LAB | Facility: CLINIC | Age: 69
End: 2019-08-29

## 2019-08-29 LAB — TSH SERPL DL<=0.005 MIU/L-ACNC: 4.67 UIU/ML (ref 0.3–5)

## 2019-12-09 ENCOUNTER — HEALTH MAINTENANCE LETTER (OUTPATIENT)
Age: 69
End: 2019-12-09

## 2020-02-06 ENCOUNTER — RECORDS - HEALTHEAST (OUTPATIENT)
Dept: LAB | Facility: CLINIC | Age: 70
End: 2020-02-06

## 2020-02-06 LAB
ALBUMIN SERPL-MCNC: 3.3 G/DL (ref 3.5–5)
ALP SERPL-CCNC: 123 U/L (ref 45–120)
ALT SERPL W P-5'-P-CCNC: 17 U/L (ref 0–45)
ANION GAP SERPL CALCULATED.3IONS-SCNC: 11 MMOL/L (ref 5–18)
AST SERPL W P-5'-P-CCNC: 13 U/L (ref 0–40)
BILIRUB SERPL-MCNC: 0.2 MG/DL (ref 0–1)
BUN SERPL-MCNC: 12 MG/DL (ref 8–22)
CALCIUM SERPL-MCNC: 8.8 MG/DL (ref 8.5–10.5)
CHLORIDE BLD-SCNC: 101 MMOL/L (ref 98–107)
CO2 SERPL-SCNC: 19 MMOL/L (ref 22–31)
CREAT SERPL-MCNC: 0.87 MG/DL (ref 0.6–1.1)
ERYTHROCYTE [DISTWIDTH] IN BLOOD BY AUTOMATED COUNT: 13.2 % (ref 11–14.5)
GFR SERPL CREATININE-BSD FRML MDRD: >60 ML/MIN/1.73M2
GLUCOSE BLD-MCNC: 98 MG/DL (ref 70–125)
HCT VFR BLD AUTO: 38.1 % (ref 35–47)
HGB BLD-MCNC: 12.5 G/DL (ref 12–16)
MCH RBC QN AUTO: 30.3 PG (ref 27–34)
MCHC RBC AUTO-ENTMCNC: 32.8 G/DL (ref 32–36)
MCV RBC AUTO: 92 FL (ref 80–100)
PLATELET # BLD AUTO: 380 THOU/UL (ref 140–440)
PMV BLD AUTO: 8.4 FL (ref 8.5–12.5)
POTASSIUM BLD-SCNC: 4.4 MMOL/L (ref 3.5–5)
PROT SERPL-MCNC: 7.3 G/DL (ref 6–8)
RBC # BLD AUTO: 4.13 MILL/UL (ref 3.8–5.4)
SODIUM SERPL-SCNC: 131 MMOL/L (ref 136–145)
TSH SERPL DL<=0.005 MIU/L-ACNC: 6.27 UIU/ML (ref 0.3–5)
WBC: 9.7 THOU/UL (ref 4–11)

## 2020-03-15 ENCOUNTER — HEALTH MAINTENANCE LETTER (OUTPATIENT)
Age: 70
End: 2020-03-15

## 2020-03-16 ENCOUNTER — HOSPITAL ENCOUNTER (EMERGENCY)
Facility: CLINIC | Age: 70
Discharge: HOME | End: 2020-03-16
Attending: EMERGENCY MEDICINE | Admitting: EMERGENCY MEDICINE
Payer: COMMERCIAL

## 2020-03-16 ENCOUNTER — APPOINTMENT (OUTPATIENT)
Dept: GENERAL RADIOLOGY | Facility: CLINIC | Age: 70
End: 2020-03-16
Attending: EMERGENCY MEDICINE
Payer: COMMERCIAL

## 2020-03-16 VITALS
OXYGEN SATURATION: 98 % | BODY MASS INDEX: 39.68 KG/M2 | RESPIRATION RATE: 13 BRPM | HEART RATE: 82 BPM | TEMPERATURE: 98.4 F | HEIGHT: 72 IN | SYSTOLIC BLOOD PRESSURE: 138 MMHG | DIASTOLIC BLOOD PRESSURE: 83 MMHG | WEIGHT: 293 LBS

## 2020-03-16 DIAGNOSIS — R42 DIZZINESS: ICD-10-CM

## 2020-03-16 LAB
ALBUMIN SERPL-MCNC: 3.1 G/DL (ref 3.4–5)
ALP SERPL-CCNC: 129 U/L (ref 40–150)
ALT SERPL W P-5'-P-CCNC: 20 U/L (ref 0–50)
ANION GAP SERPL CALCULATED.3IONS-SCNC: 6 MMOL/L (ref 3–14)
AST SERPL W P-5'-P-CCNC: 11 U/L (ref 0–45)
BASOPHILS # BLD AUTO: 0 10E9/L (ref 0–0.2)
BASOPHILS NFR BLD AUTO: 0.3 %
BILIRUB SERPL-MCNC: 0.3 MG/DL (ref 0.2–1.3)
BUN SERPL-MCNC: 11 MG/DL (ref 7–30)
CALCIUM SERPL-MCNC: 9 MG/DL (ref 8.5–10.1)
CHLORIDE SERPL-SCNC: 105 MMOL/L (ref 94–109)
CO2 SERPL-SCNC: 22 MMOL/L (ref 20–32)
CREAT SERPL-MCNC: 0.79 MG/DL (ref 0.52–1.04)
DIFFERENTIAL METHOD BLD: ABNORMAL
EOSINOPHIL # BLD AUTO: 0.3 10E9/L (ref 0–0.7)
EOSINOPHIL NFR BLD AUTO: 2.9 %
ERYTHROCYTE [DISTWIDTH] IN BLOOD BY AUTOMATED COUNT: 13.1 % (ref 10–15)
GFR SERPL CREATININE-BSD FRML MDRD: 76 ML/MIN/{1.73_M2}
GLUCOSE SERPL-MCNC: 98 MG/DL (ref 70–99)
HCT VFR BLD AUTO: 37.7 % (ref 35–47)
HGB BLD-MCNC: 12.5 G/DL (ref 11.7–15.7)
IMM GRANULOCYTES # BLD: 0.1 10E9/L (ref 0–0.4)
IMM GRANULOCYTES NFR BLD: 0.5 %
INTERPRETATION ECG - MUSE: NORMAL
LYMPHOCYTES # BLD AUTO: 1.9 10E9/L (ref 0.8–5.3)
LYMPHOCYTES NFR BLD AUTO: 19 %
MCH RBC QN AUTO: 29.8 PG (ref 26.5–33)
MCHC RBC AUTO-ENTMCNC: 33.2 G/DL (ref 31.5–36.5)
MCV RBC AUTO: 90 FL (ref 78–100)
MONOCYTES # BLD AUTO: 1.4 10E9/L (ref 0–1.3)
MONOCYTES NFR BLD AUTO: 14 %
NEUTROPHILS # BLD AUTO: 6.4 10E9/L (ref 1.6–8.3)
NEUTROPHILS NFR BLD AUTO: 63.3 %
PLATELET # BLD AUTO: 407 10E9/L (ref 150–450)
POTASSIUM SERPL-SCNC: 4.1 MMOL/L (ref 3.4–5.3)
PROT SERPL-MCNC: 7.6 G/DL (ref 6.8–8.8)
RBC # BLD AUTO: 4.19 10E12/L (ref 3.8–5.2)
SODIUM SERPL-SCNC: 133 MMOL/L (ref 133–144)
TROPONIN I SERPL-MCNC: <0.015 UG/L (ref 0–0.04)
VALPROATE SERPL-MCNC: 31 MG/L (ref 50–100)
WBC # BLD AUTO: 10.1 10E9/L (ref 4–11)

## 2020-03-16 PROCEDURE — 99285 EMERGENCY DEPT VISIT HI MDM: CPT | Mod: 25

## 2020-03-16 PROCEDURE — 25000132 ZZH RX MED GY IP 250 OP 250 PS 637: Performed by: EMERGENCY MEDICINE

## 2020-03-16 PROCEDURE — 80164 ASSAY DIPROPYLACETIC ACD TOT: CPT | Performed by: EMERGENCY MEDICINE

## 2020-03-16 PROCEDURE — 84484 ASSAY OF TROPONIN QUANT: CPT | Performed by: EMERGENCY MEDICINE

## 2020-03-16 PROCEDURE — 85025 COMPLETE CBC W/AUTO DIFF WBC: CPT | Performed by: EMERGENCY MEDICINE

## 2020-03-16 PROCEDURE — 93005 ELECTROCARDIOGRAM TRACING: CPT

## 2020-03-16 PROCEDURE — 80053 COMPREHEN METABOLIC PANEL: CPT | Performed by: EMERGENCY MEDICINE

## 2020-03-16 PROCEDURE — 71046 X-RAY EXAM CHEST 2 VIEWS: CPT

## 2020-03-16 RX ORDER — MECLIZINE HYDROCHLORIDE 25 MG/1
25 TABLET ORAL 3 TIMES DAILY PRN
Qty: 30 TABLET | Refills: 0 | Status: SHIPPED | OUTPATIENT
Start: 2020-03-16

## 2020-03-16 RX ORDER — MECLIZINE HYDROCHLORIDE 25 MG/1
25 TABLET ORAL ONCE
Status: COMPLETED | OUTPATIENT
Start: 2020-03-16 | End: 2020-03-16

## 2020-03-16 RX ADMIN — MECLIZINE HYDROCHLORIDE 25 MG: 25 TABLET ORAL at 14:38

## 2020-03-16 ASSESSMENT — ENCOUNTER SYMPTOMS
SEIZURES: 0
SHORTNESS OF BREATH: 1
DYSURIA: 0
DIARRHEA: 0
DIFFICULTY URINATING: 0
FREQUENCY: 0
COUGH: 1
HEADACHES: 1
LIGHT-HEADEDNESS: 1
FEVER: 0
DIZZINESS: 1
CONSTIPATION: 0
HEMATURIA: 0

## 2020-03-16 ASSESSMENT — MIFFLIN-ST. JEOR: SCORE: 2020.47

## 2020-03-16 NOTE — ED PROVIDER NOTES
History     Chief Complaint:  Dizziness    HPI   Melissa Jean Penrose is a 69 year old female who presents to the emergency department via EMS for evaluation of room spinning dizziness and lightheadedness for past 5 days in conjunction with migraines. Patient states she has not showered in the past 5 days due to feeling unsafe from her dizziness. Patient also reports shortness of breath in the mornings and cough. She denies chest pain, diarrhea, constipation, urinary symptoms, and fever. She denies history of seizures.    Allergies:  Penicillins  Amoxicillin  Morphine      Medications:    Flonase   Losartan   Remeron   Lopressor  Nasonex  Singulair   Omeprazole   Sumatriptan   Topiramate     Past Medical History:    Allergic state  Dementia  Hypertension  Substance abuse   TBI   Alcohol abuse   Frequent falls   Pulmonary nodules   Migraine   Depression     Past Surgical History:    Orthopedic surgery  Phacoemulsification clear cornea with standard intraocular lens implant, right     Family History:    Mother - Alzheimer's Disease     Social History:  The patient was accompanied to the ED by EMS.  Smoking Status: Former smoker  Smokeless Tobacco: Never  Alcohol Use: No  Marital Status:        Review of Systems   Constitutional: Negative for fever.   Respiratory: Positive for cough and shortness of breath.    Cardiovascular: Negative for chest pain.   Gastrointestinal: Negative for constipation and diarrhea.   Genitourinary: Negative for decreased urine volume, difficulty urinating, dysuria, frequency, hematuria and urgency.   Neurological: Positive for dizziness, light-headedness and headaches. Negative for seizures.   All other systems reviewed and are negative.    Physical Exam     Patient Vitals for the past 24 hrs:   BP Temp Temp src Pulse Heart Rate Resp SpO2 Height Weight   03/16/20 1435 138/83 -- -- 82 -- -- 98 % -- --   03/16/20 1430 130/75 -- -- -- -- -- -- -- --   03/16/20 1345 -- -- -- -- 71 13 96  % -- --   03/16/20 1330 120/71 -- -- 66 68 15 95 % -- --   03/16/20 1315 -- -- -- -- 67 14 97 % -- --   03/16/20 1230 129/70 -- -- 66 -- -- 95 % -- --   03/16/20 1155 138/79 98.4  F (36.9  C) Oral 73 -- 20 97 % 1.829 m (6') 138.3 kg (305 lb)       Physical Exam  Constitutional: Heavyset while female, supine.  HENT: No signs of trauma.   Eyes: EOM are normal. Pupils are equal, round, and reactive to light.   Head: Old traumatic scar right forehead and temporal region.  Neck: Normal range of motion. No JVD present. No cervical adenopathy.  Cardiovascular: Regular rhythm.  Exam reveals no gallop and no friction rub.    No murmur heard.  Pulmonary/Chest: Bilateral breath sounds normal. No wheezes, rhonchi or rales.  Abdominal: Soft. No tenderness. No rebound or guarding.   Musculoskeletal: No edema. No tenderness.   Lymphadenopathy: No lymphadenopathy.   Neurological: Alert and oriented to person, place, and time. Normal strength. Coordination normal.   Skin: Skin is warm and dry. No rash noted. No erythema.       Emergency Department Course   ECG (12:33:36):  Rate 87 bpm. AZ interval 172. QRS duration 90. QT/QTc 384/405. P-R-T axes 53 14 37. 1st AVB. Interpreted at 1235 by Zia Mao MD.    Imaging:  Radiographic findings were communicated with the patient who voiced understanding of the findings.  XR Chest 2 views:   IMPRESSION: No acute cardiopulmonary disease.  as per radiology.    Laboratory:  CBC: WBC: 10.1, HGB: 12.5, PLT: 407  CMP: Glucose 98, Albumin 3.1, (Creatinine: 0.79)  1215 Troponin: <0.015  Valproic acid: 31 (L)    Interventions:  1438 meclizine 25 mg Oral    Emergency Department Course:  Nursing notes and vitals reviewed. (4782) I performed an exam of the patient as documented above.     Medicine administered as documented above. Blood drawn. This was sent to the lab for further testing, results above.     The patient was sent for a xray while in the emergency department, findings above.      An EKG was recorded. Results as noted above.     1348 I rechecked the patient and discussed the results of her workup thus far.     1510 I rechecked the patient.    Findings and plan explained to the Patient. Patient discharged home with instructions regarding supportive care, medications, and reasons to return. The importance of close follow-up was reviewed. The patient was prescribed meclizine.    I personally reviewed the laboratory results with the Patient and answered all related questions prior to discharge.     Impression & Plan    Medical Decision Making:  This is a 69 year old who presents with dizziness. She complains of a cough, dizziness, and lightheadedness today. She does have a history of TBI and organic brain syndrome as well. On exam she is breathing comfortably without any respiratory distress or even cough. She has normal heart and lung sounds. Workup included blood, chest xray, and EKG which were all unremarkable. Her orthostatic changes were not present. She did not want to be cathed but she was unable to give us a urine sample. She kept forgetting. Patient received a meclizine tablet and she is eating a meal. She is very hungry and now she is feeling much better and feel comfortable going home. I think her dizziness is most likely related to some vertigo. I think it is unlikely to represent acute coronary disease or systemic infection. Patient will be discharged with her prescription for meclizine and follow up with her primary as needed.    Diagnosis:    ICD-10-CM    1. Dizziness  R42        Disposition:  discharged to home    Discharge Medications:  New Prescriptions    MECLIZINE (ANTIVERT) 25 MG TABLET    Take 1 tablet (25 mg) by mouth 3 times daily as needed for dizziness       Theo Matute  3/16/2020    EMERGENCY DEPARTMENT  Scribe Disclosure:  I, Theo Matute, am serving as a scribe at 12:09 PM on 3/16/2020 to document services personally performed by Zia Mao MD based on  my observations and the provider's statements to me.        Zia Mao MD  03/16/20 4308

## 2020-03-16 NOTE — ED NOTES
Bed: ED06  Expected date:   Expected time:   Means of arrival:   Comments:  DINORA Rodriguez f sob ETA 1148

## 2020-03-16 NOTE — ED NOTES
Report called to RN at the [facility]. Update given on pt and that pt will be returning with Reena. Discharge paperwork faxed to facility as RN gave patient her paperwork but staff reported pt not always the most reliable with paperwork so this RN will fax it to the [facility].

## 2020-03-16 NOTE — ED NOTES
PIV removed per pt request. Pt states she feels a room spinning sensation of dizziness. MD at bedside discussing with patient and meclizine ordered. Orthostatics obtained and normal

## 2020-03-16 NOTE — ED AVS SNAPSHOT
Emergency Department  [address] [facility] [address]  Phone:  [phone]  Fax:  [phone]                                    Melissa Jean Penrose   MRN: [id number]    Department:   Emergency Department   Date of Visit:  3/16/2020           After Visit Summary Signature Page    I have received my discharge instructions, and my questions have been answered. I have discussed any challenges I see with this plan with the nurse or doctor.    ..........................................................................................................................................  Patient/Patient Representative Signature      ..........................................................................................................................................  Patient Representative Print Name and Relationship to Patient    ..................................................               ................................................  Date                                   Time    ..........................................................................................................................................  Reviewed by Signature/Title    ...................................................              ..............................................  Date                                               Time          22EPIC Rev 08/18

## 2020-08-26 ENCOUNTER — RECORDS - HEALTHEAST (OUTPATIENT)
Dept: LAB | Facility: CLINIC | Age: 70
End: 2020-08-26

## 2020-08-27 LAB
ALBUMIN SERPL-MCNC: 2.8 G/DL (ref 3.5–5)
ALP SERPL-CCNC: 113 U/L (ref 45–120)
ALT SERPL W P-5'-P-CCNC: 15 U/L (ref 0–45)
ALT SERPL-CCNC: 15 U/L (ref 0–45)
ANION GAP SERPL CALCULATED.3IONS-SCNC: 6 MMOL/L (ref 5–18)
AST SERPL W P-5'-P-CCNC: 14 U/L (ref 0–40)
AST SERPL-CCNC: 14 U/L (ref 0–40)
BILIRUB SERPL-MCNC: 0.2 MG/DL (ref 0–1)
BUN SERPL-MCNC: 10 MG/DL (ref 8–22)
CALCIUM SERPL-MCNC: 8.7 MG/DL (ref 8.5–10.5)
CHLORIDE BLD-SCNC: 100 MMOL/L (ref 98–107)
CHOLEST SERPL-MCNC: 127 MG/DL
CHOLEST SERPL-MCNC: 127 MG/DL
CO2 SERPL-SCNC: 26 MMOL/L (ref 22–31)
CREAT SERPL-MCNC: 0.83 MG/DL (ref 0.6–1.1)
CREAT SERPL-MCNC: 0.83 MG/DL (ref 0.6–1.1)
ERYTHROCYTE [DISTWIDTH] IN BLOOD BY AUTOMATED COUNT: 13 % (ref 11–14.5)
FASTING STATUS PATIENT QL REPORTED: ABNORMAL
FOLATE SERPL-MCNC: 18.1 NG/ML
GFR SERPL CREATININE-BSD FRML MDRD: >60 ML/MIN/1.73M2
GFR SERPL CREATININE-BSD FRML MDRD: >60 ML/MIN/1.73M2
GLUCOSE BLD-MCNC: 127 MG/DL (ref 70–125)
GLUCOSE SERPL-MCNC: 127 MG/DL (ref 70–125)
HCT VFR BLD AUTO: 36.2 % (ref 35–47)
HDLC SERPL-MCNC: 45 MG/DL
HDLC SERPL-MCNC: 45 MG/DL
HGB BLD-MCNC: 11.7 G/DL (ref 12–16)
LDLC SERPL CALC-MCNC: 69 MG/DL
LDLC SERPL CALC-MCNC: 69 MG/DL
MCH RBC QN AUTO: 29.5 PG (ref 27–34)
MCHC RBC AUTO-ENTMCNC: 32.3 G/DL (ref 32–36)
MCV RBC AUTO: 91 FL (ref 80–100)
PLATELET # BLD AUTO: 330 THOU/UL (ref 140–440)
PMV BLD AUTO: 8.1 FL (ref 8.5–12.5)
POTASSIUM BLD-SCNC: 4.4 MMOL/L (ref 3.5–5)
POTASSIUM SERPL-SCNC: 4.4 MMOL/L (ref 3.5–5)
PROT SERPL-MCNC: 6.8 G/DL (ref 6–8)
RBC # BLD AUTO: 3.96 MILL/UL (ref 3.8–5.4)
SODIUM SERPL-SCNC: 132 MMOL/L (ref 136–145)
TRIGL SERPL-MCNC: 66 MG/DL
TRIGL SERPL-MCNC: 66 MG/DL
TSH SERPL DL<=0.005 MIU/L-ACNC: 3.78 UIU/ML (ref 0.3–5)
TSH SERPL-ACNC: 3.78 UIU/ML (ref 0.3–5)
WBC: 10.8 THOU/UL (ref 4–11)

## 2020-08-31 ENCOUNTER — RECORDS - HEALTHEAST (OUTPATIENT)
Dept: LAB | Facility: CLINIC | Age: 70
End: 2020-08-31

## 2020-09-01 LAB — VALPROATE SERPL-MCNC: 25.9 UG/ML (ref 50–150)

## 2020-09-09 ENCOUNTER — RECORDS - HEALTHEAST (OUTPATIENT)
Dept: LAB | Facility: CLINIC | Age: 70
End: 2020-09-09

## 2020-09-10 ENCOUNTER — TRANSFERRED RECORDS (OUTPATIENT)
Dept: HEALTH INFORMATION MANAGEMENT | Facility: CLINIC | Age: 70
End: 2020-09-10

## 2020-09-10 LAB
ALBUMIN SERPL-MCNC: 3.1 G/DL (ref 3.5–5)
ANION GAP SERPL CALCULATED.3IONS-SCNC: 8 MMOL/L (ref 5–18)
BUN SERPL-MCNC: 8 MG/DL (ref 8–22)
CALCIUM SERPL-MCNC: 8.8 MG/DL (ref 8.5–10.5)
CHLORIDE BLD-SCNC: 95 MMOL/L (ref 98–107)
CO2 SERPL-SCNC: 24 MMOL/L (ref 22–31)
CREAT SERPL-MCNC: 0.76 MG/DL (ref 0.6–1.1)
GFR SERPL CREATININE-BSD FRML MDRD: >60 ML/MIN/1.73M2
GLUCOSE BLD-MCNC: 106 MG/DL (ref 70–125)
PHOSPHATE SERPL-MCNC: 2.7 MG/DL (ref 2.5–4.5)
POTASSIUM BLD-SCNC: 4.3 MMOL/L (ref 3.5–5)
SODIUM SERPL-SCNC: 127 MMOL/L (ref 136–145)

## 2020-09-16 ENCOUNTER — RECORDS - HEALTHEAST (OUTPATIENT)
Dept: LAB | Facility: CLINIC | Age: 70
End: 2020-09-16

## 2020-09-17 LAB
ALBUMIN SERPL-MCNC: 2.9 G/DL (ref 3.5–5)
ANION GAP SERPL CALCULATED.3IONS-SCNC: 5 MMOL/L (ref 5–18)
BUN SERPL-MCNC: 8 MG/DL (ref 8–22)
CALCIUM SERPL-MCNC: 8.7 MG/DL (ref 8.5–10.5)
CHLORIDE BLD-SCNC: 99 MMOL/L (ref 98–107)
CO2 SERPL-SCNC: 25 MMOL/L (ref 22–31)
CREAT SERPL-MCNC: 0.78 MG/DL (ref 0.6–1.1)
GFR SERPL CREATININE-BSD FRML MDRD: >60 ML/MIN/1.73M2
GLUCOSE BLD-MCNC: 139 MG/DL (ref 70–125)
PHOSPHATE SERPL-MCNC: 2.3 MG/DL (ref 2.5–4.5)
POTASSIUM BLD-SCNC: 3.9 MMOL/L (ref 3.5–5)
SODIUM SERPL-SCNC: 129 MMOL/L (ref 136–145)

## 2020-09-18 ENCOUNTER — RECORDS - HEALTHEAST (OUTPATIENT)
Dept: LAB | Facility: CLINIC | Age: 70
End: 2020-09-18

## 2020-09-18 LAB
ALBUMIN UR-MCNC: NEGATIVE MG/DL
APPEARANCE UR: CLEAR
BILIRUB UR QL STRIP: NEGATIVE
COLOR UR AUTO: YELLOW
GLUCOSE UR STRIP-MCNC: NEGATIVE MG/DL
HGB UR QL STRIP: NEGATIVE
KETONES UR STRIP-MCNC: NEGATIVE MG/DL
LEUKOCYTE ESTERASE UR QL STRIP: NEGATIVE
NITRATE UR QL: NEGATIVE
PH UR STRIP: 7 [PH] (ref 4.5–8)
SODIUM UR-SCNC: 38 MMOL/L
SP GR UR STRIP: 1.01 (ref 1–1.03)
UROBILINOGEN UR STRIP-ACNC: NORMAL

## 2020-09-21 ENCOUNTER — RECORDS - HEALTHEAST (OUTPATIENT)
Dept: LAB | Facility: CLINIC | Age: 70
End: 2020-09-21

## 2020-09-22 LAB — CORTIS SERPL-MCNC: 4.6 UG/DL

## 2020-09-23 ENCOUNTER — TRANSFERRED RECORDS (OUTPATIENT)
Dept: HEALTH INFORMATION MANAGEMENT | Facility: CLINIC | Age: 70
End: 2020-09-23

## 2020-09-23 ENCOUNTER — MEDICAL CORRESPONDENCE (OUTPATIENT)
Dept: HEALTH INFORMATION MANAGEMENT | Facility: CLINIC | Age: 70
End: 2020-09-23

## 2020-09-23 ENCOUNTER — RECORDS - HEALTHEAST (OUTPATIENT)
Dept: LAB | Facility: CLINIC | Age: 70
End: 2020-09-23

## 2020-09-24 LAB
ALBUMIN SERPL-MCNC: 2.8 G/DL (ref 3.5–5)
ANION GAP SERPL CALCULATED.3IONS-SCNC: 10 MMOL/L (ref 5–18)
BUN SERPL-MCNC: 9 MG/DL (ref 8–22)
CALCIUM SERPL-MCNC: 8.3 MG/DL (ref 8.5–10.5)
CHLORIDE BLD-SCNC: 97 MMOL/L (ref 98–107)
CO2 SERPL-SCNC: 21 MMOL/L (ref 22–31)
CREAT SERPL-MCNC: 0.82 MG/DL (ref 0.6–1.1)
GFR SERPL CREATININE-BSD FRML MDRD: >60 ML/MIN/1.73M2
GLUCOSE BLD-MCNC: 201 MG/DL (ref 70–125)
PHOSPHATE SERPL-MCNC: 3.1 MG/DL (ref 2.5–4.5)
POTASSIUM BLD-SCNC: 3.7 MMOL/L (ref 3.5–5)
SODIUM SERPL-SCNC: 128 MMOL/L (ref 136–145)

## 2020-09-25 ENCOUNTER — TRANSFERRED RECORDS (OUTPATIENT)
Dept: HEALTH INFORMATION MANAGEMENT | Facility: CLINIC | Age: 70
End: 2020-09-25

## 2020-09-25 ENCOUNTER — MEDICAL CORRESPONDENCE (OUTPATIENT)
Dept: HEALTH INFORMATION MANAGEMENT | Facility: CLINIC | Age: 70
End: 2020-09-25

## 2020-09-30 ENCOUNTER — RECORDS - HEALTHEAST (OUTPATIENT)
Dept: LAB | Facility: CLINIC | Age: 70
End: 2020-09-30

## 2020-09-30 ENCOUNTER — MEDICAL CORRESPONDENCE (OUTPATIENT)
Dept: HEALTH INFORMATION MANAGEMENT | Facility: CLINIC | Age: 70
End: 2020-09-30

## 2020-10-01 ENCOUNTER — TRANSFERRED RECORDS (OUTPATIENT)
Dept: MULTI SPECIALTY CLINIC | Facility: CLINIC | Age: 70
End: 2020-10-01

## 2020-10-01 LAB
ALBUMIN SERPL-MCNC: 3.2 G/DL (ref 3.5–5)
ANION GAP SERPL CALCULATED.3IONS-SCNC: 6 MMOL/L (ref 5–18)
ANION GAP SERPL CALCULATED.3IONS-SCNC: 9 MMOL/L (ref 5–18)
BUN SERPL-MCNC: 7 MG/DL (ref 8–22)
BUN SERPL-MCNC: 7 MG/DL (ref 8–22)
CALCIUM SERPL-MCNC: 8.6 MG/DL (ref 8.5–10.5)
CALCIUM SERPL-MCNC: 8.8 MG/DL (ref 8.5–10.5)
CHLORIDE BLD-SCNC: 97 MMOL/L (ref 98–107)
CHLORIDE BLD-SCNC: 98 MMOL/L (ref 98–107)
CO2 SERPL-SCNC: 24 MMOL/L (ref 22–31)
CO2 SERPL-SCNC: 25 MMOL/L (ref 22–31)
CREAT SERPL-MCNC: 0.77 MG/DL (ref 0.6–1.1)
GFR SERPL CREATININE-BSD FRML MDRD: >60 ML/MIN/1.73M2
GLUCOSE BLD-MCNC: 70 MG/DL (ref 70–125)
GLUCOSE BLD-MCNC: 71 MG/DL (ref 70–125)
GLUCOSE SERPL-MCNC: 70 MG/DL (ref 70–125)
PHOSPHATE SERPL-MCNC: 3 MG/DL (ref 2.5–4.5)
POTASSIUM BLD-SCNC: 4.1 MMOL/L (ref 3.5–5)
POTASSIUM BLD-SCNC: 4.8 MMOL/L (ref 3.5–5)
POTASSIUM SERPL-SCNC: 4.8 MMOL/L (ref 3.5–5)
SODIUM SERPL-SCNC: 129 MMOL/L (ref 136–145)
SODIUM SERPL-SCNC: 130 MMOL/L (ref 136–145)

## 2020-11-03 ENCOUNTER — HOSPITAL ENCOUNTER (OUTPATIENT)
Dept: CT IMAGING | Facility: CLINIC | Age: 70
Discharge: HOME | End: 2020-11-03
Attending: FAMILY MEDICINE | Admitting: FAMILY MEDICINE
Payer: COMMERCIAL

## 2020-11-03 DIAGNOSIS — R91.8 OTHER NONSPECIFIC ABNORMAL FINDING OF LUNG FIELD: ICD-10-CM

## 2020-11-03 PROCEDURE — 71250 CT THORAX DX C-: CPT

## 2020-11-04 ENCOUNTER — RECORDS - HEALTHEAST (OUTPATIENT)
Dept: LAB | Facility: CLINIC | Age: 70
End: 2020-11-04

## 2020-11-05 LAB
ANION GAP SERPL CALCULATED.3IONS-SCNC: 8 MMOL/L (ref 5–18)
BUN SERPL-MCNC: 10 MG/DL (ref 8–28)
CALCIUM SERPL-MCNC: 8.6 MG/DL (ref 8.5–10.5)
CHLORIDE BLD-SCNC: 102 MMOL/L (ref 98–107)
CO2 SERPL-SCNC: 22 MMOL/L (ref 22–31)
CREAT SERPL-MCNC: 0.81 MG/DL (ref 0.6–1.1)
GFR SERPL CREATININE-BSD FRML MDRD: >60 ML/MIN/1.73M2
GLUCOSE BLD-MCNC: 116 MG/DL (ref 70–125)
POTASSIUM BLD-SCNC: 4.1 MMOL/L (ref 3.5–5)
SODIUM SERPL-SCNC: 132 MMOL/L (ref 136–145)

## 2020-11-09 ENCOUNTER — RECORDS - HEALTHEAST (OUTPATIENT)
Dept: LAB | Facility: CLINIC | Age: 70
End: 2020-11-09

## 2020-11-10 LAB
ANION GAP SERPL CALCULATED.3IONS-SCNC: 7 MMOL/L (ref 5–18)
BUN SERPL-MCNC: 11 MG/DL (ref 8–28)
CALCIUM SERPL-MCNC: 9.1 MG/DL (ref 8.5–10.5)
CHLORIDE BLD-SCNC: 100 MMOL/L (ref 98–107)
CO2 SERPL-SCNC: 24 MMOL/L (ref 22–31)
CREAT SERPL-MCNC: 0.76 MG/DL (ref 0.6–1.1)
GFR SERPL CREATININE-BSD FRML MDRD: >60 ML/MIN/1.73M2
GLUCOSE BLD-MCNC: 118 MG/DL (ref 70–125)
POTASSIUM BLD-SCNC: 4.3 MMOL/L (ref 3.5–5)
SODIUM SERPL-SCNC: 131 MMOL/L (ref 136–145)

## 2020-11-12 ENCOUNTER — TELEPHONE (OUTPATIENT)
Dept: ENDOCRINOLOGY | Facility: CLINIC | Age: 70
End: 2020-11-12

## 2020-11-12 NOTE — TELEPHONE ENCOUNTER
Reason for Call: Request for an order or referral:    Order or referral being requested: Endocrinology    Date needed: as soon as possible    Has the patient been seen by the PCP for this problem? NO    Additional comments: Serena with [facility] calling in to make sure we got referral for Endo. She sent it around 11/6 and she resent it just now as well.    Please contact them once referral is received. Please check if she can come in sooner. # [phone]    Scheduled appt for 12/21/20 @ 2pm    Phone number Patient can be reached at:  Cell number on file:    Telephone Information:   Mobile [phone]       Best Time:  any    Can we leave a detailed message on this number?  YES    Call taken on 11/12/2020 at 11:31 AM by David Garcia

## 2020-11-12 NOTE — TELEPHONE ENCOUNTER
"I'm a bit confused; do we require a referral for this patient to see ?  I left a message on Serena (caller) LISANDRA.  We will try to locate the \"referral\" from [facility].      Leaving encounter open and will check with Team Coordinators.  Dedra Cooley RN on 11/12/2020 at 2:52 PM    "

## 2020-11-16 RX ORDER — THIAMINE MONONITRATE (VIT B1) 100 MG
TABLET ORAL
Qty: 30 TABLET | Refills: 7 | OUTPATIENT
Start: 2020-11-16

## 2020-12-02 ENCOUNTER — RECORDS - HEALTHEAST (OUTPATIENT)
Dept: LAB | Facility: CLINIC | Age: 70
End: 2020-12-02

## 2020-12-03 LAB
HBA1C MFR BLD: 5.8 %
SODIUM SERPL-SCNC: 128 MMOL/L (ref 136–145)

## 2020-12-21 ENCOUNTER — VIRTUAL VISIT (OUTPATIENT)
Dept: ENDOCRINOLOGY | Facility: CLINIC | Age: 70
End: 2020-12-21
Payer: COMMERCIAL

## 2020-12-21 DIAGNOSIS — E87.1 HYPONATREMIA: Primary | ICD-10-CM

## 2020-12-21 PROCEDURE — 99204 OFFICE O/P NEW MOD 45 MIN: CPT | Mod: 95 | Performed by: INTERNAL MEDICINE

## 2020-12-21 PROCEDURE — 99358 PROLONG SERVICE W/O CONTACT: CPT | Performed by: INTERNAL MEDICINE

## 2020-12-21 RX ORDER — DIVALPROEX SODIUM 250 MG/1
250 TABLET, DELAYED RELEASE ORAL 2 TIMES DAILY
COMMUNITY
Start: 2020-11-30

## 2020-12-21 NOTE — PROGRESS NOTES
"Melissa Jean Penrose is a 70 year old female who is being evaluated via a billable video visit.      The patient has been notified of following:     \"This video visit will be conducted via a call between you and your physician/provider. We have found that certain health care needs can be provided without the need for an in-person physical exam.  This service lets us provide the care you need with a video conversation.  If a prescription is necessary we can send it directly to your pharmacy.  If lab work is needed we can place an order for that and you can then stop by our lab to have the test done at a later time.    Video visits are billed at different rates depending on your insurance coverage.  Please reach out to your insurance provider with any questions.    If during the course of the call the physician/provider feels a video visit is not appropriate, you will not be charged for this service.\"    Patient has given verbal consent for Video visit? Yes  How would you like to obtain your AVS? Verbally Reviewed  If you are dropped from the video visit, the video invite should be resent to: Nurse Cellphone  Will anyone else be joining your video visit? Nurse Ervin        Name: Melissa Jean Penrose Ms. Penrose is a 70 year old woman, seen at the request of Jacinda ZAVALA for evaluation of     Chief Complaint   Patient presents with     Hyponatremia       HPI:  Recent issues:  Here for evaluation of hyponatremia  Reviewed medical history from patient and Epic chart record        6/2015. Fall injury at home, traumatic brain injury.  Had cervical spine fracture, SDH, subsequent memory and balance issues  Previous medical evaluations with Dr. Malik Christensen/Ritesh Duvall    ~2018. [place] [facility]  Medical evaluation with Shirley ZAVALA and Dr. Andrade/[facility]  [place] with Shirley [place] [facility], lab testing also done   Patient activities limited with COVID-19 " pandemic    Previous [facility] labs include:      1/3/19 labs included low sodium 133, normal potassium 4.1  Previous HE ([facility]?) labs include:      20 cortisol:  4.6 micrograms/dL    Walks with cane, due to problems related to her fall injury   Recent symptoms include:   Some fatigue   Periodic migraine headaches   Some balance problems   Ankle swelling L>R   Chronic cough   Occasional low back pains   Denies SOB, nausea, obvious confusion, muscle weakness, irritability   Denies frequent thirst, but fluid intake:    Drinks decaf coffee (6), water (3), diet Coke (1), cranberry juice (3)    No alcohol use   Urination ~6-7/day, 2/night  Other chronic illnesses include:   Hypertension:   Takes losartan, metoprolol meds, followed by PCP   Allergies:    Followed by PCP   GERD:   Takes Prilosec med, followed by PCP    Lives in [place], [place]  Sees [facility] medicine evaluations.    PMH/PSH:  Past Medical History:   Diagnosis Date     Allergic state      Anemia due to blood loss, acute      Cervical spine fracture (H)     C1 & C2     Chronic back pain      Dementia (H)      Fibromyalgia      Hypertension      IFG (impaired fasting glucose)      Osteopenia      Subdural hematoma (H)      Substance abuse (H)     ETOH     TBI (traumatic brain injury) (H)      Past Surgical History:   Procedure Laterality Date     ORTHOPEDIC SURGERY       PHACOEMULSIFICATION CLEAR CORNEA WITH STANDARD INTRAOCULAR LENS IMPLANT Right 2017    Procedure: PHACOEMULSIFICATION CLEAR CORNEA WITH STANDARD INTRAOCULAR LENS IMPLANT;  Surgeon: Brianna Christy MD;  Location: [facility]       Family Hx:  Family History   Problem Relation Age of Onset     Alzheimer Disease Mother 58         71         Social Hx:  Social History     Socioeconomic History     Marital status:      Spouse name: Not on file     Number of children: Not on file     Years of education: Not on file     Highest  education level: Not on file   Occupational History     Not on file   Social Needs     Financial resource strain: Not on file     Food insecurity     Worry: Not on file     Inability: Not on file     Transportation needs     Medical: Not on file     Non-medical: Not on file   Tobacco Use     Smoking status: Former Smoker     Quit date: 10/23/1985     Years since quittin.1     Smokeless tobacco: Never Used   Substance and Sexual Activity     Alcohol use: Yes     Alcohol/week: 2.0 standard drinks     Types: 2 Shots of liquor per week     Comment: 2 drinks a night     Drug use: No     Sexual activity: Never   Lifestyle     Physical activity     Days per week: Not on file     Minutes per session: Not on file     Stress: Not on file   Relationships     Social connections     Talks on phone: Not on file     Gets together: Not on file     Attends [employer] service: Not on file     Active member of club or organization: Not on file     Attends meetings of clubs or organizations: Not on file     Relationship status: Not on file     Intimate partner violence     Fear of current or ex partner: Not on file     Emotionally abused: Not on file     Physically abused: Not on file     Forced sexual activity: Not on file   Other Topics Concern     Not on file   Social History Narrative    The patient is  for the last 16 years.  She is retired.  She has step-kids by her , but they are not that close. Does talk to step-daughter, Rimma once a week. She is a [employer]  in [place], [place]. She does not smoke.  She quit smoking 15 years ago.  She drinks 2 drinks a night.  She has never been through treatment, but denies any history of alcohol withdrawal. Updated 17          MEDICATIONS:  has a current medication list which includes the following prescription(s): acetaminophen, vitamin c, azelastine, divalproex sodium delayed-release, folic acid, losartan, metoprolol tartrate, mirtazapine, montelukast,  omeprazole, sumatriptan, topiramate, vitamin b-1, vitamin c, fluticasone, meclizine, and mometasone.    ROS:     ROS: 10 point ROS neg other than the symptoms noted above in the HPI.    GENERAL: mild fatigue, wt stable; denies fevers, chills, night sweats.    HEENT: no dysphagia, odonophagia, diplopia, neck pain  THYROID:  no apparent hyper or hypothyroid symptoms  CV: no chest pain, pressure, palpitations  LUNGS: cough; no SOB, IVEY, wheezing   ABDOMEN: no diarrhea, constipation, abdominal pain  EXTREMITIES: some ankle swelling; no rashes, ulcers  NEUROLOGY: headaches, some balance problems, denies changes in vision, tingling, extremitiy numbness   MSK: no muscle aches or pains, weakness  SKIN: no rashes or lesions  : drinks several beverages per day as noted, nocturia 2x/night, no menses  PSYCH:  stable mood, no significant anxiety or depression  ENDOCRINE: no heat or cold intolerance    Physical Exam (visual exam)  VS:  no vital signs taken for video visit  CONSTITUTIONAL: healthy, alert and NAD, well dressed, answering questions appropriately  ENT: no nose swelling or nasal discharge, mouth redness or gum changes.  EYES: eyes grossly normal to inspection, conjunctivae and sclerae normal, no exophthalmos or proptosis  THYROID:  no apparent nodules or goiter  LUNGS: no audible wheeze, cough or visible cyanosis, no visible retractions or increased work of breathing  ABDOMEN: abdomen obese  EXTREMITIES: no hand tremors, limited exam  NEUROLOGY: CN grossly intact, mentation intact and speech normal   SKIN:  no apparent skin lesions, rash, or edema with visualized skin appearance  PSYCH: mentation appears normal, affect normal/bright, judgement and insight intact,   normal speech and appearance well groomed      LABS:    All pertinent notes, labs, and images personally reviewed by me.     A/P:  Encounter Diagnosis   Name Primary?     Hyponatremia Yes       Comments:   Reviewed complicated health history and  electrolyte issues. Records indicate low serum sodium levels since 1/2019  Difficult to tell if symptoms related to prior closed head injury.  DDX includes SIADH, CHF, polydipsia.  Previous blood cortisol normal- adrenal insufficiency unlikely    Plan:  Reviewed potential causes for low sodium levels (hyponatremia)  Discussed lab tests used to assess sodium metabolism  We discussed the overnight fluid deprivation testing    Recommend:  Needs repeat lab tests to assess hyponatremia   Discussed the overnight fluid deprivation protocol, no fluid after midnight before morning blood and urine testing   Lab testing for BMP, BNP, cortisol, blood and urine osmolality   Will fax lab order form to [facility], [phone] (attn nursing dept)  Monitor for symptom changes  Avoid medications that may cause hyponatremia including diuretics, antidepressants, pain medications  May need repeat cardiovascular workup such as ECHO to screen for CHF  Consider nephrology consultation  Will review results at followup video visit in next few weeks also     Addressed patient questions today    More than 50% of the time spent with Ms. Penrose on counseling / coordinating her care.  Total appointment time was 30 minutes.  Additional 40 minutes spent reviewing [place], [facility], [facility], [facility] medical records today    Follow-up: 1/8/21 at 3pm, Priyanka Erickson MD, MS  Endocrinology  [facility]    CC: Jacinda Lawson      Video-Visit Details    Type of service:  Video Visit    Video Start Time: 2:15 pm  Video End Time: 2:45 pm    Originating Location (pt. Location): home    Distant Location (provider location):  [facility]/home     Platform used for Video Visit: [employer]

## 2020-12-21 NOTE — LETTER
"    12/21/2020         RE: Melissa Jean Penrose  [address] [address]  [facility] [address]        Dear Colleague,    Thank you for referring your patient, Melissa Jean Penrose, to the [facility]. Please see a copy of my visit note below.    Melissa Jean Penrose is a 70 year old female who is being evaluated via a billable video visit.      The patient has been notified of following:     \"This video visit will be conducted via a call between you and your physician/provider. We have found that certain health care needs can be provided without the need for an in-person physical exam.  This service lets us provide the care you need with a video conversation.  If a prescription is necessary we can send it directly to your pharmacy.  If lab work is needed we can place an order for that and you can then stop by our lab to have the test done at a later time.    Video visits are billed at different rates depending on your insurance coverage.  Please reach out to your insurance provider with any questions.    If during the course of the call the physician/provider feels a video visit is not appropriate, you will not be charged for this service.\"    Patient has given verbal consent for Video visit? Yes  How would you like to obtain your AVS? Verbally Reviewed  If you are dropped from the video visit, the video invite should be resent to: Nurse Cellphone  Will anyone else be joining your video visit? Nurse Ervin        Name: Melissa Jean Penrose Ms. Penrose is a 70 year old woman, seen at the request of Jacinda Lawson PAC for evaluation of     Chief Complaint   Patient presents with     Hyponatremia       HPI:  Recent issues:  Here for evaluation of hyponatremia  Reviewed medical history from patient and Epic chart record        6/2015. Fall injury at home, traumatic brain injury.  Had cervical spine fracture, SDH, subsequent memory and balance issues  Previous medical evaluations with Dr. Gan " Fermin/Ritesh Duvall    ~2018. Moved to [facility]  Medical evaluation with Shirley Lawson PAC and Dr. Andrade/[facility]  Home visits with Shirley to [facility] monthly, lab testing also done   Patient activities limited with COVID-19 pandemic    Previous [facility] labs include:      1/3/19 labs included low sodium 133, normal potassium 4.1  Previous HE ([facility]?) labs include:      9/22/20 cortisol:  4.6 micrograms/dL    Walks with cane, due to problems related to her fall injury 2018  Recent symptoms include:   Some fatigue   Periodic migraine headaches   Some balance problems   Ankle swelling L>R   Chronic cough   Occasional low back pains   Denies SOB, nausea, obvious confusion, muscle weakness, irritability   Denies frequent thirst, but fluid intake:    Drinks decaf coffee (6), water (3), diet Coke (1), cranberry juice (3)    No alcohol use   Urination ~6-7/day, 2/night  Other chronic illnesses include:   Hypertension:   Takes losartan, metoprolol meds, followed by PCP   Allergies:    Followed by PCP   GERD:   Takes Prilosec med, followed by PCP    Lives in [place], [place]  Sees Shirley [facility] for general medicine evaluations.    PMH/PSH:  Past Medical History:   Diagnosis Date     Allergic state      Anemia due to blood loss, acute      Cervical spine fracture (H) 2015    C1 & C2     Chronic back pain      Dementia (H)      Fibromyalgia      Hypertension      IFG (impaired fasting glucose)      Osteopenia      Subdural hematoma (H) 2015     Substance abuse (H)     ETOH     TBI (traumatic brain injury) (H)      Past Surgical History:   Procedure Laterality Date     ORTHOPEDIC SURGERY       PHACOEMULSIFICATION CLEAR CORNEA WITH STANDARD INTRAOCULAR LENS IMPLANT Right 2/7/2017    Procedure: PHACOEMULSIFICATION CLEAR CORNEA WITH STANDARD INTRAOCULAR LENS IMPLANT;  Surgeon: Brianna Christy MD;  Location: [facility]       Family Hx:  Family History   Problem Relation Age  of Onset     Alzheimer Disease Mother 58         71         Social Hx:  Social History     Socioeconomic History     Marital status:      Spouse name: Not on file     Number of children: Not on file     Years of education: Not on file     Highest education level: Not on file   Occupational History     Not on file   Social Needs     Financial resource strain: Not on file     Food insecurity     Worry: Not on file     Inability: Not on file     Transportation needs     Medical: Not on file     Non-medical: Not on file   Tobacco Use     Smoking status: Former Smoker     Quit date: 10/23/1985     Years since quittin.1     Smokeless tobacco: Never Used   Substance and Sexual Activity     Alcohol use: Yes     Alcohol/week: 2.0 standard drinks     Types: 2 Shots of liquor per week     Comment: 2 drinks a night     Drug use: No     Sexual activity: Never   Lifestyle     Physical activity     Days per week: Not on file     Minutes per session: Not on file     Stress: Not on file   Relationships     Social connections     Talks on phone: Not on file     Gets together: Not on file     Attends Anabaptism service: Not on file     Active member of club or organization: Not on file     Attends meetings of clubs or organizations: Not on file     Relationship status: Not on file     Intimate partner violence     Fear of current or ex partner: Not on file     Emotionally abused: Not on file     Physically abused: Not on file     Forced sexual activity: Not on file   Other Topics Concern     Not on file   Social History Narrative    The patient is  for the last 16 years.  She is retired.  She has step-kids by her , but they are not that close. Does talk to step-daughter, Rimma once a week. She is a Catholic  in [place], [place]. She does not smoke.  She quit smoking 15 years ago.  She drinks 2 drinks a night.  She has never been through treatment, but denies any history of alcohol withdrawal.  Updated 05/02/17          MEDICATIONS:  has a current medication list which includes the following prescription(s): acetaminophen, vitamin c, azelastine, divalproex sodium delayed-release, folic acid, losartan, metoprolol tartrate, mirtazapine, montelukast, omeprazole, sumatriptan, topiramate, vitamin b-1, vitamin c, fluticasone, meclizine, and mometasone.    ROS:     ROS: 10 point ROS neg other than the symptoms noted above in the HPI.    GENERAL: mild fatigue, wt stable; denies fevers, chills, night sweats.    HEENT: no dysphagia, odonophagia, diplopia, neck pain  THYROID:  no apparent hyper or hypothyroid symptoms  CV: no chest pain, pressure, palpitations  LUNGS: cough; no SOB, IVEY, wheezing   ABDOMEN: no diarrhea, constipation, abdominal pain  EXTREMITIES: some ankle swelling; no rashes, ulcers  NEUROLOGY: headaches, some balance problems, denies changes in vision, tingling, extremitiy numbness   MSK: no muscle aches or pains, weakness  SKIN: no rashes or lesions  : drinks several beverages per day as noted, nocturia 2x/night, no menses  PSYCH:  stable mood, no significant anxiety or depression  ENDOCRINE: no heat or cold intolerance    Physical Exam (visual exam)  VS:  no vital signs taken for video visit  CONSTITUTIONAL: healthy, alert and NAD, well dressed, answering questions appropriately  ENT: no nose swelling or nasal discharge, mouth redness or gum changes.  EYES: eyes grossly normal to inspection, conjunctivae and sclerae normal, no exophthalmos or proptosis  THYROID:  no apparent nodules or goiter  LUNGS: no audible wheeze, cough or visible cyanosis, no visible retractions or increased work of breathing  ABDOMEN: abdomen obese  EXTREMITIES: no hand tremors, limited exam  NEUROLOGY: CN grossly intact, mentation intact and speech normal   SKIN:  no apparent skin lesions, rash, or edema with visualized skin appearance  PSYCH: mentation appears normal, affect normal/bright, judgement and insight intact,    normal speech and appearance well groomed      LABS:    All pertinent notes, labs, and images personally reviewed by me.     A/P:  Encounter Diagnosis   Name Primary?     Hyponatremia Yes       Comments:   Reviewed complicated health history and electrolyte issues. Records indicate low serum sodium levels since 1/2019  Difficult to tell if symptoms related to prior closed head injury.  DDX includes SIADH, CHF, polydipsia.  Previous blood cortisol normal- adrenal insufficiency unlikely    Plan:  Reviewed potential causes for low sodium levels (hyponatremia)  Discussed lab tests used to assess sodium metabolism  We discussed the overnight fluid deprivation testing    Recommend:  Needs repeat lab tests to assess hyponatremia   Discussed the overnight fluid deprivation protocol, no fluid after midnight before morning blood and urine testing   Lab testing for BMP, BNP, cortisol, blood and urine osmolality   Will fax lab order form to [facility], [phone] (attn nursing dept)  Monitor for symptom changes  Avoid medications that may cause hyponatremia including diuretics, antidepressants, pain medications  May need repeat cardiovascular workup such as ECHO to screen for CHF  Consider nephrology consultation  Will review results at followup video visit in next few weeks also     Addressed patient questions today    More than 50% of the time spent with Ms. Penrose on counseling / coordinating her care.  Total appointment time was 30 minutes.  Additional 40 minutes spent reviewing [facility], [facility], [facility] medical records today    Follow-up: 1/8/21 at 3pm, Priyanka Erickson MD, MS  Endocrinology  [facility]    CC: Jacinda Lawson      Video-Visit Details    Type of service:  Video Visit    Video Start Time: 2:15 pm  Video End Time: 2:45 pm    Originating Location (pt. Location): home    Distant Location (provider location):  [facility]/home     Platform used for Video  Visit: Norbert              Again, thank you for allowing me to participate in the care of your patient.        Sincerely,        Curtis Erickson MD

## 2020-12-23 ENCOUNTER — TELEPHONE (OUTPATIENT)
Dept: FAMILY MEDICINE | Facility: CLINIC | Age: 70
End: 2020-12-23

## 2020-12-23 NOTE — TELEPHONE ENCOUNTER
Dr Duque:     Incoming call from nurse Corey at [facility]     Received lab work faxed yesterday     Labs does not have ICD code - cannot do labs unless they have ICD code associated     Labs from - Dr Ramos     Labs: BMP, DNP, cortisol, blood osmolality, urine specific gravity, urine osmolality     Fax number: [phone]    [facility]     Lab only comes on Thursday - they come tomorrow and requesting ASAP faxed over     Yun FLORES RN

## 2020-12-23 NOTE — TELEPHONE ENCOUNTER
Message noted.  I have added the ICD10 code for hyponatremia (E87.1) and we will now re-fax the lab order to them, as requested.    ODALYS Erickson MD, MS  Endocrinology  [facility]

## 2021-02-05 ENCOUNTER — RECORDS - HEALTHEAST (OUTPATIENT)
Dept: LAB | Facility: CLINIC | Age: 71
End: 2021-02-05

## 2021-02-05 LAB
ANION GAP SERPL CALCULATED.3IONS-SCNC: 9 MMOL/L (ref 5–18)
BUN SERPL-MCNC: 9 MG/DL (ref 8–28)
CALCIUM SERPL-MCNC: 8.6 MG/DL (ref 8.5–10.5)
CHLORIDE BLD-SCNC: 98 MMOL/L (ref 98–107)
CO2 SERPL-SCNC: 22 MMOL/L (ref 22–31)
CREAT SERPL-MCNC: 0.84 MG/DL (ref 0.6–1.1)
GFR SERPL CREATININE-BSD FRML MDRD: >60 ML/MIN/1.73M2
GLUCOSE BLD-MCNC: 159 MG/DL (ref 70–125)
POTASSIUM BLD-SCNC: 4.6 MMOL/L (ref 3.5–5)
SODIUM SERPL-SCNC: 129 MMOL/L (ref 136–145)
VALPROATE SERPL-MCNC: 35.8 UG/ML (ref 50–150)

## 2021-02-17 ENCOUNTER — RECORDS - HEALTHEAST (OUTPATIENT)
Dept: LAB | Facility: CLINIC | Age: 71
End: 2021-02-17

## 2021-02-18 LAB — SODIUM SERPL-SCNC: 125 MMOL/L (ref 136–145)

## 2021-02-19 ENCOUNTER — TELEPHONE (OUTPATIENT)
Dept: ENDOCRINOLOGY | Facility: CLINIC | Age: 71
End: 2021-02-19

## 2021-02-19 NOTE — TELEPHONE ENCOUNTER
Reason for call:  Patient reporting a symptom    Symptom or request: Lower sodium lvl  Dec 128  Feb 5th 129  Today 125  NP Guero is going to order labs, the labs that Dr Erickson Ordered in December because they were never done    The patient may drink for these labs because they cant stop her from that since she lives in [facility] but they will encourage her not to, wants Dr Erickson to know this when interpreting lab results  Patient was supposed to F/U In Feb and did not   Since the patients sodium is very low Patient should have a F/U visit asap    In the future since the patient resides in Memory care can we reach out to Nursing to schedule visits    Have you been treated for this before? Yes    Additional comments: Can call FOR[facility] if any questions [phone]        Call taken on 2/19/2021 at 1:57 PM by Gita Bourgeois

## 2021-02-20 NOTE — TELEPHONE ENCOUNTER
Message noted.  I saw this patient for an endocrinology evaluation on 12/21/20 and ordered several morning lab tests to be done after patient has no fluid intake overnight.  The lab order form was sent to , sent to [facility], [phone] (attn nursing dept) at that time.    I will await the results of these lab tests and then coordinate a follow-up virtual visit appointment with patient.    Please relay message to her provider Jacinda Lawson PAC, thanks.    ODALYS Erickson MD, MS  Endocrinology  [facility]

## 2021-02-21 ENCOUNTER — RECORDS - HEALTHEAST (OUTPATIENT)
Dept: LAB | Facility: CLINIC | Age: 71
End: 2021-02-21

## 2021-02-23 LAB
ANION GAP SERPL CALCULATED.3IONS-SCNC: 7 MMOL/L (ref 5–18)
BNP SERPL-MCNC: 63 PG/ML (ref 0–120)
BUN SERPL-MCNC: 11 MG/DL (ref 8–28)
CALCIUM SERPL-MCNC: 8.7 MG/DL (ref 8.5–10.5)
CHLORIDE BLD-SCNC: 94 MMOL/L (ref 98–107)
CO2 SERPL-SCNC: 25 MMOL/L (ref 22–31)
CORTIS SERPL-MCNC: 5.9 UG/DL
CREAT SERPL-MCNC: 0.81 MG/DL (ref 0.6–1.1)
GFR SERPL CREATININE-BSD FRML MDRD: >60 ML/MIN/1.73M2
GLUCOSE BLD-MCNC: 101 MG/DL (ref 70–125)
OSMOLALITY SERPL: 265 MOSM/KG (ref 270–300)
POTASSIUM BLD-SCNC: 4.5 MMOL/L (ref 3.5–5)
SODIUM SERPL-SCNC: 126 MMOL/L (ref 136–145)
TSH SERPL DL<=0.005 MIU/L-ACNC: 5.5 UIU/ML (ref 0.3–5)

## 2021-02-23 NOTE — TELEPHONE ENCOUNTER
Spoke with Jacinda CANDELARIA - states she ordered labs (they will send us results once they are done).    Irene Polanco MA

## 2021-03-08 ENCOUNTER — TELEPHONE (OUTPATIENT)
Dept: ENDOCRINOLOGY | Facility: CLINIC | Age: 71
End: 2021-03-08

## 2021-03-08 NOTE — TELEPHONE ENCOUNTER
Reason for Call:  Other appointment    Detailed comments: Aleisha GARCIA with [facility] physician services calling to see if lab results were received by Dr. Erickson ? Also requesting that staff reaches out to the [facility] [place]  ([phone])   to schedule pt for a follow up visit with Dr. Erickson.     Phone Number Patient can be reached at: Other phone number: [phone]    Best Time: any    Can we leave a detailed message on this number? Not Applicable    Call taken on 3/8/2021 at 2:07 PM by Yandy Hampton

## 2021-03-08 NOTE — TELEPHONE ENCOUNTER
1) Called [facility] ([facility]) and set up video visit endocrine appt     2) Dr Duque - have you received lab results from [facility]?     Yun FLORES RN

## 2021-03-09 ENCOUNTER — HOSPITAL ENCOUNTER (OUTPATIENT)
Dept: CT IMAGING | Facility: CLINIC | Age: 71
Discharge: HOME | End: 2021-03-09
Attending: PSYCHIATRY & NEUROLOGY | Admitting: PSYCHIATRY & NEUROLOGY
Payer: COMMERCIAL

## 2021-03-09 DIAGNOSIS — G89.29 CHRONIC NECK PAIN: ICD-10-CM

## 2021-03-09 DIAGNOSIS — F02.80 ALZHEIMER'S DISEASE (H): ICD-10-CM

## 2021-03-09 DIAGNOSIS — M79.7 FIBROMYALGIA: ICD-10-CM

## 2021-03-09 DIAGNOSIS — M54.2 CHRONIC NECK PAIN: ICD-10-CM

## 2021-03-09 DIAGNOSIS — G30.9 ALZHEIMER'S DISEASE (H): ICD-10-CM

## 2021-03-09 DIAGNOSIS — G93.32 CHRONIC FATIGUE SYNDROME: ICD-10-CM

## 2021-03-09 PROCEDURE — 70450 CT HEAD/BRAIN W/O DYE: CPT

## 2021-03-21 ENCOUNTER — RECORDS - HEALTHEAST (OUTPATIENT)
Dept: LAB | Facility: CLINIC | Age: 71
End: 2021-03-21

## 2021-03-23 LAB
ANION GAP SERPL CALCULATED.3IONS-SCNC: 7 MMOL/L (ref 5–18)
BNP SERPL-MCNC: 44 PG/ML (ref 0–120)
BUN SERPL-MCNC: 11 MG/DL (ref 8–28)
CALCIUM SERPL-MCNC: 8.3 MG/DL (ref 8.5–10.5)
CHLORIDE BLD-SCNC: 96 MMOL/L (ref 98–107)
CO2 SERPL-SCNC: 23 MMOL/L (ref 22–31)
CORTIS SERPL-MCNC: 9.3 UG/DL
CREAT SERPL-MCNC: 0.8 MG/DL (ref 0.6–1.1)
GFR SERPL CREATININE-BSD FRML MDRD: >60 ML/MIN/1.73M2
GLUCOSE BLD-MCNC: 121 MG/DL (ref 70–125)
OSMOLALITY SERPL: 269 MOSM/KG (ref 270–300)
POTASSIUM BLD-SCNC: 4.7 MMOL/L (ref 3.5–5)
SODIUM SERPL-SCNC: 126 MMOL/L (ref 136–145)
TSH SERPL DL<=0.005 MIU/L-ACNC: 6.65 UIU/ML (ref 0.3–5)

## 2021-03-30 ENCOUNTER — VIRTUAL VISIT (OUTPATIENT)
Dept: ENDOCRINOLOGY | Facility: CLINIC | Age: 71
End: 2021-03-30
Payer: COMMERCIAL

## 2021-03-30 DIAGNOSIS — E87.1 HYPONATREMIA: Primary | ICD-10-CM

## 2021-03-30 DIAGNOSIS — E03.9 HYPOTHYROIDISM, UNSPECIFIED TYPE: ICD-10-CM

## 2021-03-30 PROCEDURE — 99214 OFFICE O/P EST MOD 30 MIN: CPT | Mod: 95 | Performed by: INTERNAL MEDICINE

## 2021-03-30 RX ORDER — SODIUM CHLORIDE 0.65 %
AEROSOL, SPRAY (ML) NASAL
COMMUNITY
Start: 2021-02-28

## 2021-03-30 NOTE — PROGRESS NOTES
Suzanne is a 70 year old who is being evaluated via a billable video visit.      How would you like to obtain your AVS? Verbally Reviewed  If the video visit is dropped, the invitation should be resent by: Cellphone  Will anyone else be joining your video visit? No      Video Start Time:  3:40 pm      Recent issues:  Hyponatremia follow-up evaluation, after initial evaluation 12/21/20  Today's visit with patient, KEYON Rock, and nurse Rao  Reviewed medical history from patient and Epic chart record  She had labs done 3/23/21 but unclear whether she had an overnight fluid deprivation protocol         6/2015. Fall injury at home, traumatic brain injury.  Had cervical spine fracture, SDH, subsequent memory and balance issues  Previous medical evaluations with Dr. Malik Christensen/Ritesh Duvall    ~2018. Moved to [facility]  Medical evaluation with Shirley Lawson PAC and Dr. Andrade/[facility]  Home visits with Shirley to the [facility] monthly, lab testing also done   Patient activities limited with COVID-19 pandemic    Previous [facility] labs include:      1/3/19 labs included low sodium 133, normal potassium 4.1  Previous HE ([facility]?) labs include:                Walks with cane, due to problems related to her fall injury 2018  Recent symptoms include:   Some fatigue   Periodic migraine headaches   Some balance problems   Ankle swelling L>R   Chronic cough   Occasional low back pains   Denies SOB, nausea, obvious confusion, muscle weakness, irritability   Denies frequent thirst, but fluid intake:    Drinks decaf coffee (6), water (3), diet Coke (1), cranberry juice (3)    No alcohol use   Urination ~6-7/day, 2/night    Recent [facility] labs include:  Lab Results   Component Value Date    TSH 3.78 08/27/2020    T4 0.89 10/23/2014    SG 1.016 05/01/2017     Lab Results   Component Value Date     03/16/2020    POTASSIUM 4.8 10/01/2020    CHLORIDE 105 03/16/2020    CO2 22 03/16/2020    ANIONGAP 6 03/16/2020     GLC 70 10/01/2020    BUN 11 2020    CR 0.77 10/01/2020    GFRESTIMATED >60 10/01/2020    GFRESTBLACK >60 10/01/2020    PRAVIN 9.0 2020    CHOL 127 2020    TRIG 66 2020    HDL 45 (L) 2020    LDL 69 2020    NHDL 55 2017         Lives in [place], [place]  Sees Shirley Lawson [facility] for general medicine evaluations.    PMH/PSH:  Past Medical History:   Diagnosis Date     Allergic state      Anemia due to blood loss, acute      Cervical spine fracture (H)     C1 & C2     Chronic back pain      Dementia (H)      Fibromyalgia      Hypertension      IFG (impaired fasting glucose)      Osteopenia      Subdural hematoma (H)      Substance abuse (H)     ETOH     TBI (traumatic brain injury) (H)      Past Surgical History:   Procedure Laterality Date     ORTHOPEDIC SURGERY       PHACOEMULSIFICATION CLEAR CORNEA WITH STANDARD INTRAOCULAR LENS IMPLANT Right 2017    Procedure: PHACOEMULSIFICATION CLEAR CORNEA WITH STANDARD INTRAOCULAR LENS IMPLANT;  Surgeon: Brianna Christy MD;  Location: [facility]       Family Hx:  Family History   Problem Relation Age of Onset     Alzheimer Disease Mother 58         71         Social Hx:  Social History     Socioeconomic History     Marital status:      Spouse name: Not on file     Number of children: Not on file     Years of education: Not on file     Highest education level: Not on file   Occupational History     Not on file   Social Needs     Financial resource strain: Not on file     Food insecurity     Worry: Not on file     Inability: Not on file     Transportation needs     Medical: Not on file     Non-medical: Not on file   Tobacco Use     Smoking status: Former Smoker     Quit date: 10/23/1985     Years since quittin.4     Smokeless tobacco: Never Used   Substance and Sexual Activity     Alcohol use: Yes     Alcohol/week: 2.0 standard drinks     Types: 2 Shots of liquor per week     Comment: 2  drinks a night     Drug use: No     Sexual activity: Never   Lifestyle     Physical activity     Days per week: Not on file     Minutes per session: Not on file     Stress: Not on file   Relationships     Social connections     Talks on phone: Not on file     Gets together: Not on file     Attends Mosque service: Not on file     Active member of club or organization: Not on file     Attends meetings of clubs or organizations: Not on file     Relationship status: Not on file     Intimate partner violence     Fear of current or ex partner: Not on file     Emotionally abused: Not on file     Physically abused: Not on file     Forced sexual activity: Not on file   Other Topics Concern     Not on file   Social History Narrative    The patient is  for the last 16 years.  She is retired.  She has step-kids by her , but they are not that close. Does talk to step-daughter, Rimma once a week. She is a [employer]  in [place], [place]. She does not smoke.  She quit smoking 15 years ago.  She drinks 2 drinks a night.  She has never been through treatment, but denies any history of alcohol withdrawal. Updated 05/02/17          MEDICATIONS:  has a current medication list which includes the following prescription(s): acetaminophen, vitamin c, azelastine, deep sea nasal spray, fluticasone, montelukast, divalproex sodium delayed-release, fluticasone-salmeterol, folic acid, losartan, meclizine, metoprolol tartrate, mirtazapine, mometasone, omeprazole, sumatriptan, topiramate, and vitamin b-1.      ROS: 10 point ROS neg other than the symptoms noted above in the HPI.    GENERAL: mild fatigue, wt stable; denies fevers, chills, night sweats.    HEENT: no dysphagia, odonophagia, diplopia, neck pain  THYROID:  no apparent hyper or hypothyroid symptoms  CV: no chest pain, pressure, palpitations  LUNGS: cough; no SOB, IVEY, wheezing   ABDOMEN: no diarrhea, constipation, abdominal pain  EXTREMITIES: some ankle  swelling; no rashes, ulcers  NEUROLOGY: headaches, some balance problems, denies changes in vision, tingling, extremitiy numbness   MSK: no muscle aches or pains, weakness  SKIN: no rashes or lesions  : drinks several beverages per day as noted, nocturia 2x/night, no menses  PSYCH:  stable mood, no significant anxiety or depression  ENDOCRINE: no heat or cold intolerance    Physical Exam (visual exam)  VS:  no vital signs taken for video visit  CONSTITUTIONAL: healthy, alert and NAD, well dressed, answering questions appropriately  ENT: no nose swelling or nasal discharge, mouth redness or gum changes.  EYES: eyes grossly normal to inspection, conjunctivae and sclerae normal, no exophthalmos or proptosis  THYROID:  no apparent nodules or goiter  LUNGS: no audible wheeze, cough or visible cyanosis, no visible retractions or increased work of breathing  ABDOMEN: abdomen obese  EXTREMITIES: no hand tremors, limited exam  NEUROLOGY: CN grossly intact, mentation intact and speech normal   SKIN:  no apparent skin lesions, rash, or edema with visualized skin appearance  PSYCH: mentation appears normal, affect normal/bright, judgement and insight intact,   normal speech and appearance well groomed      LABS:    All pertinent notes, labs, and images personally reviewed by me.     A/P:  Encounter Diagnoses   Name Primary?     Hyponatremia Yes     Hypothyroidism, unspecified type        Comments:   Reviewed complicated health history and electrolyte issues. Records indicate low serum sodium levels since 1/2019  Difficult to tell if symptoms related to prior closed head injury.  DDX includes SIADH, CHF, polydipsia.  Previous blood cortisol normal- adrenal insufficiency unlikely  Reviewed and interpreted tests that I previously ordered.   Ordered appropriate tests for the endocrinology disease management.    Management options discussed and implemented after shared medical decision making with the patient.    Plan:  Reviewed  potential causes for low sodium levels (hyponatremia)  Discussed lab tests used to assess sodium metabolism  We discussed the overnight fluid deprivation testing    Recommend:  Unclear whether the 3/24/21 labs were done with fluid deprivation plan  Needs repeat lab tests to assess hyponatremia   Discussed the overnight fluid deprivation protocol, no fluid after bedtime before morning blood and urine testing   Lab testing for BMP, BNP, TSH, blood and urine osmolality   Will fax lab order form to [facility], [phone] (attn nursing dept)  Monitor for symptom changes  Avoid medications that may cause hyponatremia including diuretics, antidepressants, pain medications  Consider starting levothyroxine medication for hypothyroidism management  May need repeat cardiovascular workup such as ECHO to screen for CHF  Consider nephrology consultation     Addressed patient questions today    Total time spent in with the patient evaluation:  20 min  Additional time spent reviewing pertinent lab tests and chart notes, and documentation:  10 min    Follow-up:  4/13/21 at 3pm, Priyanka Erickson MD, MS  Endocrinology  [facility]        Video-Visit Details    Type of service:  Video Visit    Video End Time: 4:00 pm    Originating Location (pt. Location): home    Distant Location (provider location):  [facility]/home     Platform used for Video Visit: Norbert

## 2021-03-31 ENCOUNTER — RECORDS - HEALTHEAST (OUTPATIENT)
Dept: LAB | Facility: CLINIC | Age: 71
End: 2021-03-31

## 2021-03-31 ENCOUNTER — TELEPHONE (OUTPATIENT)
Dept: ENDOCRINOLOGY | Facility: CLINIC | Age: 71
End: 2021-03-31

## 2021-03-31 NOTE — TELEPHONE ENCOUNTER
Message reviewed again.  Patient seen for a follow-up video visit today.  Additional lab testing ordered with the overnight fluid deprivation protocol... lab order form to be faxed to [facility] tomorrow.    ODALYS Erickson MD, MS  [facility]

## 2021-03-31 NOTE — TELEPHONE ENCOUNTER
Reason for Call: Request for an order or referral:    Order or referral being requested: sodium check while not drinking fluids for 12 hours before test, thyroid    Date needed: as soon as possible    Has the patient been seen by the PCP for this problem? YES    Additional comments: Please send orders to:  Fax:  [phone]    Phone number Rao Cao can be reached at:  [phone]    Best Time:  any    Can we leave a detailed message on this number?  YES    Call taken on 3/31/2021 at 8:34 AM by Candace Vidal

## 2021-03-31 NOTE — TELEPHONE ENCOUNTER
Call from [facility] with [facility]. They received fax with lab orders. However, they need the ICD codes in order to draw labs. RN unable to view lab orders in chart. She states they came from Dr. Erickson.     Routing to provider to advise on labs / ICD codes.

## 2021-04-01 ENCOUNTER — TRANSFERRED RECORDS (OUTPATIENT)
Dept: HEALTH INFORMATION MANAGEMENT | Facility: CLINIC | Age: 71
End: 2021-04-01

## 2021-04-01 LAB
ANION GAP SERPL CALCULATED.3IONS-SCNC: 6 MMOL/L (ref 5–18)
BNP SERPL-MCNC: 42 PG/ML (ref 0–120)
BUN SERPL-MCNC: 10 MG/DL (ref 8–28)
CALCIUM SERPL-MCNC: 8.4 MG/DL (ref 8.5–10.5)
CHLORIDE BLD-SCNC: 98 MMOL/L (ref 98–107)
CO2 SERPL-SCNC: 24 MMOL/L (ref 22–31)
CREAT SERPL-MCNC: 0.79 MG/DL (ref 0.6–1.1)
GFR SERPL CREATININE-BSD FRML MDRD: >60 ML/MIN/1.73M2
GLUCOSE BLD-MCNC: 164 MG/DL (ref 70–125)
OSMOLALITY SERPL: 275 MOSM/KG (ref 270–300)
POTASSIUM BLD-SCNC: 4.7 MMOL/L (ref 3.5–5)
SODIUM SERPL-SCNC: 128 MMOL/L (ref 136–145)
TSH SERPL DL<=0.005 MIU/L-ACNC: 4.97 UIU/ML (ref 0.3–5)

## 2021-04-01 NOTE — TELEPHONE ENCOUNTER
Message noted.  I believe we have faxed the lab order form to authorize this testing at [facility].    ODALYS Erickson MD, MS  Endocrinology  [facility]

## 2021-04-02 ENCOUNTER — TELEPHONE (OUTPATIENT)
Dept: ENDOCRINOLOGY | Facility: CLINIC | Age: 71
End: 2021-04-02

## 2021-04-02 NOTE — TELEPHONE ENCOUNTER
[facility] nurse called    They ziyad pt's BMP yesterday     BMP was not fasting as they did it at 11am     They will be faxing results over     Yun FLORES RN

## 2021-04-07 ENCOUNTER — RECORDS - HEALTHEAST (OUTPATIENT)
Dept: LAB | Facility: CLINIC | Age: 71
End: 2021-04-07

## 2021-04-08 LAB — TSH SERPL DL<=0.005 MIU/L-ACNC: 4.56 UIU/ML (ref 0.3–5)

## 2021-04-09 NOTE — TELEPHONE ENCOUNTER
Message noted.  Several days ago, we have resent the patient's Assisted Living lab order form, including the hyponatremia and hypothyroidism ICD-10 codes, as requested.    ODALYS Erickson MD, MS  Endocrinology  [facility]

## 2021-04-09 NOTE — TELEPHONE ENCOUNTER
Message noted.  Unfortunately, it did not clarify whether the patient was still in a fluid-deprivation status when lab tests drawn.    ODALYS Erickson MD, MS  Endocrinology  [facility]

## 2021-04-19 ENCOUNTER — VIRTUAL VISIT (OUTPATIENT)
Dept: ENDOCRINOLOGY | Facility: CLINIC | Age: 71
End: 2021-04-19
Payer: COMMERCIAL

## 2021-04-19 ENCOUNTER — TELEPHONE (OUTPATIENT)
Dept: ENDOCRINOLOGY | Facility: CLINIC | Age: 71
End: 2021-04-19

## 2021-04-19 DIAGNOSIS — E87.1 HYPONATREMIA: Primary | ICD-10-CM

## 2021-04-19 PROCEDURE — 99214 OFFICE O/P EST MOD 30 MIN: CPT | Mod: 95 | Performed by: INTERNAL MEDICINE

## 2021-04-19 NOTE — PROGRESS NOTES
Suzanne is a 70 year old who is being evaluated via a billable video visit.      How would you like to obtain your AVS?  Reviewed verbally  If the video visit is dropped, the invitation should be resent by:  Cell phone to designated number  Will anyone else be joining your video visit?  Nurse Rao      Video Start Time: 3:40 pm     Video-Visit Details    Type of service:  Video Visit    Video End Time: 4:00 pm    Originating Location (pt. Location): [facility]    Distant Location (provider location):  [facility]     Platform used for Video Visit: Kamicat        Recent issues:  Hyponatremia follow-up evaluation, with nurse Yeh  She had labs done 3/23/21 and again 4/1/21.          6/2015. Fall injury at home, traumatic brain injury.  Had cervical spine fracture, SDH, subsequent memory and balance issues  Previous medical evaluations with Dr. Malik Christensen/Ritesh Duvall    ~2018. Moved to [facility]  Medical evaluation with Shirley Lawson PAC and Dr. Andrade/[facility]  Home visits with Shirley to [facility] monthly, lab testing also done   Patient activities limited with COVID-19 pandemic    Previous [facility] labs include:      1/3/19 labs included low sodium 133, normal potassium 4.1  Previous HE ([facility]?) labs include:                Walks with cane, due to problems related to her fall injury 2018  Symptoms include fatigue, periodic migraine HA's, balance problems, ankle swelling, cough, low back pains   No SOB, nausea, obvious confusion, muscle weakness, irritability noted   Denies frequent thirst, but fluid intake:    Drinks decaf coffee (6), water (3), diet Coke (1), cranberry juice (3)    No alcohol use   Urination ~6-7/day, 2/night    Recent [facility] labs include:  Lab Results   Component Value Date    TSH 3.78 08/27/2020    T4 0.89 10/23/2014    SG 1.016 05/01/2017     Lab Results   Component Value Date     03/16/2020    POTASSIUM 4.8 10/01/2020    CHLORIDE 105  2020    CO2 22 2020    ANIONGAP 6 2020    GLC 70 10/01/2020    BUN 11 2020    CR 0.77 10/01/2020    GFRESTIMATED >60 10/01/2020    GFRESTBLACK >60 10/01/2020    PRAVIN 9.0 2020    CHOL 127 2020    TRIG 66 2020    HDL 45 (L) 2020    LDL 69 2020    NHDL 55 2017 labs ([facility]):    Ate muffin and banana vs fruit, glass of juice, cup of decaf coffe  Results:  Na 128 (nl 135-145), K 4.7, Ca 8.4 (nl 8.5-10.5), Cr 0.79, BNP 42 (nl <133), blood osmolal 275 (nl 270- ?), TSH 4.97 (nl 0.3-5.0).         Lives in [place], [place]  Sees [facility] for general medicine evaluations.    PMH/PSH:  Past Medical History:   Diagnosis Date     Allergic state      Anemia due to blood loss, acute      Cervical spine fracture (H)     C1 & C2     Chronic back pain      Dementia (H)      Fibromyalgia      Hypertension      IFG (impaired fasting glucose)      Osteopenia      Subdural hematoma (H)      Substance abuse (H)     ETOH     TBI (traumatic brain injury) (H)      Past Surgical History:   Procedure Laterality Date     ORTHOPEDIC SURGERY       PHACOEMULSIFICATION CLEAR CORNEA WITH STANDARD INTRAOCULAR LENS IMPLANT Right 2017    Procedure: PHACOEMULSIFICATION CLEAR CORNEA WITH STANDARD INTRAOCULAR LENS IMPLANT;  Surgeon: Brianna Christy MD;  Location: [facility]       Family Hx:  Family History   Problem Relation Age of Onset     Alzheimer Disease Mother 58         71         Social Hx:  Social History     Socioeconomic History     Marital status:      Spouse name: Not on file     Number of children: Not on file     Years of education: Not on file     Highest education level: Not on file   Occupational History     Not on file   Social Needs     Financial resource strain: Not on file     Food insecurity     Worry: Not on file     Inability: Not on file     Transportation needs     Medical: Not on file      Non-medical: Not on file   Tobacco Use     Smoking status: Former Smoker     Quit date: 10/23/1985     Years since quittin.5     Smokeless tobacco: Never Used   Substance and Sexual Activity     Alcohol use: Yes     Alcohol/week: 2.0 standard drinks     Types: 2 Shots of liquor per week     Comment: 2 drinks a night     Drug use: No     Sexual activity: Never   Lifestyle     Physical activity     Days per week: Not on file     Minutes per session: Not on file     Stress: Not on file   Relationships     Social connections     Talks on phone: Not on file     Gets together: Not on file     Attends Adventism service: Not on file     Active member of club or organization: Not on file     Attends meetings of clubs or organizations: Not on file     Relationship status: Not on file     Intimate partner violence     Fear of current or ex partner: Not on file     Emotionally abused: Not on file     Physically abused: Not on file     Forced sexual activity: Not on file   Other Topics Concern     Not on file   Social History Narrative    The patient is  for the last 16 years.  She is retired.  She has step-kids by her , but they are not that close. Does talk to step-daughter, Rimma once a week. She is a [employer]  in [place], [place]. She does not smoke.  She quit smoking 15 years ago.  She drinks 2 drinks a night.  She has never been through treatment, but denies any history of alcohol withdrawal. Updated 17          MEDICATIONS:  has a current medication list which includes the following prescription(s): acetaminophen, vitamin c, azelastine, deep sea nasal spray, divalproex sodium delayed-release, fluticasone, fluticasone-salmeterol, folic acid, losartan, meclizine, metoprolol tartrate, mirtazapine, mometasone, montelukast, omeprazole, sumatriptan, topiramate, and vitamin b-1.      ROS: 10 point ROS neg other than the symptoms noted above in the HPI.    GENERAL: mild fatigue, wt stable;  denies fevers, chills, night sweats.    HEENT: no dysphagia, odonophagia, diplopia, neck pain  THYROID:  no apparent hyper or hypothyroid symptoms  CV: no chest pain, pressure, palpitations  LUNGS: cough; no SOB, IVEY, wheezing   ABDOMEN: no diarrhea, constipation, abdominal pain  EXTREMITIES: some ankle swelling; no rashes, ulcers  NEUROLOGY: headaches, some balance problems, denies changes in vision, tingling, extremitiy numbness   MSK: no muscle aches or pains, weakness  SKIN: no rashes or lesions  : drinks several beverages per day as noted, nocturia 2x/night, no menses  PSYCH:  stable mood, no significant anxiety or depression  ENDOCRINE: no heat or cold intolerance    Physical Exam (visual exam)  VS:  no vital signs taken for video visit  CONSTITUTIONAL: healthy, alert and NAD, well dressed, answering questions appropriately  ENT: no nose swelling or nasal discharge, mouth redness or gum changes.  EYES: eyes grossly normal to inspection, conjunctivae and sclerae normal, no exophthalmos or proptosis  THYROID:  no apparent nodules or goiter  LUNGS: no audible wheeze, cough or visible cyanosis, no visible retractions or increased work of breathing  ABDOMEN: abdomen obese  EXTREMITIES: no hand tremors, limited exam  NEUROLOGY: CN grossly intact, mentation intact and speech normal   SKIN:  no apparent skin lesions, rash, or edema with visualized skin appearance  PSYCH: mentation appears normal, affect normal/bright, judgement and insight intact,   normal speech and appearance well groomed      LABS:    All pertinent notes, labs, and images personally reviewed by me.     A/P:  Encounter Diagnosis   Name Primary?     Hyponatremia Yes       Comments:   Reviewed complicated health history and electrolyte issues. Records indicate low serum sodium levels since 1/2019  Difficult to tell if symptoms related to prior closed head injury.  DDX includes SIADH, CHF, polydipsia.  Previous blood cortisol normal- adrenal  insufficiency unlikely  Suspect valproic acid (Depakote) medication may be the cause  Reviewed and interpreted tests that I previously ordered.   Ordered appropriate tests for the endocrinology disease management.    Management options discussed and implemented after shared medical decision making with the patient.    Plan:  Reviewed potential causes for low sodium levels (hyponatremia)  Discussed lab tests used to assess sodium metabolism  We discussed the overnight fluid deprivation testing    Recommend:  Unclear whether the 3/24/21 labs were done with fluid deprivation plan  Persistent low sodium 4/1/21 and minimal morning fluid intake with breakfast meal that day   Will fax lab order form  Monitor for symptom changes  Avoid medications that may cause hyponatremia including diuretics, antidepressants, pain medications  Depakote med the likely cause for the hyponatremia, recommend discontinuing this medication   Will relay recommendation to PCP for this plan   Patient may need alternative migraine headache med treatment   Repeat sodium with BMP testing in 4-6 weeks after stopping Depakote  Needs periodic TSH testing to screen for hypothyroidism, recheck in 6-mo  Consider nephrology consultation  Will send copy of this progress note to [facility], (attn nursing dept)     Addressed patient questions today    Total time spent in with the patient evaluation:  25 min  Additional time spent reviewing pertinent lab tests and chart notes, and documentation:  5 min    Follow-up:  renetta Erickson MD, MS  Endocrinology  [facility]    CC:   SANGEETHA Lawson,   [facility] nursing dept (fax [phone])

## 2021-04-19 NOTE — TELEPHONE ENCOUNTER
Depakote was previously prescribed by Jacinda Gamboa with [facility]    [facility] ph: [phone]    CallerRao from [facility] ph: [phone]

## 2021-04-19 NOTE — LETTER
4/19/2021         RE: Melissa Jean Penrose  [address] [address]  [facility] [address]        Dear Colleague,    Thank you for referring your patient, Melissa Jean Penrose, to the [facility]. Please see a copy of my visit note below.    Suzanne is a 70 year old who is being evaluated via a billable video visit.      How would you like to obtain your AVS?  Reviewed verbally  If the video visit is dropped, the invitation should be resent by:  Cell phone to designated number  Will anyone else be joining your video visit?  Nurse Yeh      Video Start Time: 3:40 pm     Video-Visit Details    Type of service:  Video Visit    Video End Time: 4:00 pm    Originating Location (pt. Location): [facility]    Distant Location (provider location):  [facility]     Platform used for Video Visit: DoxkiranBaboo      Suzanne is a 70 year old who is being evaluated via a billable video visit.      How would you like to obtain your AVS? Verbally Reviewed  If the video visit is dropped, the invitation should be resent by: Cellphone  Will anyone else be joining your video visit? No      Video Start Time:  3:40 pm      Recent issues:  Hyponatremia follow-up evaluation, with nurse Yeh  She had labs done 3/23/21 and again 4/1/21.          6/2015. Fall injury at home, traumatic brain injury.  Had cervical spine fracture, SDH, subsequent memory and balance issues  Previous medical evaluations with Dr. Malik Christensen/Ritesh Duvall    ~2018. Moved to [facility]  Medical evaluation with Shirley Lawson PAC and Dr. Andrade/[facility]  Home visits with Shirley to the [facility] monthly, lab testing also done   Patient activities limited with COVID-19 pandemic    Previous [facility] labs include:      1/3/19 labs included low sodium 133, normal potassium 4.1  Previous HE ([facility]?) labs include:                Walks with cane, due to problems related to her fall injury 2018  Symptoms include fatigue,  periodic migraine HA's, balance problems, ankle swelling, cough, low back pains   No SOB, nausea, obvious confusion, muscle weakness, irritability noted   Denies frequent thirst, but fluid intake:    Drinks decaf coffee (6), water (3), diet Coke (1), cranberry juice (3)    No alcohol use   Urination ~6-7/day, 2/night    Recent [facility] labs include:  Lab Results   Component Value Date    TSH 3.78 08/27/2020    T4 0.89 10/23/2014    SG 1.016 05/01/2017     Lab Results   Component Value Date     03/16/2020    POTASSIUM 4.8 10/01/2020    CHLORIDE 105 03/16/2020    CO2 22 03/16/2020    ANIONGAP 6 03/16/2020    GLC 70 10/01/2020    BUN 11 03/16/2020    CR 0.77 10/01/2020    GFRESTIMATED >60 10/01/2020    GFRESTBLACK >60 10/01/2020    PRAVIN 9.0 03/16/2020    CHOL 127 08/27/2020    TRIG 66 08/27/2020    HDL 45 (L) 08/27/2020    LDL 69 08/27/2020    NHDL 55 04/27/2017 4/1/21 labs ([facility]):    Ate muffin and banana vs fruit, glass of juice, cup of decaf coffe  Results:  Na 128 (nl 135-145), K 4.7, Ca 8.4 (nl 8.5-10.5), Cr 0.79, BNP 42 (nl <133), blood osmolal 275 (nl 270- ?), TSH 4.97 (nl 0.3-5.0).         Lives in [place], [place]  Sees Jacinda [facility] for general medicine evaluations.    PMH/PSH:  Past Medical History:   Diagnosis Date     Allergic state      Anemia due to blood loss, acute      Cervical spine fracture (H) 2015    C1 & C2     Chronic back pain      Dementia (H)      Fibromyalgia      Hypertension      IFG (impaired fasting glucose)      Osteopenia      Subdural hematoma (H) 2015     Substance abuse (H)     ETOH     TBI (traumatic brain injury) (H)      Past Surgical History:   Procedure Laterality Date     ORTHOPEDIC SURGERY       PHACOEMULSIFICATION CLEAR CORNEA WITH STANDARD INTRAOCULAR LENS IMPLANT Right 2/7/2017    Procedure: PHACOEMULSIFICATION CLEAR CORNEA WITH STANDARD INTRAOCULAR LENS IMPLANT;  Surgeon: Brianna Christy MD;  Location: [facility]  Hx:  Family History   Problem Relation Age of Onset     Alzheimer Disease Mother 58         71         Social Hx:  Social History     Socioeconomic History     Marital status:      Spouse name: Not on file     Number of children: Not on file     Years of education: Not on file     Highest education level: Not on file   Occupational History     Not on file   Social Needs     Financial resource strain: Not on file     Food insecurity     Worry: Not on file     Inability: Not on file     Transportation needs     Medical: Not on file     Non-medical: Not on file   Tobacco Use     Smoking status: Former Smoker     Quit date: 10/23/1985     Years since quittin.5     Smokeless tobacco: Never Used   Substance and Sexual Activity     Alcohol use: Yes     Alcohol/week: 2.0 standard drinks     Types: 2 Shots of liquor per week     Comment: 2 drinks a night     Drug use: No     Sexual activity: Never   Lifestyle     Physical activity     Days per week: Not on file     Minutes per session: Not on file     Stress: Not on file   Relationships     Social connections     Talks on phone: Not on file     Gets together: Not on file     Attends Mandaen service: Not on file     Active member of club or organization: Not on file     Attends meetings of clubs or organizations: Not on file     Relationship status: Not on file     Intimate partner violence     Fear of current or ex partner: Not on file     Emotionally abused: Not on file     Physically abused: Not on file     Forced sexual activity: Not on file   Other Topics Concern     Not on file   Social History Narrative    The patient is  for the last 16 years.  She is retired.  She has step-kids by her , but they are not that close. Does talk to step-daughter, Rimma once a week. She is a [employer]  in [place], [place]. She does not smoke.  She quit smoking 15 years ago.  She drinks 2 drinks a night.  She has never been through treatment, but  denies any history of alcohol withdrawal. Updated 05/02/17          MEDICATIONS:  has a current medication list which includes the following prescription(s): acetaminophen, vitamin c, azelastine, deep sea nasal spray, divalproex sodium delayed-release, fluticasone, fluticasone-salmeterol, folic acid, losartan, meclizine, metoprolol tartrate, mirtazapine, mometasone, montelukast, omeprazole, sumatriptan, topiramate, and vitamin b-1.      ROS: 10 point ROS neg other than the symptoms noted above in the HPI.    GENERAL: mild fatigue, wt stable; denies fevers, chills, night sweats.    HEENT: no dysphagia, odonophagia, diplopia, neck pain  THYROID:  no apparent hyper or hypothyroid symptoms  CV: no chest pain, pressure, palpitations  LUNGS: cough; no SOB, IVEY, wheezing   ABDOMEN: no diarrhea, constipation, abdominal pain  EXTREMITIES: some ankle swelling; no rashes, ulcers  NEUROLOGY: headaches, some balance problems, denies changes in vision, tingling, extremitiy numbness   MSK: no muscle aches or pains, weakness  SKIN: no rashes or lesions  : drinks several beverages per day as noted, nocturia 2x/night, no menses  PSYCH:  stable mood, no significant anxiety or depression  ENDOCRINE: no heat or cold intolerance    Physical Exam (visual exam)  VS:  no vital signs taken for video visit  CONSTITUTIONAL: healthy, alert and NAD, well dressed, answering questions appropriately  ENT: no nose swelling or nasal discharge, mouth redness or gum changes.  EYES: eyes grossly normal to inspection, conjunctivae and sclerae normal, no exophthalmos or proptosis  THYROID:  no apparent nodules or goiter  LUNGS: no audible wheeze, cough or visible cyanosis, no visible retractions or increased work of breathing  ABDOMEN: abdomen obese  EXTREMITIES: no hand tremors, limited exam  NEUROLOGY: CN grossly intact, mentation intact and speech normal   SKIN:  no apparent skin lesions, rash, or edema with visualized skin appearance  PSYCH:  mentation appears normal, affect normal/bright, judgement and insight intact,   normal speech and appearance well groomed      LABS:    All pertinent notes, labs, and images personally reviewed by me.     A/P:  Encounter Diagnosis   Name Primary?     Hyponatremia Yes       Comments:   Reviewed complicated health history and electrolyte issues. Records indicate low serum sodium levels since 1/2019  Difficult to tell if symptoms related to prior closed head injury.  DDX includes SIADH, CHF, polydipsia.  Previous blood cortisol normal- adrenal insufficiency unlikely  Suspect valproic acid (Depakote) medication may be the cause  Reviewed and interpreted tests that I previously ordered.   Ordered appropriate tests for the endocrinology disease management.    Management options discussed and implemented after shared medical decision making with the patient.    Plan:  Reviewed potential causes for low sodium levels (hyponatremia)  Discussed lab tests used to assess sodium metabolism  We discussed the overnight fluid deprivation testing    Recommend:  Unclear whether the 3/24/21 labs were done with fluid deprivation plan  Persistent low sodium 4/1/21 and minimal morning fluid intake with breakfast meal that day   Will fax lab order form  Monitor for symptom changes  Avoid medications that may cause hyponatremia including diuretics, antidepressants, pain medications  Depakote med the likely cause for the hyponatremia, recommend discontinuing this medication   Will relay recommendation to PCP for this plan   Patient may need alternative migraine headache med treatment   Repeat sodium with BMP testing in 4-6 weeks after stopping Depakote  Needs periodic TSH testing to screen for hypothyroidism, recheck in 6-mo  Consider nephrology consultation  Will send copy of this progress note to [facility], (attn nursing dept)     Addressed patient questions today    Total time spent in with the patient evaluation:  25  min  Additional time spent reviewing pertinent lab tests and chart notes, and documentation:  5 min    Follow-up:  renetta Erickson MD, MS  Endocrinology  [facility]    CC:   SANGEETHA Lawson,   [facility] nursing dept (fax [phone])                      Again, thank you for allowing me to participate in the care of your patient.        Sincerely,        Curtis Erickson MD

## 2021-05-05 ENCOUNTER — RECORDS - HEALTHEAST (OUTPATIENT)
Dept: LAB | Facility: CLINIC | Age: 71
End: 2021-05-05

## 2021-05-06 LAB — TSH SERPL DL<=0.005 MIU/L-ACNC: 4.12 UIU/ML (ref 0.3–5)

## 2021-05-09 ENCOUNTER — HEALTH MAINTENANCE LETTER (OUTPATIENT)
Age: 71
End: 2021-05-09

## 2021-05-19 ENCOUNTER — HOSPITAL ENCOUNTER (OUTPATIENT)
Dept: CT IMAGING | Facility: CLINIC | Age: 71
Discharge: HOME | End: 2021-05-19
Attending: FAMILY MEDICINE | Admitting: FAMILY MEDICINE
Payer: COMMERCIAL

## 2021-05-19 DIAGNOSIS — R91.1 LUNG NODULE: ICD-10-CM

## 2021-05-19 PROCEDURE — 71250 CT THORAX DX C-: CPT

## 2021-06-17 NOTE — TELEPHONE ENCOUNTER
Message reviewed again, though unclear why they are notifying me about Depakote medication.  I will fax my last patient progress note to [facility], for her PCP.    ODALYS Erickson MD, MS  Endocrinology  [facility]

## 2021-07-05 ENCOUNTER — RECORDS - HEALTHEAST (OUTPATIENT)
Dept: LAB | Facility: CLINIC | Age: 71
End: 2021-07-05

## 2021-07-06 LAB
ANION GAP SERPL CALCULATED.3IONS-SCNC: 10 MMOL/L (ref 5–18)
BUN SERPL-MCNC: 9 MG/DL (ref 8–28)
CALCIUM SERPL-MCNC: 9.3 MG/DL (ref 8.5–10.5)
CHLORIDE BLD-SCNC: 101 MMOL/L (ref 98–107)
CO2 SERPL-SCNC: 21 MMOL/L (ref 22–31)
CREAT SERPL-MCNC: 0.81 MG/DL (ref 0.6–1.1)
GFR SERPL CREATININE-BSD FRML MDRD: >60 ML/MIN/1.73M2
GLUCOSE BLD-MCNC: 125 MG/DL (ref 70–125)
POTASSIUM BLD-SCNC: 4.5 MMOL/L (ref 3.5–5)
SODIUM SERPL-SCNC: 132 MMOL/L (ref 136–145)

## 2021-10-24 ENCOUNTER — HEALTH MAINTENANCE LETTER (OUTPATIENT)
Age: 71
End: 2021-10-24

## 2022-01-05 ENCOUNTER — LAB REQUISITION (OUTPATIENT)
Dept: LAB | Facility: CLINIC | Age: 72
End: 2022-01-05
Payer: COMMERCIAL

## 2022-01-05 DIAGNOSIS — I10 ESSENTIAL (PRIMARY) HYPERTENSION: ICD-10-CM

## 2022-01-05 DIAGNOSIS — F10.21 ALCOHOL DEPENDENCE, IN REMISSION (H): ICD-10-CM

## 2022-01-05 DIAGNOSIS — E03.9 HYPOTHYROIDISM, UNSPECIFIED: ICD-10-CM

## 2022-01-06 LAB
ERYTHROCYTE [DISTWIDTH] IN BLOOD BY AUTOMATED COUNT: 14.1 % (ref 10–15)
HCT VFR BLD AUTO: 36.5 % (ref 35–47)
HGB BLD-MCNC: 12.2 G/DL (ref 11.7–15.7)
MCH RBC QN AUTO: 28.8 PG (ref 26.5–33)
MCHC RBC AUTO-ENTMCNC: 33.4 G/DL (ref 31.5–36.5)
MCV RBC AUTO: 86 FL (ref 78–100)
PLATELET # BLD AUTO: 459 10E3/UL (ref 150–450)
RBC # BLD AUTO: 4.24 10E6/UL (ref 3.8–5.2)
WBC # BLD AUTO: 12.8 10E3/UL (ref 4–11)

## 2022-01-06 PROCEDURE — 85027 COMPLETE CBC AUTOMATED: CPT | Mod: ORL | Performed by: FAMILY MEDICINE

## 2022-01-06 PROCEDURE — 84443 ASSAY THYROID STIM HORMONE: CPT | Mod: ORL | Performed by: FAMILY MEDICINE

## 2022-01-06 PROCEDURE — 80053 COMPREHEN METABOLIC PANEL: CPT | Mod: ORL | Performed by: FAMILY MEDICINE

## 2022-01-06 PROCEDURE — 83036 HEMOGLOBIN GLYCOSYLATED A1C: CPT | Mod: ORL | Performed by: FAMILY MEDICINE

## 2022-01-06 PROCEDURE — 36415 COLL VENOUS BLD VENIPUNCTURE: CPT | Mod: ORL | Performed by: FAMILY MEDICINE

## 2022-01-07 LAB
ALBUMIN SERPL-MCNC: 3.2 G/DL (ref 3.5–5)
ALP SERPL-CCNC: 119 U/L (ref 45–120)
ALT SERPL W P-5'-P-CCNC: 24 U/L (ref 0–45)
ANION GAP SERPL CALCULATED.3IONS-SCNC: 15 MMOL/L (ref 5–18)
AST SERPL W P-5'-P-CCNC: 20 U/L (ref 0–40)
BILIRUB SERPL-MCNC: 0.4 MG/DL (ref 0–1)
BUN SERPL-MCNC: 11 MG/DL (ref 8–28)
CALCIUM SERPL-MCNC: 9 MG/DL (ref 8.5–10.5)
CHLORIDE BLD-SCNC: 89 MMOL/L (ref 98–107)
CO2 SERPL-SCNC: 26 MMOL/L (ref 22–31)
CREAT SERPL-MCNC: 0.76 MG/DL (ref 0.6–1.1)
GFR SERPL CREATININE-BSD FRML MDRD: 83 ML/MIN/1.73M2
GLUCOSE BLD-MCNC: 93 MG/DL (ref 70–125)
HBA1C MFR BLD: 6.5 %
POTASSIUM BLD-SCNC: 3.3 MMOL/L (ref 3.5–5)
PROT SERPL-MCNC: 6.9 G/DL (ref 6–8)
SODIUM SERPL-SCNC: 130 MMOL/L (ref 136–145)
TSH SERPL DL<=0.005 MIU/L-ACNC: 2.02 UIU/ML (ref 0.3–5)

## 2022-01-12 ENCOUNTER — LAB REQUISITION (OUTPATIENT)
Dept: LAB | Facility: CLINIC | Age: 72
End: 2022-01-12
Payer: COMMERCIAL

## 2022-01-12 DIAGNOSIS — E87.6 HYPOKALEMIA: ICD-10-CM

## 2022-01-13 LAB — POTASSIUM BLD-SCNC: 3.1 MMOL/L (ref 3.5–5)

## 2022-01-13 PROCEDURE — 36415 COLL VENOUS BLD VENIPUNCTURE: CPT | Mod: ORL | Performed by: FAMILY MEDICINE

## 2022-01-13 PROCEDURE — 84132 ASSAY OF SERUM POTASSIUM: CPT | Mod: ORL | Performed by: FAMILY MEDICINE

## 2022-01-26 ENCOUNTER — LAB REQUISITION (OUTPATIENT)
Dept: LAB | Facility: CLINIC | Age: 72
End: 2022-01-26
Payer: COMMERCIAL

## 2022-01-27 LAB — POTASSIUM BLD-SCNC: 3.2 MMOL/L (ref 3.5–5)

## 2022-01-27 PROCEDURE — 36415 COLL VENOUS BLD VENIPUNCTURE: CPT | Mod: ORL | Performed by: FAMILY MEDICINE

## 2022-01-27 PROCEDURE — 84132 ASSAY OF SERUM POTASSIUM: CPT | Mod: ORL | Performed by: FAMILY MEDICINE

## 2022-01-27 PROCEDURE — P9604 ONE-WAY ALLOW PRORATED TRIP: HCPCS | Mod: ORL | Performed by: FAMILY MEDICINE

## 2022-02-08 ENCOUNTER — LAB REQUISITION (OUTPATIENT)
Dept: LAB | Facility: CLINIC | Age: 72
End: 2022-02-08
Payer: COMMERCIAL

## 2022-02-08 DIAGNOSIS — E87.5 HYPERKALEMIA: ICD-10-CM

## 2022-02-09 LAB — POTASSIUM BLD-SCNC: 3.5 MMOL/L (ref 3.5–5)

## 2022-02-09 PROCEDURE — 36415 COLL VENOUS BLD VENIPUNCTURE: CPT | Mod: ORL | Performed by: FAMILY MEDICINE

## 2022-02-09 PROCEDURE — 84132 ASSAY OF SERUM POTASSIUM: CPT | Mod: ORL | Performed by: FAMILY MEDICINE

## 2022-02-09 PROCEDURE — P9604 ONE-WAY ALLOW PRORATED TRIP: HCPCS | Mod: ORL | Performed by: FAMILY MEDICINE

## 2022-02-14 ENCOUNTER — LAB REQUISITION (OUTPATIENT)
Dept: LAB | Facility: CLINIC | Age: 72
End: 2022-02-14
Payer: COMMERCIAL

## 2022-02-15 LAB — POTASSIUM BLD-SCNC: 3.9 MMOL/L (ref 3.5–5)

## 2022-02-15 PROCEDURE — 36415 COLL VENOUS BLD VENIPUNCTURE: CPT | Mod: ORL | Performed by: FAMILY MEDICINE

## 2022-02-15 PROCEDURE — 84132 ASSAY OF SERUM POTASSIUM: CPT | Mod: ORL | Performed by: FAMILY MEDICINE

## 2022-02-15 PROCEDURE — P9603 ONE-WAY ALLOW PRORATED MILES: HCPCS | Mod: ORL | Performed by: FAMILY MEDICINE

## 2022-03-10 ENCOUNTER — LAB REQUISITION (OUTPATIENT)
Dept: LAB | Facility: CLINIC | Age: 72
End: 2022-03-10
Payer: COMMERCIAL

## 2022-03-10 LAB
FLUAV AG SPEC QL IA: NEGATIVE
FLUBV AG SPEC QL IA: NEGATIVE

## 2022-03-10 PROCEDURE — 87804 INFLUENZA ASSAY W/OPTIC: CPT | Mod: ORL | Performed by: FAMILY MEDICINE

## 2022-03-23 ENCOUNTER — LAB REQUISITION (OUTPATIENT)
Dept: LAB | Facility: CLINIC | Age: 72
End: 2022-03-23
Payer: COMMERCIAL

## 2022-03-23 DIAGNOSIS — E87.6 HYPOKALEMIA: ICD-10-CM

## 2022-03-24 LAB — POTASSIUM BLD-SCNC: 3.9 MMOL/L (ref 3.5–5)

## 2022-03-24 PROCEDURE — 36415 COLL VENOUS BLD VENIPUNCTURE: CPT | Mod: ORL | Performed by: INTERNAL MEDICINE

## 2022-03-24 PROCEDURE — 84132 ASSAY OF SERUM POTASSIUM: CPT | Mod: ORL | Performed by: INTERNAL MEDICINE

## 2022-03-24 PROCEDURE — P9604 ONE-WAY ALLOW PRORATED TRIP: HCPCS | Mod: ORL | Performed by: INTERNAL MEDICINE

## 2022-04-10 ENCOUNTER — HEALTH MAINTENANCE LETTER (OUTPATIENT)
Age: 72
End: 2022-04-10

## 2022-06-05 ENCOUNTER — HEALTH MAINTENANCE LETTER (OUTPATIENT)
Age: 72
End: 2022-06-05

## 2022-10-15 ENCOUNTER — HEALTH MAINTENANCE LETTER (OUTPATIENT)
Age: 72
End: 2022-10-15

## 2022-10-24 ENCOUNTER — LAB REQUISITION (OUTPATIENT)
Dept: LAB | Facility: CLINIC | Age: 72
End: 2022-10-24
Payer: COMMERCIAL

## 2022-10-24 DIAGNOSIS — R73.03 PREDIABETES: ICD-10-CM

## 2022-10-24 DIAGNOSIS — E87.1 HYPO-OSMOLALITY AND HYPONATREMIA: ICD-10-CM

## 2022-10-24 DIAGNOSIS — E03.9 HYPOTHYROIDISM, UNSPECIFIED: ICD-10-CM

## 2022-10-24 DIAGNOSIS — I10 ESSENTIAL (PRIMARY) HYPERTENSION: ICD-10-CM

## 2022-10-24 DIAGNOSIS — E87.6 HYPOKALEMIA: ICD-10-CM

## 2022-10-27 LAB
ALBUMIN SERPL BCG-MCNC: 3.3 G/DL (ref 3.5–5.2)
ANION GAP SERPL CALCULATED.3IONS-SCNC: 12 MMOL/L (ref 7–15)
BUN SERPL-MCNC: 10.4 MG/DL (ref 8–23)
CALCIUM SERPL-MCNC: 9.3 MG/DL (ref 8.8–10.2)
CHLORIDE SERPL-SCNC: 99 MMOL/L (ref 98–107)
CREAT SERPL-MCNC: 0.71 MG/DL (ref 0.51–0.95)
DEPRECATED HCO3 PLAS-SCNC: 23 MMOL/L (ref 22–29)
ERYTHROCYTE [DISTWIDTH] IN BLOOD BY AUTOMATED COUNT: 14 % (ref 10–15)
GFR SERPL CREATININE-BSD FRML MDRD: 90 ML/MIN/1.73M2
GLUCOSE SERPL-MCNC: 110 MG/DL (ref 70–99)
HBA1C MFR BLD: 6.4 %
HCT VFR BLD AUTO: 38.5 % (ref 35–47)
HGB BLD-MCNC: 12.2 G/DL (ref 11.7–15.7)
MCH RBC QN AUTO: 27.2 PG (ref 26.5–33)
MCHC RBC AUTO-ENTMCNC: 31.7 G/DL (ref 31.5–36.5)
MCV RBC AUTO: 86 FL (ref 78–100)
PHOSPHATE SERPL-MCNC: 3.2 MG/DL (ref 2.5–4.5)
PLATELET # BLD AUTO: 423 10E3/UL (ref 150–450)
POTASSIUM SERPL-SCNC: 3.6 MMOL/L (ref 3.4–5.3)
RBC # BLD AUTO: 4.49 10E6/UL (ref 3.8–5.2)
SODIUM SERPL-SCNC: 134 MMOL/L (ref 136–145)
TSH SERPL DL<=0.005 MIU/L-ACNC: 2.67 UIU/ML (ref 0.3–4.2)
WBC # BLD AUTO: 11.2 10E3/UL (ref 4–11)

## 2022-10-27 PROCEDURE — 36415 COLL VENOUS BLD VENIPUNCTURE: CPT | Mod: ORL | Performed by: FAMILY MEDICINE

## 2022-10-27 PROCEDURE — 80069 RENAL FUNCTION PANEL: CPT | Mod: ORL | Performed by: FAMILY MEDICINE

## 2022-10-27 PROCEDURE — 83036 HEMOGLOBIN GLYCOSYLATED A1C: CPT | Mod: ORL | Performed by: FAMILY MEDICINE

## 2022-10-27 PROCEDURE — P9604 ONE-WAY ALLOW PRORATED TRIP: HCPCS | Mod: ORL | Performed by: FAMILY MEDICINE

## 2022-10-27 PROCEDURE — 84443 ASSAY THYROID STIM HORMONE: CPT | Mod: ORL | Performed by: FAMILY MEDICINE

## 2022-10-27 PROCEDURE — 85027 COMPLETE CBC AUTOMATED: CPT | Mod: ORL | Performed by: FAMILY MEDICINE

## 2022-11-04 NOTE — ANESTHESIA PREPROCEDURE EVALUATION
Anesthesia Evaluation     . Pt has had prior anesthetic. Type: General and MAC    No history of anesthetic complications     ROS/MED HX    ENT/Pulmonary:  - neg pulmonary ROS     Neurologic: Comment: Previous TBI    (+)migraines,     Cardiovascular:     (+) hypertension----. : . . . :. .      (-) CAD and CHF   METS/Exercise Tolerance:  >4 METS   Hematologic:         Musculoskeletal:         GI/Hepatic:  - neg GI/hepatic ROS       Renal/Genitourinary:      (-) renal disease   Endo:      (-) Type II DM   Psychiatric:     (+) psychiatric history other (comment) (ETOH abuse)      Infectious Disease:         Malignancy:         Other:               Physical Exam  Normal systems: dental    Airway   Mallampati: II  TM distance: >3 FB  Neck ROM: full    Dental     Cardiovascular   Rhythm and rate: regular and normal      Pulmonary    breath sounds clear to auscultation                    Anesthesia Plan      History & Physical Review  History and physical reviewed and following examination; no interval change.    ASA Status:  2 .    NPO Status:  > 8 hours    Plan for MAC Reason for MAC:  Procedure to face, neck, head or breast         Postoperative Care      Consents  Anesthetic plan, risks, benefits and alternatives discussed with:  Patient..                          .   Tranexamic Acid Pregnancy And Lactation Text: It is unknown if this medication is safe during pregnancy or breast feeding.

## 2023-01-16 ENCOUNTER — LAB REQUISITION (OUTPATIENT)
Dept: LAB | Facility: CLINIC | Age: 73
End: 2023-01-16
Payer: COMMERCIAL

## 2023-01-16 DIAGNOSIS — R73.03 PREDIABETES: ICD-10-CM

## 2023-01-16 DIAGNOSIS — R23.3 SPONTANEOUS ECCHYMOSES: ICD-10-CM

## 2023-01-16 DIAGNOSIS — K76.0 FATTY (CHANGE OF) LIVER, NOT ELSEWHERE CLASSIFIED: ICD-10-CM

## 2023-01-19 LAB
ALBUMIN SERPL BCG-MCNC: 3.7 G/DL (ref 3.5–5.2)
ALP SERPL-CCNC: 129 U/L (ref 35–104)
ALT SERPL W P-5'-P-CCNC: 30 U/L (ref 10–35)
ANION GAP SERPL CALCULATED.3IONS-SCNC: 16 MMOL/L (ref 7–15)
AST SERPL W P-5'-P-CCNC: 37 U/L (ref 10–35)
BASOPHILS # BLD AUTO: 0 10E3/UL (ref 0–0.2)
BASOPHILS NFR BLD AUTO: 0 %
BILIRUB SERPL-MCNC: 0.3 MG/DL
BUN SERPL-MCNC: 12.3 MG/DL (ref 8–23)
CALCIUM SERPL-MCNC: 9.6 MG/DL (ref 8.8–10.2)
CHLORIDE SERPL-SCNC: 97 MMOL/L (ref 98–107)
CREAT SERPL-MCNC: 0.76 MG/DL (ref 0.51–0.95)
DEPRECATED HCO3 PLAS-SCNC: 19 MMOL/L (ref 22–29)
EOSINOPHIL # BLD AUTO: 0 10E3/UL (ref 0–0.7)
EOSINOPHIL NFR BLD AUTO: 0 %
ERYTHROCYTE [DISTWIDTH] IN BLOOD BY AUTOMATED COUNT: 14.4 % (ref 10–15)
GFR SERPL CREATININE-BSD FRML MDRD: 83 ML/MIN/1.73M2
GLUCOSE SERPL-MCNC: 161 MG/DL (ref 70–99)
HBA1C MFR BLD: 6.5 %
HCT VFR BLD AUTO: 41.4 % (ref 35–47)
HGB BLD-MCNC: 13.1 G/DL (ref 11.7–15.7)
IMM GRANULOCYTES # BLD: 0.2 10E3/UL
IMM GRANULOCYTES NFR BLD: 1 %
LYMPHOCYTES # BLD AUTO: 0.9 10E3/UL (ref 0.8–5.3)
LYMPHOCYTES NFR BLD AUTO: 6 %
MCH RBC QN AUTO: 27.5 PG (ref 26.5–33)
MCHC RBC AUTO-ENTMCNC: 31.6 G/DL (ref 31.5–36.5)
MCV RBC AUTO: 87 FL (ref 78–100)
MONOCYTES # BLD AUTO: 0.3 10E3/UL (ref 0–1.3)
MONOCYTES NFR BLD AUTO: 2 %
NEUTROPHILS # BLD AUTO: 13.7 10E3/UL (ref 1.6–8.3)
NEUTROPHILS NFR BLD AUTO: 91 %
NRBC # BLD AUTO: 0 10E3/UL
NRBC BLD AUTO-RTO: 0 /100
PLATELET # BLD AUTO: 447 10E3/UL (ref 150–450)
POTASSIUM SERPL-SCNC: 4.2 MMOL/L (ref 3.4–5.3)
PROT SERPL-MCNC: 7.6 G/DL (ref 6.4–8.3)
RBC # BLD AUTO: 4.77 10E6/UL (ref 3.8–5.2)
SODIUM SERPL-SCNC: 132 MMOL/L (ref 136–145)
WBC # BLD AUTO: 15.2 10E3/UL (ref 4–11)

## 2023-01-19 PROCEDURE — 80053 COMPREHEN METABOLIC PANEL: CPT | Mod: ORL | Performed by: FAMILY MEDICINE

## 2023-01-19 PROCEDURE — 85025 COMPLETE CBC W/AUTO DIFF WBC: CPT | Mod: ORL | Performed by: FAMILY MEDICINE

## 2023-01-19 PROCEDURE — 83036 HEMOGLOBIN GLYCOSYLATED A1C: CPT | Mod: ORL | Performed by: FAMILY MEDICINE

## 2023-01-19 PROCEDURE — 36415 COLL VENOUS BLD VENIPUNCTURE: CPT | Mod: ORL | Performed by: FAMILY MEDICINE

## 2023-06-07 ENCOUNTER — LAB REQUISITION (OUTPATIENT)
Dept: LAB | Facility: CLINIC | Age: 73
End: 2023-06-07
Payer: COMMERCIAL

## 2023-06-07 DIAGNOSIS — R73.03 PREDIABETES: ICD-10-CM

## 2023-06-07 DIAGNOSIS — E87.6 HYPOKALEMIA: ICD-10-CM

## 2023-06-07 DIAGNOSIS — I10 ESSENTIAL (PRIMARY) HYPERTENSION: ICD-10-CM

## 2023-06-08 LAB
ALBUMIN SERPL BCG-MCNC: 3.7 G/DL (ref 3.5–5.2)
ANION GAP SERPL CALCULATED.3IONS-SCNC: 12 MMOL/L (ref 7–15)
BUN SERPL-MCNC: 12.7 MG/DL (ref 8–23)
CALCIUM SERPL-MCNC: 9.3 MG/DL (ref 8.8–10.2)
CHLORIDE SERPL-SCNC: 100 MMOL/L (ref 98–107)
CREAT SERPL-MCNC: 0.78 MG/DL (ref 0.51–0.95)
DEPRECATED HCO3 PLAS-SCNC: 23 MMOL/L (ref 22–29)
GFR SERPL CREATININE-BSD FRML MDRD: 80 ML/MIN/1.73M2
GLUCOSE SERPL-MCNC: 83 MG/DL (ref 70–99)
HBA1C MFR BLD: 6.4 %
PHOSPHATE SERPL-MCNC: 3.5 MG/DL (ref 2.5–4.5)
POTASSIUM SERPL-SCNC: 4.1 MMOL/L (ref 3.4–5.3)
SODIUM SERPL-SCNC: 135 MMOL/L (ref 136–145)

## 2023-06-08 PROCEDURE — 36415 COLL VENOUS BLD VENIPUNCTURE: CPT | Mod: ORL | Performed by: FAMILY MEDICINE

## 2023-06-08 PROCEDURE — 80069 RENAL FUNCTION PANEL: CPT | Mod: ORL | Performed by: FAMILY MEDICINE

## 2023-06-08 PROCEDURE — P9604 ONE-WAY ALLOW PRORATED TRIP: HCPCS | Mod: ORL | Performed by: FAMILY MEDICINE

## 2023-06-08 PROCEDURE — 83036 HEMOGLOBIN GLYCOSYLATED A1C: CPT | Mod: ORL | Performed by: FAMILY MEDICINE

## 2023-06-11 ENCOUNTER — HEALTH MAINTENANCE LETTER (OUTPATIENT)
Age: 73
End: 2023-06-11

## 2023-10-14 ENCOUNTER — HOSPITAL ENCOUNTER (EMERGENCY)
Facility: CLINIC | Age: 73
Discharge: HOME | End: 2023-10-14
Attending: EMERGENCY MEDICINE | Admitting: EMERGENCY MEDICINE
Payer: COMMERCIAL

## 2023-10-14 ENCOUNTER — APPOINTMENT (OUTPATIENT)
Dept: GENERAL RADIOLOGY | Facility: CLINIC | Age: 73
End: 2023-10-14
Attending: EMERGENCY MEDICINE
Payer: COMMERCIAL

## 2023-10-14 VITALS
HEIGHT: 70 IN | HEART RATE: 67 BPM | DIASTOLIC BLOOD PRESSURE: 75 MMHG | TEMPERATURE: 97 F | SYSTOLIC BLOOD PRESSURE: 137 MMHG | OXYGEN SATURATION: 97 % | WEIGHT: 185 LBS | BODY MASS INDEX: 26.48 KG/M2 | RESPIRATION RATE: 18 BRPM

## 2023-10-14 DIAGNOSIS — S32.010A COMPRESSION FRACTURE OF L1 VERTEBRA, INITIAL ENCOUNTER (H): ICD-10-CM

## 2023-10-14 DIAGNOSIS — M54.50 LUMBAR BACK PAIN: ICD-10-CM

## 2023-10-14 PROCEDURE — 99283 EMERGENCY DEPT VISIT LOW MDM: CPT

## 2023-10-14 PROCEDURE — 72100 X-RAY EXAM L-S SPINE 2/3 VWS: CPT

## 2023-10-14 RX ORDER — CYCLOBENZAPRINE HCL 5 MG
5 TABLET ORAL 3 TIMES DAILY PRN
Qty: 15 TABLET | Refills: 0 | Status: SHIPPED | OUTPATIENT
Start: 2023-10-14

## 2023-10-14 RX ORDER — ACETAMINOPHEN 325 MG/1
325-650 TABLET ORAL EVERY 6 HOURS PRN
Qty: 30 TABLET | Refills: 0 | Status: SHIPPED | OUTPATIENT
Start: 2023-10-14

## 2023-10-14 ASSESSMENT — ACTIVITIES OF DAILY LIVING (ADL): ADLS_ACUITY_SCORE: 35

## 2023-10-14 NOTE — ED TRIAGE NOTES
Patient with 1.5 weeks of low back pain, denies injuries/falls. Able to ambulate with cane independently.

## 2023-10-14 NOTE — ED PROVIDER NOTES
History     Chief Complaint:  Back Pain       HPI   Melissa Jean Penrose is a 72 year old female with a history of dementia who presents to the ER complaining of 1-1/2-week history of low back pain.  The patient is a poor historian due to her history of dementia.  The patient states that she does not have fever, dysuria, vaginal symptoms, nausea, vomiting, diarrhea, constipation, recent trauma, or history of similar symptoms.  The patient comes to the hospital with her personal care attendant.  Zoran is her PCA.  Zoran's mom is the patient's POA.        Independent Historian:   The patient and her PCA Zoran    Review of External Notes:   Curtis Erickson MD  Physician Specialty: Endocrinology, Diabetes, and Metabolism  Encounter Date: 4/19/2021       Message reviewed again, though unclear why they are notifying me about Depakote medication.  I will fax my last patient progress note to [facility], for her PCP.     ODALYS Erickson MD, MS  Endocrinology  [facility]            Medications:    acetaminophen (MAPAP) 325 MG tablet  Ascorbic Acid (VITAMIN C) 500 MG CHEW  azelastine (ASTELIN) 0.1 % nasal spray  DEEP SEA NASAL SPRAY 0.65 % nasal spray  divalproex sodium delayed-release (DEPAKOTE) 250 MG DR tablet  fluticasone (FLONASE) 50 MCG/ACT nasal spray  fluticasone-salmeterol (ADVAIR) 250-50 MCG/DOSE inhaler  folic acid (FOLVITE) 1 MG tablet  losartan (COZAAR) 100 MG tablet  meclizine (ANTIVERT) 25 MG tablet  metoprolol tartrate (LOPRESSOR) 25 MG tablet  mirtazapine (REMERON) 15 MG tablet  mometasone (NASONEX) 50 MCG/ACT spray  montelukast (SINGULAIR) 10 MG tablet  omeprazole (PRILOSEC) 20 MG CR capsule  SUMAtriptan (IMITREX) 100 MG tablet  topiramate (TOPAMAX) 25 MG tablet  VITAMIN B-1 100 MG tablet        Past Medical History:    Past Medical History:   Diagnosis Date    Allergic state     Anemia due to blood loss, acute     Cervical spine fracture (H) 2015    Chronic back pain     Dementia (H)      "Fibromyalgia     Hypertension     IFG (impaired fasting glucose)     Osteopenia     Subdural hematoma (H) 2015    Substance abuse (H)     TBI (traumatic brain injury) (H)        Past Surgical History:    Past Surgical History:   Procedure Laterality Date    ORTHOPEDIC SURGERY      PHACOEMULSIFICATION CLEAR CORNEA WITH STANDARD INTRAOCULAR LENS IMPLANT Right 2/7/2017    Procedure: PHACOEMULSIFICATION CLEAR CORNEA WITH STANDARD INTRAOCULAR LENS IMPLANT;  Surgeon: Brianna Christy MD;  Location: [facility]        Physical Exam   Patient Vitals for the past 24 hrs:   BP Temp Temp src Pulse Resp SpO2 Height Weight   10/14/23 1121 137/75 97  F (36.1  C) Temporal 67 18 97 % 1.778 m (5' 10\") 83.9 kg (185 lb)        Physical Exam  General: Alert, No distress. Nontoxic appearance  Head: No signs of trauma.   Mouth/Throat: Oropharynx moist.   Eyes: Conjunctivae are normal. Pupils are equal..   Neck: Normal range of motion.    CV: Appears well perfused.  Resp:No respiratory distress.   Back: Patient has tenderness in the low lumbar spine.  No step-offs.  No deformities.  She has pain with movement in the lumbar spine  MSK: Normal range of motion. No obvious deformity.   Neuro: The patient is alert and interactive. SANTOYO. Speech normal. GCS 15  Skin: No lesion or sign of trauma noted.   Psych: normal mood and affect. behavior is normal.       Emergency Department Course     Imaging:  Lumbar spine XR, 2-3 views   Final Result   IMPRESSION: Five lumbar vertebral bodies. 5 mm anterolisthesis of L4 on L5 unchanged. 3 mm retrolisthesis of L1 on L2 appears progressed. Age-indeterminate inferior endplate compression fracture deformity of L1 appears new versus the prior CT. MRI could    evaluate for edema. Multilevel mild to moderate interspace, endplate and facet degeneration.            Emergency Department Course & Assessments:    Interventions:  Medications - No data to display     Assessments:  Patient was seen and evaluated in the ED " upon arrival and several times thereafter    Independent Interpretation (X-rays, CTs, rhythm strip):  Lumbar x-ray shows evidence of a compression fracture    Consultations/Discussion of Management or Tests:  None    Social Determinants of Health affecting care:   Healthcare Access/Compliance, Transportation, and Stress/Adjustment Disorders    Disposition:  The patient was discharged to home.  The patient refused admission to the hospital for pain control and PT/OT evaluation    Impression & Plan      Medical Decision Making:  Melissa Jean Penrose is a 72 year old female who presents for evaluation of back pain after bending forward.  No red flag symptoms for back pain to warrant further workup and I doubt epidural abscess, hematoma, cauda equina, significant spinal stenosis, fragment of bone near spinal cord, etc.  CMS is intact distally in both lower extremities.  Xrays reveal an L1 acute compression fracture that fits patients injury and pain area.   There is no indication for ortho or neurosurgical consultation from the ED. the patient is refusing admission to the hospital for pain control and PT OT evaluation.  She is requesting discharge home.  Fracture precautions for home.  The patients head to toe trauma exam is otherwise normal at this time and no further trauma workup is needed as I believe there is no signs of serious head, neck, chest, spinal, extremity or abdominal injuries.     This is a non-operative injury and unless further compression with time or unrelenting pain, definitive fracture care is provided.  Patient was educated on natural course of a vertebral compression fracture, provided pain medication, educated on natural course of this fracture, need for increasing gentle activity (walking around block, increase distance each day), complications of fractures (pneumonia, DVT, etc). .  They need to follow up with primary only and only if further symptoms or progressive symptoms will need to seek  care again in ED or with specialist.      Diagnosis:    ICD-10-CM    1. Lumbar back pain  M54.50       2. Compression fracture of L1 vertebra, initial encounter (H)  S32.010A            Discharge Medications:  Discharge Medication List as of 10/14/2023  2:27 PM        START taking these medications    Details   cyclobenzaprine (FLEXERIL) 5 MG tablet Take 1 tablet (5 mg) by mouth 3 times daily as needed for muscle spasms, Disp-15 tablet, R-0, E-Prescribe            10/14/2023   Trey Dozier MD Joing, Todd Roger, MD  10/14/23 1532

## 2024-01-31 ENCOUNTER — LAB REQUISITION (OUTPATIENT)
Dept: LAB | Facility: CLINIC | Age: 74
End: 2024-01-31
Payer: COMMERCIAL

## 2024-01-31 DIAGNOSIS — M48.56XD COLLAPSED VERTEBRA, NOT ELSEWHERE CLASSIFIED, LUMBAR REGION, SUBSEQUENT ENCOUNTER FOR FRACTURE WITH ROUTINE HEALING: ICD-10-CM

## 2024-01-31 DIAGNOSIS — I10 ESSENTIAL (PRIMARY) HYPERTENSION: ICD-10-CM

## 2024-01-31 DIAGNOSIS — E03.9 HYPOTHYROIDISM, UNSPECIFIED: ICD-10-CM

## 2024-01-31 DIAGNOSIS — J42 UNSPECIFIED CHRONIC BRONCHITIS (H): ICD-10-CM

## 2024-01-31 DIAGNOSIS — R73.03 PREDIABETES: ICD-10-CM

## 2024-02-01 LAB
ERYTHROCYTE [DISTWIDTH] IN BLOOD BY AUTOMATED COUNT: 14.3 % (ref 10–15)
HBA1C MFR BLD: 6 %
HCT VFR BLD AUTO: 40.4 % (ref 35–47)
HGB BLD-MCNC: 13.2 G/DL (ref 11.7–15.7)
MCH RBC QN AUTO: 28.7 PG (ref 26.5–33)
MCHC RBC AUTO-ENTMCNC: 32.7 G/DL (ref 31.5–36.5)
MCV RBC AUTO: 88 FL (ref 78–100)
PLATELET # BLD AUTO: 381 10E3/UL (ref 150–450)
RBC # BLD AUTO: 4.6 10E6/UL (ref 3.8–5.2)
WBC # BLD AUTO: 10.7 10E3/UL (ref 4–11)

## 2024-02-01 PROCEDURE — P9603 ONE-WAY ALLOW PRORATED MILES: HCPCS | Mod: ORL | Performed by: FAMILY MEDICINE

## 2024-02-01 PROCEDURE — 80053 COMPREHEN METABOLIC PANEL: CPT | Mod: ORL | Performed by: FAMILY MEDICINE

## 2024-02-01 PROCEDURE — 82306 VITAMIN D 25 HYDROXY: CPT | Mod: ORL | Performed by: FAMILY MEDICINE

## 2024-02-01 PROCEDURE — 84443 ASSAY THYROID STIM HORMONE: CPT | Mod: ORL | Performed by: FAMILY MEDICINE

## 2024-02-01 PROCEDURE — 85027 COMPLETE CBC AUTOMATED: CPT | Mod: ORL | Performed by: FAMILY MEDICINE

## 2024-02-01 PROCEDURE — 83036 HEMOGLOBIN GLYCOSYLATED A1C: CPT | Mod: ORL | Performed by: FAMILY MEDICINE

## 2024-02-01 PROCEDURE — 36415 COLL VENOUS BLD VENIPUNCTURE: CPT | Mod: ORL | Performed by: FAMILY MEDICINE

## 2024-02-02 LAB
ALBUMIN SERPL BCG-MCNC: 3.8 G/DL (ref 3.5–5.2)
ALP SERPL-CCNC: 119 U/L (ref 40–150)
ALT SERPL W P-5'-P-CCNC: 16 U/L (ref 0–50)
ANION GAP SERPL CALCULATED.3IONS-SCNC: 8 MMOL/L (ref 7–15)
AST SERPL W P-5'-P-CCNC: 22 U/L (ref 0–45)
BILIRUB SERPL-MCNC: 0.3 MG/DL
BUN SERPL-MCNC: 12.9 MG/DL (ref 8–23)
CALCIUM SERPL-MCNC: 9.2 MG/DL (ref 8.8–10.2)
CHLORIDE SERPL-SCNC: 102 MMOL/L (ref 98–107)
CREAT SERPL-MCNC: 0.65 MG/DL (ref 0.51–0.95)
DEPRECATED HCO3 PLAS-SCNC: 23 MMOL/L (ref 22–29)
EGFRCR SERPLBLD CKD-EPI 2021: >90 ML/MIN/1.73M2
GLUCOSE SERPL-MCNC: 92 MG/DL (ref 70–99)
POTASSIUM SERPL-SCNC: 4.6 MMOL/L (ref 3.4–5.3)
PROT SERPL-MCNC: 6.9 G/DL (ref 6.4–8.3)
SODIUM SERPL-SCNC: 133 MMOL/L (ref 135–145)
TSH SERPL DL<=0.005 MIU/L-ACNC: 2.81 UIU/ML (ref 0.3–4.2)
VIT D+METAB SERPL-MCNC: 18 NG/ML (ref 20–50)

## 2024-02-06 ENCOUNTER — HOSPITAL ENCOUNTER (OUTPATIENT)
Dept: MAMMOGRAPHY | Facility: CLINIC | Age: 74
Discharge: HOME | End: 2024-02-06
Attending: FAMILY MEDICINE | Admitting: FAMILY MEDICINE
Payer: COMMERCIAL

## 2024-02-06 DIAGNOSIS — Z12.31 VISIT FOR SCREENING MAMMOGRAM: ICD-10-CM

## 2024-02-06 PROCEDURE — 77067 SCR MAMMO BI INCL CAD: CPT

## 2024-02-21 ENCOUNTER — HOSPITAL ENCOUNTER (OUTPATIENT)
Dept: MAMMOGRAPHY | Facility: CLINIC | Age: 74
Discharge: HOME | End: 2024-02-21
Attending: FAMILY MEDICINE
Payer: COMMERCIAL

## 2024-02-21 DIAGNOSIS — R92.8 ABNORMAL MAMMOGRAM: ICD-10-CM

## 2024-02-21 PROCEDURE — 76642 ULTRASOUND BREAST LIMITED: CPT | Mod: RT

## 2024-02-21 PROCEDURE — 77065 DX MAMMO INCL CAD UNI: CPT | Mod: RT

## 2024-02-28 ENCOUNTER — HOSPITAL ENCOUNTER (OUTPATIENT)
Dept: MAMMOGRAPHY | Facility: CLINIC | Age: 74
Discharge: HOME | End: 2024-02-28
Attending: FAMILY MEDICINE
Payer: COMMERCIAL

## 2024-02-28 DIAGNOSIS — R92.8 ABNORMAL MAMMOGRAM: ICD-10-CM

## 2024-02-28 PROCEDURE — 19081 BX BREAST 1ST LESION STRTCTC: CPT | Mod: RT

## 2024-02-28 PROCEDURE — 250N000009 HC RX 250: Performed by: RADIOLOGY

## 2024-02-28 PROCEDURE — 999N000065 MA POST PROCEDURE RIGHT

## 2024-02-28 PROCEDURE — 88305 TISSUE EXAM BY PATHOLOGIST: CPT | Mod: TC

## 2024-02-28 RX ADMIN — LIDOCAINE HYDROCHLORIDE 5 ML: 10 INJECTION, SOLUTION INFILTRATION; PERINEURAL at 13:17

## 2024-02-28 RX ADMIN — LIDOCAINE HYDROCHLORIDE 10 ML: 10; .005 INJECTION, SOLUTION EPIDURAL; INFILTRATION; INTRACAUDAL; PERINEURAL at 13:17

## 2024-02-28 NOTE — DISCHARGE INSTRUCTIONS
After Your Breast Biopsy  Bleeding, bruising, and pain  Breast tenderness and some bruising is normal and may last several days. You may wear your bra overnight to support the breast.  You may use an ice pack for pain. Place it over the area for 15 to 20 minutes, several times a day.  You may take over-the-counter pain medicine:  On the day of the biopsy, we recommend Tylenol (acetaminophen) because it does not raise your risk of bleeding.  The next day, you may take an anti-inflammatory medicine (aspirin, ibuprofen, Motrin, Aleve, Advil), unless your doctor tells you not to.  Bandages and showering  Keep your bandage in place until tomorrow morning. Don't get it wet.  If you have small pieces of tape on the skin, leave them in place. They will fall off on their own, or you can remove them after 5 days.  You may shower the next morning after your biopsy.  Activity  No heavy activity (no running, no gym workouts, no lifting, no vacuuming, etc.) on the day of your biopsy.  You may go back to normal activity the next day. But limit what you do if you still have pain or discomfort.  Infection  Infection is rare. Signs of infection include:  Fever (including sweats and chills)  Redness  Pain that gets worse  Fluid draining from the biopsy site  Biopsy results  Results may take up to 5 business days.  A nurse or doctor from the Breast Center will call with your results. We will also send the results to the doctor that ordered your biopsy.  If you have not gotten your results in 5 days, please call the Breast Center.  Call the Breast Center with questions or if:   You have bleeding that lasts more than 20 minutes.  You have pain that you can't control.  You have signs of infection (fever, sweats, chills, redness, increasing pain, or drainage).  After hours, please call the doctor who ordered your biopsy.  For informational purposes only. Not to replace the advice of your health care provider. Copyright   2010 [facility]. All rights reserved. Clinically reviewed by Irma Rodriguez, Director, [facility]. [employer]isse [id number] - REV 08/23.

## 2024-02-29 LAB
PATH REPORT.COMMENTS IMP SPEC: NORMAL
PATH REPORT.FINAL DX SPEC: NORMAL
PATH REPORT.GROSS SPEC: NORMAL
PATH REPORT.MICROSCOPIC SPEC OTHER STN: NORMAL
PATH REPORT.RELEVANT HX SPEC: NORMAL
PHOTO IMAGE: NORMAL

## 2024-02-29 PROCEDURE — 88305 TISSUE EXAM BY PATHOLOGIST: CPT | Mod: 26 | Performed by: PATHOLOGY

## 2024-03-01 ENCOUNTER — TELEPHONE (OUTPATIENT)
Dept: MAMMOGRAPHY | Facility: CLINIC | Age: 74
End: 2024-03-01
Payer: COMMERCIAL

## 2024-03-01 NOTE — TELEPHONE ENCOUNTER
After review by Breast Center Radiologist, Dr. Stephen Richter, Suzanne was called,  verified, and given her 2024 RIGHT Breast Biopsy results (Calcified Fibroadenoma) and recommended Follow up (Annual Screening Mammograms).   Patient denies any concerns with the biopsy site. Ordering provider was informed of the results and follow up plan.  I encouraged her to contact her doctor with any further breast concerns.  Patient verbalized understanding and agrees with the plan of care.        Brittany Uribe RN BSN  Procedure Nurse  [facility] [place]  [phone]    -----------------------------------------------------------------------------------------------------------------------------------------    [facility]  Penrose, Melissa Jean [id number]  F, 1950  Surgical Pathology Report (Final result) [id number]  Authorizing Provider: Non-Fv Credentialed Provider,  Radiology  Ordering Provider: Non-Fv Credentialed Provider,  Radiology  Ordering Location: [facility]  Collected: 2024 01:25 PM  Pathologist: Gallito Montague MD Received: 2024 02:14 PM  .  Specimens  A Breast, Right  .  .  Final Diagnosis  A. 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

## 2024-08-04 ENCOUNTER — HEALTH MAINTENANCE LETTER (OUTPATIENT)
Age: 74
End: 2024-08-04

## 2024-09-17 ENCOUNTER — LAB REQUISITION (OUTPATIENT)
Dept: LAB | Facility: CLINIC | Age: 74
End: 2024-09-17
Payer: COMMERCIAL

## 2024-09-17 DIAGNOSIS — E78.5 HYPERLIPIDEMIA, UNSPECIFIED: ICD-10-CM

## 2024-09-17 DIAGNOSIS — I10 ESSENTIAL (PRIMARY) HYPERTENSION: ICD-10-CM

## 2024-09-17 DIAGNOSIS — D64.9 ANEMIA, UNSPECIFIED: ICD-10-CM

## 2024-09-19 LAB
EST. AVERAGE GLUCOSE BLD GHB EST-MCNC: 128 MG/DL
HBA1C MFR BLD: 6.1 %

## 2024-09-19 PROCEDURE — 80053 COMPREHEN METABOLIC PANEL: CPT | Mod: ORL

## 2024-09-19 PROCEDURE — 36415 COLL VENOUS BLD VENIPUNCTURE: CPT | Mod: ORL

## 2024-09-19 PROCEDURE — 80061 LIPID PANEL: CPT | Mod: ORL

## 2024-09-19 PROCEDURE — 83036 HEMOGLOBIN GLYCOSYLATED A1C: CPT | Mod: ORL

## 2024-09-19 PROCEDURE — P9603 ONE-WAY ALLOW PRORATED MILES: HCPCS | Mod: ORL

## 2024-09-20 LAB
ALBUMIN SERPL BCG-MCNC: 3.7 G/DL (ref 3.5–5.2)
ALP SERPL-CCNC: 126 U/L (ref 40–150)
ALT SERPL W P-5'-P-CCNC: 19 U/L (ref 0–50)
ANION GAP SERPL CALCULATED.3IONS-SCNC: 10 MMOL/L (ref 7–15)
AST SERPL W P-5'-P-CCNC: 23 U/L (ref 0–45)
BILIRUB SERPL-MCNC: 0.3 MG/DL
BUN SERPL-MCNC: 16.5 MG/DL (ref 8–23)
CALCIUM SERPL-MCNC: 9.1 MG/DL (ref 8.8–10.4)
CHLORIDE SERPL-SCNC: 102 MMOL/L (ref 98–107)
CHOLEST SERPL-MCNC: 141 MG/DL
CREAT SERPL-MCNC: 0.79 MG/DL (ref 0.51–0.95)
EGFRCR SERPLBLD CKD-EPI 2021: 79 ML/MIN/1.73M2
FASTING STATUS PATIENT QL REPORTED: NORMAL
GLUCOSE SERPL-MCNC: 87 MG/DL (ref 70–99)
HCO3 SERPL-SCNC: 23 MMOL/L (ref 22–29)
HDLC SERPL-MCNC: 54 MG/DL
LDLC SERPL CALC-MCNC: 76 MG/DL
NONHDLC SERPL-MCNC: 87 MG/DL
POTASSIUM SERPL-SCNC: 4.3 MMOL/L (ref 3.4–5.3)
PROT SERPL-MCNC: 7.2 G/DL (ref 6.4–8.3)
SODIUM SERPL-SCNC: 135 MMOL/L (ref 135–145)
TRIGL SERPL-MCNC: 56 MG/DL

## 2025-03-03 ENCOUNTER — LAB REQUISITION (OUTPATIENT)
Dept: LAB | Facility: CLINIC | Age: 75
End: 2025-03-03
Payer: COMMERCIAL

## 2025-03-03 DIAGNOSIS — J42 UNSPECIFIED CHRONIC BRONCHITIS (H): ICD-10-CM

## 2025-03-03 DIAGNOSIS — E87.6 HYPOKALEMIA: ICD-10-CM

## 2025-03-03 DIAGNOSIS — S32.010D WEDGE COMPRESSION FRACTURE OF FIRST LUMBAR VERTEBRA, SUBSEQUENT ENCOUNTER FOR FRACTURE WITH ROUTINE HEALING: ICD-10-CM

## 2025-03-03 DIAGNOSIS — S06.9X9S UNSPECIFIED INTRACRANIAL INJURY WITH LOSS OF CONSCIOUSNESS OF UNSPECIFIED DURATION, SEQUELA: ICD-10-CM

## 2025-03-06 LAB
ALBUMIN SERPL BCG-MCNC: 3.8 G/DL (ref 3.5–5.2)
ALP SERPL-CCNC: 107 U/L (ref 40–150)
ALT SERPL W P-5'-P-CCNC: 11 U/L (ref 0–50)
ANION GAP SERPL CALCULATED.3IONS-SCNC: 11 MMOL/L (ref 7–15)
AST SERPL W P-5'-P-CCNC: 23 U/L (ref 0–45)
BILIRUB SERPL-MCNC: 0.3 MG/DL
BUN SERPL-MCNC: 17.6 MG/DL (ref 8–23)
CALCIUM SERPL-MCNC: 9.9 MG/DL (ref 8.8–10.4)
CHLORIDE SERPL-SCNC: 103 MMOL/L (ref 98–107)
CREAT SERPL-MCNC: 0.78 MG/DL (ref 0.51–0.95)
EGFRCR SERPLBLD CKD-EPI 2021: 79 ML/MIN/1.73M2
ERYTHROCYTE [DISTWIDTH] IN BLOOD BY AUTOMATED COUNT: 14.4 % (ref 10–15)
HCO3 SERPL-SCNC: 22 MMOL/L (ref 22–29)
HCT VFR BLD AUTO: 40.7 % (ref 35–47)
HGB BLD-MCNC: 13.2 G/DL (ref 11.7–15.7)
MCH RBC QN AUTO: 28.9 PG (ref 26.5–33)
MCHC RBC AUTO-ENTMCNC: 32.4 G/DL (ref 31.5–36.5)
MCV RBC AUTO: 89 FL (ref 78–100)
PLATELET # BLD AUTO: 351 10E3/UL (ref 150–450)
POTASSIUM SERPL-SCNC: 4.7 MMOL/L (ref 3.4–5.3)
PROT SERPL-MCNC: 7.4 G/DL (ref 6.4–8.3)
RBC # BLD AUTO: 4.56 10E6/UL (ref 3.8–5.2)
SODIUM SERPL-SCNC: 136 MMOL/L (ref 135–145)
TSH SERPL DL<=0.005 MIU/L-ACNC: 3.16 UIU/ML (ref 0.3–4.2)
VIT D+METAB SERPL-MCNC: 40 NG/ML (ref 20–50)
WBC # BLD AUTO: 11.4 10E3/UL (ref 4–11)

## 2025-03-06 PROCEDURE — 85027 COMPLETE CBC AUTOMATED: CPT | Mod: ORL

## 2025-03-06 PROCEDURE — 36415 COLL VENOUS BLD VENIPUNCTURE: CPT | Mod: ORL

## 2025-03-06 PROCEDURE — 84443 ASSAY THYROID STIM HORMONE: CPT | Mod: ORL

## 2025-03-06 PROCEDURE — 80053 COMPREHEN METABOLIC PANEL: CPT | Mod: ORL

## 2025-03-06 PROCEDURE — P9604 ONE-WAY ALLOW PRORATED TRIP: HCPCS | Mod: ORL

## 2025-03-06 PROCEDURE — 82306 VITAMIN D 25 HYDROXY: CPT | Mod: ORL

## 2025-03-07 LAB — GLUCOSE SERPL-MCNC: 90 MG/DL (ref 70–99)

## 2025-06-07 ENCOUNTER — APPOINTMENT (OUTPATIENT)
Dept: CT IMAGING | Facility: CLINIC | Age: 75
End: 2025-06-07
Attending: PHYSICIAN ASSISTANT
Payer: COMMERCIAL

## 2025-06-07 ENCOUNTER — HOSPITAL ENCOUNTER (INPATIENT)
Facility: CLINIC | Age: 75
LOS: 3 days | Discharge: NURSING, REHAB OR LONG-TERM CARE | End: 2025-06-11
Attending: PHYSICIAN ASSISTANT | Admitting: INTERNAL MEDICINE
Payer: COMMERCIAL

## 2025-06-07 ENCOUNTER — APPOINTMENT (OUTPATIENT)
Dept: GENERAL RADIOLOGY | Facility: CLINIC | Age: 75
End: 2025-06-07
Attending: PHYSICIAN ASSISTANT
Payer: COMMERCIAL

## 2025-06-07 DIAGNOSIS — S32.040A CLOSED COMPRESSION FRACTURE OF L4 LUMBAR VERTEBRA, INITIAL ENCOUNTER (H): ICD-10-CM
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
## 2025-06-07 DIAGNOSIS — S32.010A COMPRESSION FRACTURE OF L1 VERTEBRA, INITIAL ENCOUNTER (H): ICD-10-CM

## 2025-06-07 DIAGNOSIS — S09.90XA CLOSED HEAD INJURY, INITIAL ENCOUNTER: ICD-10-CM

## 2025-06-07 DIAGNOSIS — R53.1 GENERALIZED WEAKNESS: ICD-10-CM

## 2025-06-07 DIAGNOSIS — T14.8XXA HEMATOMA: ICD-10-CM

## 2025-06-07 DIAGNOSIS — S32.050A CLOSED COMPRESSION FRACTURE OF L5 LUMBAR VERTEBRA, INITIAL ENCOUNTER (H): ICD-10-CM

## 2025-06-07 LAB
ALBUMIN UR-MCNC: NEGATIVE MG/DL
ANION GAP SERPL CALCULATED.3IONS-SCNC: 11 MMOL/L (ref 7–15)
APPEARANCE UR: CLEAR
ATRIAL RATE - MUSE: 80 BPM
BACTERIA #/AREA URNS HPF: ABNORMAL /HPF
BASOPHILS # BLD AUTO: 0.1 10E3/UL (ref 0–0.2)
BASOPHILS NFR BLD AUTO: 0 %
BILIRUB UR QL STRIP: NEGATIVE
BUN SERPL-MCNC: 14.7 MG/DL (ref 8–23)
CALCIUM SERPL-MCNC: 9.1 MG/DL (ref 8.8–10.4)
CHLORIDE SERPL-SCNC: 99 MMOL/L (ref 98–107)
COLOR UR AUTO: YELLOW
CREAT SERPL-MCNC: 0.79 MG/DL (ref 0.51–0.95)
DIASTOLIC BLOOD PRESSURE - MUSE: NORMAL MMHG
EGFRCR SERPLBLD CKD-EPI 2021: 78 ML/MIN/1.73M2
EOSINOPHIL # BLD AUTO: 0.2 10E3/UL (ref 0–0.7)
EOSINOPHIL NFR BLD AUTO: 1 %
ERYTHROCYTE [DISTWIDTH] IN BLOOD BY AUTOMATED COUNT: 12.9 % (ref 10–15)
GLUCOSE SERPL-MCNC: 112 MG/DL (ref 70–99)
GLUCOSE UR STRIP-MCNC: NEGATIVE MG/DL
HCO3 SERPL-SCNC: 22 MMOL/L (ref 22–29)
HCT VFR BLD AUTO: 38.1 % (ref 35–47)
HGB BLD-MCNC: 12.5 G/DL (ref 11.7–15.7)
HGB UR QL STRIP: NEGATIVE
HOLD SPECIMEN: NORMAL
IMM GRANULOCYTES # BLD: 0.2 10E3/UL
IMM GRANULOCYTES NFR BLD: 1 %
INTERPRETATION ECG - MUSE: NORMAL
KETONES UR STRIP-MCNC: NEGATIVE MG/DL
LEUKOCYTE ESTERASE UR QL STRIP: NEGATIVE
LYMPHOCYTES # BLD AUTO: 1.8 10E3/UL (ref 0.8–5.3)
LYMPHOCYTES NFR BLD AUTO: 14 %
MCH RBC QN AUTO: 28.6 PG (ref 26.5–33)
MCHC RBC AUTO-ENTMCNC: 32.8 G/DL (ref 31.5–36.5)
MCV RBC AUTO: 87 FL (ref 78–100)
MONOCYTES # BLD AUTO: 1.4 10E3/UL (ref 0–1.3)
MONOCYTES NFR BLD AUTO: 11 %
NEUTROPHILS # BLD AUTO: 9.5 10E3/UL (ref 1.6–8.3)
NEUTROPHILS NFR BLD AUTO: 73 %
NITRATE UR QL: NEGATIVE
NRBC # BLD AUTO: 0 10E3/UL
NRBC BLD AUTO-RTO: 0 /100
P AXIS - MUSE: 50 DEGREES
PH UR STRIP: 7 [PH] (ref 5–7)
PLATELET # BLD AUTO: 390 10E3/UL (ref 150–450)
POTASSIUM SERPL-SCNC: 4 MMOL/L (ref 3.4–5.3)
PR INTERVAL - MUSE: 210 MS
QRS DURATION - MUSE: 98 MS
QT - MUSE: 372 MS
QTC - MUSE: 429 MS
R AXIS - MUSE: -25 DEGREES
RBC # BLD AUTO: 4.37 10E6/UL (ref 3.8–5.2)
RBC URINE: <1 /HPF
SODIUM SERPL-SCNC: 132 MMOL/L (ref 135–145)
SP GR UR STRIP: 1.02 (ref 1–1.03)
SQUAMOUS EPITHELIAL: <1 /HPF
SYSTOLIC BLOOD PRESSURE - MUSE: NORMAL MMHG
T AXIS - MUSE: 33 DEGREES
TROPONIN T SERPL HS-MCNC: 29 NG/L
TROPONIN T SERPL HS-MCNC: 29 NG/L
UROBILINOGEN UR STRIP-MCNC: NORMAL MG/DL
VENTRICULAR RATE- MUSE: 80 BPM
WBC # BLD AUTO: 13 10E3/UL (ref 4–11)
WBC URINE: 1 /HPF

## 2025-06-07 PROCEDURE — 250N000013 HC RX MED GY IP 250 OP 250 PS 637

## 2025-06-07 PROCEDURE — 72131 CT LUMBAR SPINE W/O DYE: CPT

## 2025-06-07 PROCEDURE — 72125 CT NECK SPINE W/O DYE: CPT

## 2025-06-07 PROCEDURE — G0378 HOSPITAL OBSERVATION PER HR: HCPCS

## 2025-06-07 PROCEDURE — 250N000011 HC RX IP 250 OP 636: Mod: JZ

## 2025-06-07 PROCEDURE — 81003 URINALYSIS AUTO W/O SCOPE: CPT | Performed by: PHYSICIAN ASSISTANT

## 2025-06-07 PROCEDURE — 36415 COLL VENOUS BLD VENIPUNCTURE: CPT | Performed by: PHYSICIAN ASSISTANT

## 2025-06-07 PROCEDURE — 99285 EMERGENCY DEPT VISIT HI MDM: CPT | Mod: 25

## 2025-06-07 PROCEDURE — 71046 X-RAY EXAM CHEST 2 VIEWS: CPT

## 2025-06-07 PROCEDURE — 80048 BASIC METABOLIC PNL TOTAL CA: CPT | Performed by: PHYSICIAN ASSISTANT

## 2025-06-07 PROCEDURE — 96374 THER/PROPH/DIAG INJ IV PUSH: CPT

## 2025-06-07 PROCEDURE — 99223 1ST HOSP IP/OBS HIGH 75: CPT

## 2025-06-07 PROCEDURE — 70450 CT HEAD/BRAIN W/O DYE: CPT

## 2025-06-07 PROCEDURE — 85025 COMPLETE CBC W/AUTO DIFF WBC: CPT | Performed by: PHYSICIAN ASSISTANT

## 2025-06-07 PROCEDURE — 84484 ASSAY OF TROPONIN QUANT: CPT | Performed by: PHYSICIAN ASSISTANT

## 2025-06-07 PROCEDURE — 93005 ELECTROCARDIOGRAM TRACING: CPT

## 2025-06-07 RX ORDER — ACETAMINOPHEN 325 MG/1
650 TABLET ORAL EVERY 6 HOURS PRN
Status: ON HOLD | COMMUNITY
End: 2025-06-11

## 2025-06-07 RX ORDER — AMMONIUM LACTATE 12 G/100G
CREAM TOPICAL 2 TIMES DAILY
COMMUNITY

## 2025-06-07 RX ORDER — HYDRALAZINE HYDROCHLORIDE 20 MG/ML
10 INJECTION INTRAMUSCULAR; INTRAVENOUS EVERY 4 HOURS PRN
Status: DISCONTINUED | OUTPATIENT
Start: 2025-06-07 | End: 2025-06-11 | Stop reason: HOSPADM

## 2025-06-07 RX ORDER — LEVOTHYROXINE SODIUM 100 UG/1
100 TABLET ORAL
Status: DISCONTINUED | OUTPATIENT
Start: 2025-06-08 | End: 2025-06-11 | Stop reason: HOSPADM

## 2025-06-07 RX ORDER — ACETAMINOPHEN 325 MG/1
975 TABLET ORAL 3 TIMES DAILY
Status: DISCONTINUED | OUTPATIENT
Start: 2025-06-07 | End: 2025-06-11 | Stop reason: HOSPADM

## 2025-06-07 RX ORDER — OMEPRAZOLE 20 MG/1
20 CAPSULE, DELAYED RELEASE ORAL DAILY
Status: DISCONTINUED | OUTPATIENT
Start: 2025-06-08 | End: 2025-06-07

## 2025-06-07 RX ORDER — PRENATAL VIT 91/IRON/FOLIC/DHA 28-975-200
COMBINATION PACKAGE (EA) ORAL 2 TIMES DAILY
COMMUNITY

## 2025-06-07 RX ORDER — NALOXONE HYDROCHLORIDE 0.4 MG/ML
0.4 INJECTION, SOLUTION INTRAMUSCULAR; INTRAVENOUS; SUBCUTANEOUS
Status: DISCONTINUED | OUTPATIENT
Start: 2025-06-07 | End: 2025-06-11 | Stop reason: HOSPADM

## 2025-06-07 RX ORDER — METHOCARBAMOL 500 MG/1
500 TABLET, FILM COATED ORAL 4 TIMES DAILY PRN
Status: DISCONTINUED | OUTPATIENT
Start: 2025-06-07 | End: 2025-06-11 | Stop reason: HOSPADM

## 2025-06-07 RX ORDER — AMOXICILLIN 250 MG
2 CAPSULE ORAL 2 TIMES DAILY PRN
Status: DISCONTINUED | OUTPATIENT
Start: 2025-06-07 | End: 2025-06-11 | Stop reason: HOSPADM

## 2025-06-07 RX ORDER — POTASSIUM CHLORIDE 750 MG/1
20 TABLET, EXTENDED RELEASE ORAL DAILY
COMMUNITY

## 2025-06-07 RX ORDER — GUAIFENESIN AND DEXTROMETHORPHAN HYDROBROMIDE 10; 100 MG/5ML; MG/5ML
10 SYRUP ORAL EVERY 4 HOURS PRN
COMMUNITY

## 2025-06-07 RX ORDER — SODIUM FLUORIDE 5 MG/ML
PASTE, DENTIFRICE DENTAL DAILY PRN
COMMUNITY

## 2025-06-07 RX ORDER — POTASSIUM CHLORIDE 750 MG/1
20 TABLET, EXTENDED RELEASE ORAL DAILY
Status: DISCONTINUED | OUTPATIENT
Start: 2025-06-08 | End: 2025-06-11 | Stop reason: HOSPADM

## 2025-06-07 RX ORDER — HYDROMORPHONE HCL IN WATER/PF 6 MG/30 ML
0.2 PATIENT CONTROLLED ANALGESIA SYRINGE INTRAVENOUS ONCE
Status: COMPLETED | OUTPATIENT
Start: 2025-06-07 | End: 2025-06-07

## 2025-06-07 RX ORDER — TRIAMCINOLONE ACETONIDE 1 MG/G
CREAM TOPICAL 4 TIMES DAILY PRN
COMMUNITY

## 2025-06-07 RX ORDER — NALOXONE HYDROCHLORIDE 0.4 MG/ML
0.2 INJECTION, SOLUTION INTRAMUSCULAR; INTRAVENOUS; SUBCUTANEOUS
Status: DISCONTINUED | OUTPATIENT
Start: 2025-06-07 | End: 2025-06-11 | Stop reason: HOSPADM

## 2025-06-07 RX ORDER — OXYCODONE HYDROCHLORIDE 5 MG/1
5 TABLET ORAL EVERY 4 HOURS PRN
Status: DISCONTINUED | OUTPATIENT
Start: 2025-06-07 | End: 2025-06-11 | Stop reason: HOSPADM

## 2025-06-07 RX ORDER — GUAIFENESIN 600 MG/1
600 TABLET, EXTENDED RELEASE ORAL 2 TIMES DAILY
COMMUNITY

## 2025-06-07 RX ORDER — FLURBIPROFEN 100 MG/1
100 TABLET, FILM COATED ORAL EVERY 8 HOURS PRN
COMMUNITY

## 2025-06-07 RX ORDER — ONDANSETRON 2 MG/ML
4 INJECTION INTRAMUSCULAR; INTRAVENOUS EVERY 6 HOURS PRN
Status: DISCONTINUED | OUTPATIENT
Start: 2025-06-07 | End: 2025-06-11 | Stop reason: HOSPADM

## 2025-06-07 RX ORDER — TOPIRAMATE 50 MG/1
75 TABLET, FILM COATED ORAL 2 TIMES DAILY
COMMUNITY

## 2025-06-07 RX ORDER — HYDRALAZINE HYDROCHLORIDE 10 MG/1
10 TABLET, FILM COATED ORAL EVERY 4 HOURS PRN
Status: DISCONTINUED | OUTPATIENT
Start: 2025-06-07 | End: 2025-06-11 | Stop reason: HOSPADM

## 2025-06-07 RX ORDER — RIZATRIPTAN BENZOATE 10 MG/1
10 TABLET ORAL
COMMUNITY

## 2025-06-07 RX ORDER — METOCLOPRAMIDE 10 MG/1
10-20 TABLET ORAL SEE ADMIN INSTRUCTIONS
COMMUNITY

## 2025-06-07 RX ORDER — MONTELUKAST SODIUM 10 MG/1
10 TABLET ORAL AT BEDTIME
Status: DISCONTINUED | OUTPATIENT
Start: 2025-06-07 | End: 2025-06-11 | Stop reason: HOSPADM

## 2025-06-07 RX ORDER — ONDANSETRON 4 MG/1
4 TABLET, ORALLY DISINTEGRATING ORAL EVERY 6 HOURS PRN
Status: DISCONTINUED | OUTPATIENT
Start: 2025-06-07 | End: 2025-06-11 | Stop reason: HOSPADM

## 2025-06-07 RX ORDER — METOPROLOL TARTRATE 25 MG/1
37.5 TABLET, FILM COATED ORAL 2 TIMES DAILY
COMMUNITY

## 2025-06-07 RX ORDER — ERGOCALCIFEROL 1.25 MG/1
50000 CAPSULE, LIQUID FILLED ORAL WEEKLY
COMMUNITY

## 2025-06-07 RX ORDER — AMOXICILLIN 250 MG
1 CAPSULE ORAL 2 TIMES DAILY PRN
Status: DISCONTINUED | OUTPATIENT
Start: 2025-06-07 | End: 2025-06-11 | Stop reason: HOSPADM

## 2025-06-07 RX ORDER — PANTOPRAZOLE SODIUM 40 MG/1
40 TABLET, DELAYED RELEASE ORAL
Status: DISCONTINUED | OUTPATIENT
Start: 2025-06-08 | End: 2025-06-11 | Stop reason: HOSPADM

## 2025-06-07 RX ORDER — AZELASTINE 1 MG/ML
1 SPRAY, METERED NASAL DAILY
COMMUNITY

## 2025-06-07 RX ORDER — TRAMADOL HYDROCHLORIDE 50 MG/1
12.5 TABLET ORAL 2 TIMES DAILY
COMMUNITY

## 2025-06-07 RX ORDER — LEVOTHYROXINE SODIUM 100 UG/1
100 TABLET ORAL
COMMUNITY

## 2025-06-07 RX ADMIN — MONTELUKAST 10 MG: 10 TABLET, FILM COATED ORAL at 21:48

## 2025-06-07 RX ADMIN — METOPROLOL TARTRATE 37.5 MG: 25 TABLET, FILM COATED ORAL at 21:48

## 2025-06-07 RX ADMIN — ACETAMINOPHEN 975 MG: 325 TABLET, FILM COATED ORAL at 21:48

## 2025-06-07 RX ADMIN — HYDROMORPHONE HYDROCHLORIDE 0.2 MG: 0.2 INJECTION, SOLUTION INTRAMUSCULAR; INTRAVENOUS; SUBCUTANEOUS at 21:47

## 2025-06-07 RX ADMIN — TOPIRAMATE 75 MG: 50 TABLET, FILM COATED ORAL at 23:50

## 2025-06-07 ASSESSMENT — ACTIVITIES OF DAILY LIVING (ADL)
ADLS_ACUITY_SCORE: 47

## 2025-06-07 ASSESSMENT — COLUMBIA-SUICIDE SEVERITY RATING SCALE - C-SSRS
6. HAVE YOU EVER DONE ANYTHING, STARTED TO DO ANYTHING, OR PREPARED TO DO ANYTHING TO END YOUR LIFE?: NO
1. IN THE PAST MONTH, HAVE YOU WISHED YOU WERE DEAD OR WISHED YOU COULD GO TO SLEEP AND NOT WAKE UP?: NO
2. HAVE YOU ACTUALLY HAD ANY THOUGHTS OF KILLING YOURSELF IN THE PAST MONTH?: NO

## 2025-06-07 NOTE — ED PROVIDER NOTES
Emergency Department Note      History of Present Illness     Chief Complaint   Fall and Generalized Weakness      HPI   Melissa Jean Penrose is a 74 year old female with a history of  hypertension, memory problem, subdural hematoma, elevated fasting glucose, and TBO who presents via EMS for evaluation of a fall and generalized weakness. Per EMS report patient has alzheimer which is not a big concern. They report she felt weak when she tried to get out of her recliner chair when she fell backwards, unwitnessed, and hit the back of her head. EMS states she was weak and unable to stand on her own when they arrived and had a blood sugar of 111. All other vitals were said to be at normal by EMS. Nurse at the facility have concerns about a possible UTI stating her urine had an abnormal odor and she had incontinence today. Patient endorses what was said above to be true and continues to say she did not lose consciousness but has a bump on the back of her head as she hit the back of her head on the carpeted concrete floor. Explains she felt just fine this morning and was able to eat breakfast and lunch per usual, but felt weak in the afternoon and is still feeling this way currently. Adds that she wasn't able to stand up on her own after the fall as mentioned above, but states she usually is able to stand up and walk around on her own. Patient reports she hasn't walked on her own since she fell. Notes she is not experiencing a severe headache right now but has a history of migraines. Denies experiencing any vision changes, neck pain, numbness, tingling, congestion, sore throat, shortness of breath, abdominal pain, nausea, vomiting, diarrhea, hematochezia, pain in arm, pain in leg, pelvis pain or chest pain. Notes she has low back spine pain at baseline, but notes it was hurting as she was being moved around. Patient also has some pain in her ankles and experiences swelling on them at baseline. No blood thinners.       Independent Historian   EMS as detailed above.    Review of External Notes   I reviewed 10/14/23 ED note for lumbar back pain.    Past Medical History     Medical History and Problem List   Acute blood loss anemia  Cervical spine fracture  Elevated fasting glucose  Fibromyalgia  Subdural hematoma  Memory problem  Migraine headache  Osteoarthritis  Osteopenia  Subdoral hematoma  Hypertension  TBI  UTI    Medications   Azelastine HCl  Benzonatate  Fluticasone propionate  Folic acid  Guaifenesin  Levothyroxine sodium  Metoprolol tartrate  Montelukast sodium  Omeprazole  Potasium chloride  Thiamine HCl  Tiotropium bromide monohydrate  Topiramate  Tramadol HCl      Surgical History   Past Surgical History:   Procedure Laterality Date    ORTHOPEDIC SURGERY      PHACOEMULSIFICATION CLEAR CORNEA WITH STANDARD INTRAOCULAR LENS IMPLANT Right 2/7/2017    Procedure: PHACOEMULSIFICATION CLEAR CORNEA WITH STANDARD INTRAOCULAR LENS IMPLANT;  Surgeon: Brianna Christy MD;  Location: [facility]       Physical Exam     Patient Vitals for the past 24 hrs:   BP Temp Temp src Pulse Resp SpO2   06/07/25 1945 136/74 98.1  F (36.7  C) -- 89 -- 95 %   06/07/25 1700 -- 98.2  F (36.8  C) Temporal -- -- --   06/07/25 1658 -- -- -- -- -- 96 %   06/07/25 1643 -- -- -- -- -- 97 %   06/07/25 1628 -- -- -- -- -- 97 %   06/07/25 1615 123/73 -- -- 83 18 97 %     Physical Exam  Constitutional: Pleasant. Cooperative.   Eyes: Pupils equally round and reactive  HENT: Small hematoma to superior occipital scalp. No marked scalp tenderness. No bony step-off or crepitus. No facial bone tenderness or instability. No periorbital ecchymosis or Lloyd signs. Oropharynx is normal with moist mucus membranes. No evidence for intraoral injury.  Cardiovascular: Regular rate and rhythm and without murmurs.  Respiratory: Normal respiratory effort, lungs are clear bilaterally.  GI: Abdomen is soft, non-tender, non-distended. No guarding, rebound, or  rigidity.  Musculoskeletal: Mild TTP to lower L spine in the midline. No midline C or T spine TTP. Normal painless ROM of the neck. No clavicular tenderness. No upper extremity tenderness. No lower extremity tenderness. Normal ROM of extremities. No tenderness with compression of the rib cage or pelvis.  Skin: No rashes. No lacerations or abrasions noted.  Neurologic: Cranial nerves grossly intact, normal cognition, no focal deficits. Alert and oriented x 3. GCS 15  Psychiatric: Normal affect.  Nursing notes and vital signs reviewed.    Diagnostics     Lab Results   Labs Ordered and Resulted from Time of ED Arrival to Time of ED Departure   BASIC METABOLIC PANEL - Abnormal       Result Value    Sodium 132 (*)     Potassium 4.0      Chloride 99      Carbon Dioxide (CO2) 22      Anion Gap 11      Urea Nitrogen 14.7      Creatinine 0.79      GFR Estimate 78      Calcium 9.1      Glucose 112 (*)    TROPONIN T, HIGH SENSITIVITY - Abnormal    Troponin T, High Sensitivity 29 (*)    ROUTINE UA WITH MICROSCOPIC REFLEX TO CULTURE - Abnormal    Color Urine Yellow      Appearance Urine Clear      Glucose Urine Negative      Bilirubin Urine Negative      Ketones Urine Negative      Specific Gravity Urine 1.017      Blood Urine Negative      pH Urine 7.0      Protein Albumin Urine Negative      Urobilinogen Urine Normal      Nitrite Urine Negative      Leukocyte Esterase Urine Negative      Bacteria Urine Few (*)     RBC Urine <1      WBC Urine 1      Squamous Epithelials Urine <1     CBC WITH PLATELETS AND DIFFERENTIAL - Abnormal    WBC Count 13.0 (*)     RBC Count 4.37      Hemoglobin 12.5      Hematocrit 38.1      MCV 87      MCH 28.6      MCHC 32.8      RDW 12.9      Platelet Count 390      % Neutrophils 73      % Lymphocytes 14      % Monocytes 11      % Eosinophils 1      % Basophils 0      % Immature Granulocytes 1      NRBCs per 100 WBC 0      Absolute Neutrophils 9.5 (*)     Absolute Lymphocytes 1.8      Absolute  Monocytes 1.4 (*)     Absolute Eosinophils 0.2      Absolute Basophils 0.1      Absolute Immature Granulocytes 0.2      Absolute NRBCs 0.0     TROPONIN T, HIGH SENSITIVITY - Abnormal    Troponin T, High Sensitivity 29 (*)        Imaging   XR Chest 2 Views   Final Result   IMPRESSION: Minimal elevation of the right hemidiaphragm. Patchy left basilar atelectasis or scarring. No effusions or pneumothorax. Mild bronchial wall thickening. Heart size is normal. Right rotator cuff repair changes.      Head CT w/o contrast   Final Result   IMPRESSION:     1.  No evidence of acute intracranial hemorrhage or mass effect.      CT Cervical Spine w/o Contrast   Final Result   IMPRESSION:   1.  No evidence of acute fracture or subluxation of the cervical spine by CT imaging.   2.  Postsurgical changes posterior fixation of a remote C2 dens fracture.       CT Lumbar Spine w/o Contrast   Final Result   IMPRESSION:   1.  Acute inferior endplate L4 compression fracture with 15% vertebral body height loss.    2.  Age-indeterminate L1 compression fracture with greater than 75% vertebral body height loss anteriorly.    3.  Age-indeterminate superior plate L5 compression fracture with less than 20% vertebral body height loss.   4.  Intramuscular  hematoma within the left medial psoas muscle measuring 2.9 x 1.5 x 2.4 cm (AP x TV x CC).             EKG   ECG results from 06/07/25   EKG 12-lead, tracing only     Value    Systolic Blood Pressure     Diastolic Blood Pressure     Ventricular Rate 80    Atrial Rate 80    LA Interval 210    QRS Duration 98        QTc 429    P Axis 50    R AXIS -25    T Axis 33    Interpretation ECG      Sinus rhythm with 1st degree A-V block  Moderate voltage criteria for LVH, may be normal variant ( R in aVL , Amos product )  Borderline ECG   Interpreted at 16:30        Independent Interpretation   I personally evaluated the CXR, no obvious PNA noted.    ED Course      Medications Administered    Medications - No data to display    Procedures   Procedures     Discussion of Management   Hospitalist    ED Course   ED Course as of 06/07/25 2019   Sat Jun 07, 2025   1605 I evaluated the patient and obtained the history as noted above         Additional Documentation  None    Medical Decision Making / Diagnosis     CMS Diagnoses: None    MIPS   CT/MRI of the lumbar spine was obtained because of risk factors for fracture (minor trauma in elderly/osteoporosis).            [facility]   Melissa Jean Penrose is a 74 year old female with history of subdural hematoma, hypertension, mild Alzheimer's, who presents to the ED for evaluation of weakness with subsequent fall in which she struck her head.  See HPI as above for additional details. Vitals and physical exam as above.  Differential for weakness was broad and included anemia, arrhythmia, ACS, dehydration, electrolyte abnormality, infectious etiology such as UTI, pneumonia, amongst others.  Patient is not anemic.  ECG without any concerning arrhythmia.  Delta troponin unchanged, no chest pain or dyspnea at this time suggest for ACS. Patient does not appear overtly dehydrated, labs without suggestion for such. No electrolyte abnormalities. No suggestion for UTI on UA. CXR is clear. No other infectious source identified at this time. Exact etiology of patient's weakness is unclear. With respect to the fall, CT of head and C spine are without acute abnormalities. CT of L spine with new L4 compression fracture. Patient unable to pass trial of ambulation secondary to weakness/back pain. Discussed with patient indication for admission. Patient in agreement with this plan. Case discussed with the hospitalist, who agreed to admit the patient. All questions answered. Patient admitted in stable condition.    Disposition   The patient was admitted to the hospital.     Diagnosis     ICD-10-CM    1. Generalized weakness  R53.1       2. Fall, initial encounter  W19.XXXA       3.  Closed compression fracture of L4 lumbar vertebra, initial encounter (H)  S32.040A       4. Compression fracture of L1 vertebra, initial encounter (H)  S32.010A       5. Closed compression fracture of L5 lumbar vertebra, initial encounter (H)  S32.050A       6. Hematoma  T14.8XXA     left psoas muscle      7. Closed head injury, initial encounter  S09.90XA            Scribe Disclosure:  I, Jad Martino, am serving as a scribe at 4:41 PM on 6/7/2025 to document services personally performed by Troy Ball PA-C PA based on my observations and the provider's statements to me.     I, Briseida Rosado, am serving as a scribe  at 4:41 PM on 6/7/2025 to document services personally performed by Troy Ball PA-C PA based on my observations and the provider's statements to me.     This record was created at least in part using electronic voice recognition software, so please excuse any typographical errors.       Troy Ball PA-C  06/07/25 2055

## 2025-06-07 NOTE — ED NOTES
Bed: ED22  Expected date:   Expected time:   Means of arrival:   Comments:  [facility] 1 74F from memory care, fall, head strike

## 2025-06-07 NOTE — ED NOTES
[facility]  ED Nurse Handoff Report    ED Chief complaint: Fall and Generalized Weakness      ED Diagnosis:   Final diagnoses:   None       Code Status: Full Code    Allergies:   Allergies   Allergen Reactions    Penicillins Swelling     Reports facial swelling, tolerated ceftriaxone per Care Everywhere in 10/2014    Tree Nuts  [Nuts] Other (See Comments)     Vomiting, throat closing, digestive issues    Nuts [Peanut-Containing Drug Products] Nausea and Vomiting    Amoxicillin     Morphine     Rofecoxib Unknown and Rash       Patient Story: Pt BIBA from memory care unit, hx of alzhiemers. Sitting in recliner, felt weak and fell down, unwitnessed, hit back of head. No loc. When EMS arrived, noticed patient was weak and unable to stand on own. Nurse at facility concerned for UTI. Bsg 111.   Focused Assessment:  patient is weak, alert and oriented x 3, some confusion to some things but calm and cooperative. Does yell out when slight movements which is her baseline. C/o back pain and weakness.     Treatments and/or interventions provided: labs, CT  Patient's response to treatments and/or interventions: tolerated well.     To be done/followed up on inpatient unit:  pain mgt, PT    Does this patient have any cognitive concerns?: Alzheimer's    Activity level - Baseline/Home:  Independent and Cane  Activity Level - Current:   Stand with assist x2 and Cane    Patient's Preferred language: English   Needed?: No    Isolation: None  Infection: Not Applicable  Sepsis treatment initiated:   Patient tested for COVID 19 prior to admission: YES  Bariatric?: No    Vital Signs:   Vitals:    06/07/25 1615 06/07/25 1700   BP: 123/73    Pulse: 83    Resp: 18    Temp:  98.2  F (36.8  C)   TempSrc:  Temporal   SpO2: 97%        Cardiac Rhythm:     Was the PSS-3 completed:   Yes  What interventions are required if any?               Family Comments: none present   OBS brochure/video discussed/provided to  patient/family: N/A              Name of person given brochure if not patient:               Relationship to patient:     For the majority of the shift this patient's behavior was Green.   Behavioral interventions performed were .    ED NURSE PHONE NUMBER: *[phone]

## 2025-06-07 NOTE — ED TRIAGE NOTES
Pt BIBA from memory care unit, hx of alzhiemers. Sitting in recliner, felt weak and fell down, unwitnessed, hit back of head. No loc. When EMS arrived, noticed patient was weak and unable to stand on own. Nurse at facility concerned for UTI. Bsg 111.

## 2025-06-08 ENCOUNTER — APPOINTMENT (OUTPATIENT)
Dept: CARDIOLOGY | Facility: CLINIC | Age: 75
End: 2025-06-08
Payer: COMMERCIAL

## 2025-06-08 ENCOUNTER — APPOINTMENT (OUTPATIENT)
Dept: MRI IMAGING | Facility: CLINIC | Age: 75
End: 2025-06-08
Attending: PHYSICIAN ASSISTANT
Payer: COMMERCIAL

## 2025-06-08 LAB
ANION GAP SERPL CALCULATED.3IONS-SCNC: 13 MMOL/L (ref 7–15)
BUN SERPL-MCNC: 12.7 MG/DL (ref 8–23)
CALCIUM SERPL-MCNC: 8.9 MG/DL (ref 8.8–10.4)
CHLORIDE SERPL-SCNC: 101 MMOL/L (ref 98–107)
CREAT SERPL-MCNC: 0.61 MG/DL (ref 0.51–0.95)
CRP SERPL-MCNC: 46.31 MG/L
EGFRCR SERPLBLD CKD-EPI 2021: >90 ML/MIN/1.73M2
ERYTHROCYTE [DISTWIDTH] IN BLOOD BY AUTOMATED COUNT: 12.9 % (ref 10–15)
ERYTHROCYTE [SEDIMENTATION RATE] IN BLOOD BY WESTERGREN METHOD: 113 MM/HR (ref 0–30)
GLUCOSE SERPL-MCNC: 112 MG/DL (ref 70–99)
HCO3 SERPL-SCNC: 18 MMOL/L (ref 22–29)
HCT VFR BLD AUTO: 36.7 % (ref 35–47)
HGB BLD-MCNC: 12.5 G/DL (ref 11.7–15.7)
LVEF ECHO: NORMAL
MCH RBC QN AUTO: 28.7 PG (ref 26.5–33)
MCHC RBC AUTO-ENTMCNC: 34.1 G/DL (ref 31.5–36.5)
MCV RBC AUTO: 84 FL (ref 78–100)
PLATELET # BLD AUTO: 375 10E3/UL (ref 150–450)
POTASSIUM SERPL-SCNC: 3.6 MMOL/L (ref 3.4–5.3)
RBC # BLD AUTO: 4.35 10E6/UL (ref 3.8–5.2)
SODIUM SERPL-SCNC: 132 MMOL/L (ref 135–145)
WBC # BLD AUTO: 10.4 10E3/UL (ref 4–11)

## 2025-06-08 PROCEDURE — 120N000001 HC R&B MED SURG/OB

## 2025-06-08 PROCEDURE — 36415 COLL VENOUS BLD VENIPUNCTURE: CPT | Performed by: PHYSICIAN ASSISTANT

## 2025-06-08 PROCEDURE — A9585 GADOBUTROL INJECTION: HCPCS | Performed by: PHYSICIAN ASSISTANT

## 2025-06-08 PROCEDURE — G0378 HOSPITAL OBSERVATION PER HR: HCPCS

## 2025-06-08 PROCEDURE — 255N000002 HC RX 255 OP 636: Performed by: PHYSICIAN ASSISTANT

## 2025-06-08 PROCEDURE — 86140 C-REACTIVE PROTEIN: CPT | Performed by: PHYSICIAN ASSISTANT

## 2025-06-08 PROCEDURE — 80048 BASIC METABOLIC PNL TOTAL CA: CPT

## 2025-06-08 PROCEDURE — 36415 COLL VENOUS BLD VENIPUNCTURE: CPT

## 2025-06-08 PROCEDURE — 999N000208 ECHOCARDIOGRAM COMPLETE

## 2025-06-08 PROCEDURE — 250N000013 HC RX MED GY IP 250 OP 250 PS 637

## 2025-06-08 PROCEDURE — 93306 TTE W/DOPPLER COMPLETE: CPT | Mod: 26 | Performed by: INTERNAL MEDICINE

## 2025-06-08 PROCEDURE — 85652 RBC SED RATE AUTOMATED: CPT | Performed by: PHYSICIAN ASSISTANT

## 2025-06-08 PROCEDURE — 87040 BLOOD CULTURE FOR BACTERIA: CPT | Performed by: PHYSICIAN ASSISTANT

## 2025-06-08 PROCEDURE — 99222 1ST HOSP IP/OBS MODERATE 55: CPT | Performed by: PHYSICIAN ASSISTANT

## 2025-06-08 PROCEDURE — 85018 HEMOGLOBIN: CPT

## 2025-06-08 PROCEDURE — 72158 MRI LUMBAR SPINE W/O & W/DYE: CPT

## 2025-06-08 PROCEDURE — 99233 SBSQ HOSP IP/OBS HIGH 50: CPT | Performed by: INTERNAL MEDICINE

## 2025-06-08 PROCEDURE — 250N000013 HC RX MED GY IP 250 OP 250 PS 637: Performed by: STUDENT IN AN ORGANIZED HEALTH CARE EDUCATION/TRAINING PROGRAM

## 2025-06-08 PROCEDURE — 255N000002 HC RX 255 OP 636

## 2025-06-08 RX ORDER — CALCIUM CARBONATE 500 MG/1
500 TABLET, CHEWABLE ORAL DAILY PRN
Status: DISCONTINUED | OUTPATIENT
Start: 2025-06-08 | End: 2025-06-11 | Stop reason: HOSPADM

## 2025-06-08 RX ORDER — GADOBUTROL 604.72 MG/ML
11 INJECTION INTRAVENOUS ONCE
Status: COMPLETED | OUTPATIENT
Start: 2025-06-08 | End: 2025-06-08

## 2025-06-08 RX ADMIN — TOPIRAMATE 75 MG: 50 TABLET, FILM COATED ORAL at 20:40

## 2025-06-08 RX ADMIN — PANTOPRAZOLE SODIUM 40 MG: 40 TABLET, DELAYED RELEASE ORAL at 05:42

## 2025-06-08 RX ADMIN — MONTELUKAST 10 MG: 10 TABLET, FILM COATED ORAL at 20:41

## 2025-06-08 RX ADMIN — GADOBUTROL 11 ML: 604.72 INJECTION INTRAVENOUS at 11:14

## 2025-06-08 RX ADMIN — TOPIRAMATE 75 MG: 50 TABLET, FILM COATED ORAL at 08:31

## 2025-06-08 RX ADMIN — PERFLUTREN 10 ML: 6.52 INJECTION, SUSPENSION INTRAVENOUS at 13:45

## 2025-06-08 RX ADMIN — POTASSIUM CHLORIDE 20 MEQ: 750 TABLET, EXTENDED RELEASE ORAL at 08:31

## 2025-06-08 RX ADMIN — ACETAMINOPHEN 975 MG: 325 TABLET, FILM COATED ORAL at 14:34

## 2025-06-08 RX ADMIN — LEVOTHYROXINE SODIUM 100 MCG: 100 TABLET ORAL at 05:42

## 2025-06-08 RX ADMIN — METOPROLOL TARTRATE 37.5 MG: 25 TABLET, FILM COATED ORAL at 20:40

## 2025-06-08 RX ADMIN — METHOCARBAMOL 500 MG: 500 TABLET ORAL at 05:42

## 2025-06-08 RX ADMIN — METOPROLOL TARTRATE 37.5 MG: 25 TABLET, FILM COATED ORAL at 08:31

## 2025-06-08 RX ADMIN — ACETAMINOPHEN 975 MG: 325 TABLET, FILM COATED ORAL at 08:31

## 2025-06-08 RX ADMIN — ACETAMINOPHEN 975 MG: 325 TABLET, FILM COATED ORAL at 20:40

## 2025-06-08 ASSESSMENT — ACTIVITIES OF DAILY LIVING (ADL)
ADLS_ACUITY_SCORE: 38
DEPENDENT_IADLS:: CLEANING;COOKING;LAUNDRY;SHOPPING;MEAL PREPARATION;MEDICATION MANAGEMENT;MONEY MANAGEMENT;TRANSPORTATION
ADLS_ACUITY_SCORE: 38
ADLS_ACUITY_SCORE: 47
ADLS_ACUITY_SCORE: 38
ADLS_ACUITY_SCORE: 42
ADLS_ACUITY_SCORE: 38
ADLS_ACUITY_SCORE: 38
ADLS_ACUITY_SCORE: 42
ADLS_ACUITY_SCORE: 38
ADLS_ACUITY_SCORE: 47
ADLS_ACUITY_SCORE: 38

## 2025-06-08 NOTE — PROGRESS NOTES
Admission/Transfer from: ED  2 RN skin assessment completed. Yes  Name of 2nd RN: Laurence Rice  Significant findings include:   --Blanchable redness to lei buttocks, sacrum, coccyx; preventative sacral mepilex applied.  --Redness/excoriation to abdominal fold, L>R.   Area cleansed and Interdry applied   --Bump to back of head where pt fell. No laceration or open areas  Provider Paged for WO Nurse Consult Order? No

## 2025-06-08 NOTE — CONSULTS
"Neurosurgery Consult    Assessment  73 yo female presenting with low back pain radiating to left thigh with lumbar MRI reveals left psoas abscess, possible L4-L5 osteomyelitis/diskitis and chronic T12 and L1 compression fractures.    Plan:  No urgent Neurosurgical intervention indicated.  Consult ID.  CRP ESR  BC    HPI  Melissa Jean Penrose is a 74 year old female who has a history of alzheimer's with mild cognitive deficits and lives in an assisted living facility. Ms. Penrose presents for generalized weakness and fall. She reports that on the morning of 6/7/2025, she was feeling generally well. However, she later reported to staff that she felt weak while sitting in her recliner. Despite this, she attempted to get up, at which point she fell backwards, hitting her back and head on the floor. Of note, the floor is apparently concrete with a thin layer of carpet. The fall was unwitnessed, but she denies any loss of consciousness. When staff arrived, she was unable to get up, so EMS was called. When they arrived, she was still unable to stand and was reporting pain in both her back and head. She was also noted to have a prominent \"bump\" on the back of her scalp, but no laceration or contusion.      Upon arrival to the ED, a head CT was completed showing no acute intracranial process. No fracture or subluxation in the cervical vertebrae. However, she is noted to have an acute L4 compression fracture, as well as multiple other age-indeterminate lumbar compression fractures and Neurosurgery was consulted.  She states she has mid lower back pain with radiation to left thigh which she states has improved some since admission.     Medical history  Acute blood loss anemia  Cervical spine fracture  Elevated fasting glucose  Fibromyalgia  Subdural hematoma  Memory problem  Migraine headache  Osteoarthritis  Osteopenia  Subdoral hematoma  Hypertension  TBI  UTI    Exam  B/P: 134/63, T: 98, P: 65, R: 18   Awake and alert, pain " on palpation of lower lumbar spine.  Moving all four extremities well.  Slight decrease weakness with iliopsoas at 4+/5 bilaterally but otherwise strength intact, sensation intact to lower extremities.    Imaging    Lumbar CT:    IMPRESSION:  1.  Acute inferior endplate L4 compression fracture with 15% vertebral body height loss.   2.  Age-indeterminate L1 compression fracture with greater than 75% vertebral body height loss anteriorly.   3.  Age-indeterminate superior plate L5 compression fracture with less than 20% vertebral body height loss.  4.  Intramuscular  hematoma within the left medial psoas muscle measuring 2.9 x 1.5 x 2.4 cm (AP x TV x CC).       Lumbar MRI:    IMPRESSION:  1.  L4-5 disc and endplate changes with adjacent paraspinous thickening and enhancement. Left psoas fluid collection. 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 Other possibilities include osteoporotic   fractures or less likely bone metastases.  2.  Chronic T12 and L1 fractures.  3.  Lumbar spondylosis.    LABS:    WBC=13.0 yesterday and 10.4 today.    Discussed with Dr. Serna.    Yvonne Cheung, CRISTOBALS  [facility] Neurosurgery  [address] [address]

## 2025-06-08 NOTE — PHARMACY-ADMISSION MEDICATION HISTORY
Pharmacist Admission Medication History    Admission medication history is complete. The information provided in this note is only as accurate as the sources available at the time of the update.    Information Source(s): Facility (U/NH/) medication list/MAR via updated per list from [facility]. Tried to confirm last doses of medications this evening. Left message. No call back at the time of documenting this note.       Changes made to PTA medication list:  Added: ammonium lactated, flurbiprofen, levothyroxine, metoclopramide potassium chloride, prevident, Maxalt, Spirva, tramadol, triamcinolone  Deleted: imitrex, Flexeril, Depakote, Advair, losartan, meclizine, mirtazapine, Nasonex  Changed: APAP dose updated, azelastine changed from BID to daily, metoprolol changed from 25mg BID --> 37.5mg BID, topiramate changed from 25mg BID to 75mg BID, Vitamin C changed to CR formulation    Allergies reviewed with patient and updates made in EHR: no    Medication History Completed By: Marie Dela Cruz, Ramo 6/7/2025 9:09 PM    [facility] Med List   Medication Sig Last Dose/Taking    acetaminophen (TYLENOL) 325 MG tablet Take 650 mg by mouth every 6 hours as needed for mild pain. Taking As Needed    ammonium lactate (AMLACTIN) 12 % external cream Apply topically 2 times daily. For itchy skin Taking    ascorbic acid (VITAMIN C) 500 MG CPCR CR capsule Take 500 mg by mouth daily. Taking    azelastine (ASTELIN) 0.1 % nasal spray Spray 1 spray into both nostrils daily. Taking    DEEP SEA NASAL SPRAY 0.65 % nasal spray Spray 1-2 sprays in nostril 2 times daily. And daily as needed. Taking    Dextromethorphan-guaiFENesin  MG/5ML syrup Take 10 mLs by mouth every 4 hours as needed for cough (and or congestion). Taking As Needed    flurbiprofen (ANSAID) 100 MG tablet Take 100 mg by mouth every 8 hours as needed (Pain). Taking As Needed    fluticasone (FLONASE) 50 MCG/ACT nasal spray INHALE 1-2 SPRAYS INTO BOTH NOSTRILS  ONCE DAILY Taking    folic acid (FOLVITE) 1 MG tablet TAKE 1 TAB BY MOUTH ONCE DAILY Taking    guaiFENesin (MUCINEX) 600 MG 12 hr tablet Take 600 mg by mouth 2 times daily. Taking    levothyroxine (SYNTHROID/LEVOTHROID) 100 MCG tablet Take 100 mcg by mouth every morning (before breakfast). Taking    metoclopramide (REGLAN) 10 MG tablet Take 10-20 mg by mouth See Admin Instructions. Every 4-6 hours as needed for GERD Taking    metoprolol tartrate (LOPRESSOR) 25 MG tablet Take 37.5 mg by mouth 2 times daily. Taking    montelukast (SINGULAIR) 10 MG tablet TAKE 1 TAB BY MOUTH AT BEDTIME Taking    omeprazole (PRILOSEC) 20 MG CR capsule TAKE 1 CAP BY MOUTH ONCE DAILY Taking    potassium chloride ER (K-TAB/KLOR-CON) 10 MEQ CR tablet Take 20 mEq by mouth daily. Taking    rizatriptan (MAXALT) 10 MG tablet Take 10 mg by mouth at onset of headache for migraine. May repeat after 2 hours. Max 2 tabs/day and 10 days/month Taking As Needed    sodium fluoride (SODIUM FLUORIDE 5000 PLUS) 1.1 % CREA Apply to affected area daily as needed. Brush on teeth and spit as needed as directed Taking As Needed    terbinafine (LAMISIL) 1 % external cream Apply topically 2 times daily. Apply under right breast for rash Taking    tiotropium (SPIRIVA RESPIMAT) 2.5 MCG/ACT inhaler Inhale 2 puffs into the lungs daily. Taking    topiramate (TOPAMAX) 50 MG tablet Take 75 mg by mouth 2 times daily. Taking    traMADol (ULTRAM) 50 MG tablet Take 12.5 mg by mouth 2 times daily. Taking    triamcinolone (KENALOG) 0.1 % external cream Apply topically 4 times daily as needed for irritation. Taking As Needed    VITAMIN B-1 100 MG tablet TAKE 1 TAB BY MOUTH ONCE DAILY Taking    vitamin D2 (ERGOCALCIFEROL) 30153 units (1250 mcg) capsule Take 50,000 Units by mouth once a week. Takes on Mondays Taking

## 2025-06-08 NOTE — PROGRESS NOTES
[facility]  Medicine Progress Note - Hospitalist Service  Date of Admission:  6/7/2025    Assessment & Plan   78-year-old female with history of alcohol abuse, Alzheimer's dementia and residing in a memory care assisted living facility, history of fall and vertebral compression fracture, history of subdural hematoma, hypertension, hypothyroidism who presented after an unwitnessed fall and was found to have an L4 acute compression fracture.    Unwitnessed fall  Acute L4 compression fracture  Prior vertebral fractures (L1, L5)  H/o posterior cervical fixation C2  Generalized weakness and imbalance  Head CT negative for ICH or mass effect.  Remains at ongoing fall risk in setting of osteoporosis and progressively declining executive function congruent to dementia.  -change activity to up with assist  -neurosurgery input appreciated. Anticipate non-surgical approach with brace and therapies  -PT and OT  -Care coordination and social work    Addendum 6 PM:  Neurosurgery note reviewed after lumbar MRI completed.  Lumbar MRI reveals left psoas abscess, possible L4-L5 osteomyelitis/diskitis.  ID consult ordered.  May need IR consult for consideration of psoas fluid collection drainage. Will place order for IR consult Monday AM 6/9.  Changed to inpatient status.    Alzheimer's dementia  H/o subdural hematoma, traumatic brain injury  Noted.  -hoping to return to memory care unit    Alcohol abuse history  Noted.    Chronic, stable medical issues:  Hypothyroidism: continue levothyroxine  Migraine headaches: Continue Topamax  Hypertension: Continue metoprolol       Observation Goals: -diagnostic tests and consults completed and resulted, -vital signs normal or at patient baseline, -adequate pain control on oral analgesics, -returns to baseline functional status, -safe disposition plan has been identified, Nurse to notify provider when observation goals have been met and patient is ready for  "discharge.  Diet: Regular Diet Adult    DVT Prophylaxis: Pneumatic Compression Devices and Ambulate every shift  Hernadez Catheter: Not present  Lines: None     Cardiac Monitoring: ACTIVE order. Indication: Syncope- low cardiac risk (24 hours)  Code Status: Full Code      Clinically Significant Risk Factors Present on Admission         # Hyponatremia: Lowest Na = 132 mmol/L in last 2 days, will monitor as appropriate           # Hypertension: Noted on problem list           # Obesity: Estimated body mass index is 37.71 kg/m  as calculated from the following:    Height as of this encounter: 1.778 m (5' 10\").    Weight as of this encounter: 119.2 kg (262 lb 12.6 oz).              Social Drivers of Health    Tobacco Use: Medium Risk (4/19/2021)    Patient History     Smoking Tobacco Use: Former     Smokeless Tobacco Use: Never          Disposition Plan     Medically Ready for Discharge: Anticipated Tomorrow if able to return to memory care assisted living unit.              Suzanne Horne MD  Hospitalist Service  [facility]  Securely message with [employer] (more info)  Text page via [employer] Paging/Directory   ______________________________________________________________________    Interval History   Admitted last evening. H&P and ED course reviewed in detail.    Patient seen just prior to heading down for additional imaging.  I briefly discussed her pain plan and that a likely recommendation will be for a brace.  She was adamant that she would not consider even trying her wearing a brace.  She very much wishes to go back to her care facility as soon as possible.    Physical Exam   Vital Signs: Temp: 98  F (36.7  C) Temp src: Oral BP: 134/63 Pulse: 65   Resp: 18 SpO2: 95 % O2 Device: None (Room air)    Weight: 262 lbs 12.61 oz    General Appearance: Awake, alert, pleasant and conversant  Respiratory: clear to auscultation bilaterally with good inspiratory effort, no wheezes or " crackles  Cardiovascular: Regular, no murmur, no lower extremity edema  GI: Soft, nontender, nondistended  Skin: No acute rashes or lesions on exposed skin, no jaundice  Neuro/psych: AO x 3, no focal deficits, no tremors, speech normal      Medical Decision Making       51 MINUTES SPENT BY ME on the date of service doing chart review, history, exam, documentation & further activities per the note.  MANAGEMENT DISCUSSED with the following over the past 24 hours: Patient, bedside nurse   NOTE(S)/MEDICAL RECORDS REVIEWED over the past 24 hours: Nursing notes, history and physical, consultant notes that are available, pain flowsheet, medication administration record  Tests ORDERED & REVIEWED in the past 24 hours:  - See lab/imaging results included in the data section of the note      Data     I have personally reviewed the following data over the past 24 hrs:    10.4  \   12.5   / 375     132 (L) 101 12.7 /  112 (H)   3.6 18 (L) 0.61 \     Trop: 29 (H) BNP: N/A       Imaging results reviewed over the past 24 hrs:   Recent Results (from the past 24 hours)   CT Lumbar Spine w/o Contrast    Narrative    EXAM: CT LUMBAR SPINE W/O CONTRAST  LOCATION: [facility]  DATE: 6/7/2025    INDICATION: fall, back pain  COMPARISON: 10/14/2023  TECHNIQUE: Routine CT Lumbar Spine without IV contrast. Multiplanar reformats. Dose reduction techniques were used.    FINDINGS:  Nomenclature is based on 5 lumbar type vertebral bodies.   Acute inferior endplate L4 compression fracture with 15% vertebral body height loss. Age-indeterminate L1 compression fracture with greater than 75% vertebral body height loss anteriorly.   Age-indeterminate superior plate L5 compression fracture with less than 20% vertebral body height loss. Anterolisthesis of L4 and L5 measuring 2 mm. The vertebral bodies of the lumbar spine otherwise have normal stature and alignment. Intramuscular    hematoma within the left medial psoas muscle  measuring 2.9 x 1.5 x 2.4 cm (AP x TV x CC).   Unremarkable visualized bony pelvis.    T12-L1: Normal disc height. No herniation. Normal facets. No spinal canal or neural foraminal stenosis.    L1-L2: Normal disc height. No herniation. Normal facets. No spinal canal or neural foraminal stenosis.    L2-L3: Normal disc height. No herniation. Normal facets. No spinal canal or neural foraminal stenosis.    L3-L4: Normal disc height. No herniation. Normal facets. No spinal canal or neural foraminal stenosis.    L4-L5: Normal disc height. No herniation. Normal facets. No spinal canal or neural foraminal stenosis.    L5-S1: Normal disc height. No herniation. Normal facets. No spinal canal or neural foraminal stenosis.      Impression    IMPRESSION:  1.  Acute inferior endplate L4 compression fracture with 15% vertebral body height loss.   2.  Age-indeterminate L1 compression fracture with greater than 75% vertebral body height loss anteriorly.   3.  Age-indeterminate superior plate L5 compression fracture with less than 20% vertebral body height loss.  4.  Intramuscular  hematoma within the left medial psoas muscle measuring 2.9 x 1.5 x 2.4 cm (AP x TV x CC).      CT Cervical Spine w/o Contrast    Narrative    EXAM: CT CERVICAL SPINE W/O CONTRAST  LOCATION: [facility]  DATE: 6/7/2025    INDICATION: fall, struck head  COMPARISON: None.  TECHNIQUE: Routine CT Cervical Spine without IV contrast. Multiplanar reformats. Dose reduction techniques were used.    FINDINGS:  No evidence of acute fracture or subluxation of the cervical spine by CT imaging. Postsurgical changes posterior fixation of a remote C2 dens fracture. Anterolisthesis of C3 on C4 measuring 2 mm. The vertebral bodies of the cervical spine otherwise have   normal stature and alignment. The disc spaces are well-maintained. No significant degenerative changes. No extraspinal abnormality.     Craniovertebral junction and C1-C2:  Normal.    C2-C3: No posterior disc bulge or spinal canal narrowing. No neural foraminal narrowing.     C3-C4: No posterior disc bulge or spinal canal narrowing. Uncovertebral joint disease and facet arthropathy with severe bilateral neural foraminal narrowing.     C4-C5: No posterior disc bulge or spinal canal narrowing. Uncovertebral joint disease and facet arthropathy with severe bilateral neural foraminal narrowing.     C5-C6: No posterior disc bulge or spinal canal narrowing. Uncovertebral joint disease and facet arthropathy with severe bilateral neural foraminal narrowing.     C6-C7: No posterior disc bulge or spinal canal narrowing. No neural foraminal.     C7-T1: No posterior disc bulge or spinal canal narrowing. No neural foraminal.       Impression    IMPRESSION:  1.  No evidence of acute fracture or subluxation of the cervical spine by CT imaging.  2.  Postsurgical changes posterior fixation of a remote C2 dens fracture.    Head CT w/o contrast    Narrative    EXAM: CT HEAD W/O CONTRAST  LOCATION: [facility]  DATE: 6/7/2025    INDICATION: fall, struck head  COMPARISON: None.  TECHNIQUE: Routine CT Head without IV contrast. Multiplanar reformats. Dose reduction techniques were used.    FINDINGS:  INTRACRANIAL CONTENTS: No evidence of acute intracranial hemorrhage or mass effect. Scattered foci of decreased attenuation within the cerebral hemispheric white matter which are nonspecific, though most commonly ascribed to chronic small vessel ischemic   disease. The ventricles and sulci are prominent consistent with mild brain parenchymal volume loss. Gray-white matter differentiation is maintained. The presumed colonization material within the left jugular bulb.    VISUALIZED ORBITS/SINUSES/MASTOIDS: The globes are unremarkable. The partially imaged paranasal sinuses, mastoid air cells and middle ear cavities are unremarkable.      BONES/SOFT TISSUES: The visualized skull base and  calvarium are unremarkable.      Impression    IMPRESSION:    1.  No evidence of acute intracranial hemorrhage or mass effect.   XR Chest 2 Views    Narrative    EXAM: XR CHEST 2 VIEWS  LOCATION: [facility]  DATE: 6/7/2025    INDICATION: generalized weakness, cough, rule out pneumonia  COMPARISON: 3/16/2020      Impression    IMPRESSION: Minimal elevation of the right hemidiaphragm. Patchy left basilar atelectasis or scarring. No effusions or pneumothorax. Mild bronchial wall thickening. Heart size is normal. Right rotator cuff repair changes.

## 2025-06-08 NOTE — PROGRESS NOTES
"Arrived from ED about 2145. Pt fell asleep around midnight and appeared to sleep between cares.     PRIMARY Concern: Generalized weakness, unwitnessed fall w/ head strike, acute L4 compression fracture, and IM hematoma of L psoas muscle  SAFETY RISK Concerns (fall risk, behaviors, etc.): Fall risk      Aggression Tool Color: Green while awake. Yellow when woken up from sleep.   Isolation/Type: N/A  Tests/Procedures for NEXT shift: AM labs, Orthostatics, Echo  Consults? (Pending/following, signed-off?) Neurosurgery, SW/CC, and PT/OT to see  Where is patient from? (Home, TCU, etc.): Memory Care Facility  Other Important info for NEXT shift:   Anticipated DC date & active delays: 1-2 days pending clinical progress  _____________________________________________________________________________  SUMMARY NOTE:   Orientation/Cognitive: Alert. Able to answer all orientation questions appropriately except could not remember if it was 2024 or 2025. During our conversation she also stated she used to have lunch w/ [employer] 3x/week and that she knows the past Presidents of the [place] Pt has limited insight into big picture situation and is very forgetful; she asks same questions several times throughout short conversations. Calm and cooperative on arrival. However, pt became very agitated when woken up overnight for q 4 neuros/vitals. Vitals were obtained but repeat neuros were not. Pt looked around and said \"Where am I? I'm not at home!\" I reoriented pt to which she replied \"Get away from me I'm not telling you anything! I was dead asleep; you people drive me crazy!\" And pt started to swing her arms at staff. After staff left she quickly fell back asleep. She woke up a few hours later and called for pain medicine and was quite pleasant and cooperative.     Observation Goals (Met/ Not Met): NOT MET  Mobility Level/Assist Equipment: Not OOB. Explained to pt about orthostatics, attempted to help pt sit at edge of bed but unable r/t " pain   Antibiotics & Plan (IV/po, length of tx left): N/A  Pain Management: Administered ordered 1x dose IV dilaudid. Scheduled tylenol, robaxin, and cold packs given. PRN oxy available.   Tele/VS/O2: Tele: VSS on RA.   ABNL Lab/BG: NA: 132. Trops 29 and 29. WBC: 13.0. AM labs pending  Diet: Regular diet; will need help ordering meals  Bowel/Bladder: Incontinent bladder. No BM overnight  Skin Concerns:   --Blanchable redness to lei buttocks, sacrum, coccyx; preventative sacral mepilex applied.  --Redness/excoriation to abdominal fold, L>R.   Area cleansed and Interdry applied   --Bump to back of head where pt fell. No laceration or open areas   Drains/Devices: Purewick in place w/ adequate output. LUE PIV SL

## 2025-06-08 NOTE — PROGRESS NOTES
RECEIVING UNIT ED HANDOFF REVIEW    ED Nurse Handoff Report was reviewed by: Brittney Contreras RN on June 7, 2025 at 8:53 PM        [As Noted in HPI] : as noted in HPI [Negative] : Respiratory

## 2025-06-08 NOTE — PROGRESS NOTES
Observation goals  PRIOR TO DISCHARGE       Comments:   -diagnostic tests and consults completed and resulted: not met  -vital signs normal or at patient baseline: met  -adequate pain control on oral analgesics: met  -returns to baseline functional status:not met  -safe disposition plan has been identified: not met  Nurse to notify provider when observation goals have been met and patient is ready for discharge.

## 2025-06-08 NOTE — PLAN OF CARE
Goal Outcome Evaluation:      Plan of Care Reviewed With: caregiver, friend, durable power of           Outcome Evaluation: Discharge to TCU vs [facility] pending PT recommendation and level of assist.

## 2025-06-08 NOTE — PROGRESS NOTES
Admitting Diagnosis: Hematoma [T14.8XXA]  Generalized weakness [R53.1]  Closed head injury, initial encounter [S09.90XA]  Fall, initial encounter [W19.XXXA]  Closed compression fracture of L5 lumbar vertebra, initial encounter (H) [S32.050A]  Closed compression fracture of L4 lumbar vertebra, initial encounter (H) [S32.040A]  Compression fracture of L1 vertebra, initial encounter (H) [S32.010A]   Vitals WNL  Pain 5/10. Taking scheduled medications  A&Ox3 and very forgetful  Voiding pur wick in place  Mobility not oob due to pain when attempting  Tele SR, 1st degree av block  CMS intact  Lung Sounds clear on RA   GI WNL    Orders Placed This Encounter      Regular Diet Adult       Plan: pt made inpt at the end of shift due to MRI findings, NSGY following, ID consulted.

## 2025-06-08 NOTE — CONSULTS
Care Management Initial Consult    General Information  Assessment completed with: Caregiver, Friend, POA/friend Reena Geller and [facility] nurse Niurka  Type of CM/SW Visit: Initial Assessment    Primary Care Provider verified and updated as needed: Yes (Philipp)   Readmission within the last 30 days: no previous admission in last 30 days      Reason for Consult: discharge planning  Advance Care Planning: Advance Care Planning Reviewed: no concerns identified, present on chart          Communication Assessment  Patient's communication style: spoken language (English or Bilingual)    Hearing Difficulty or Deaf: no   Wear Glasses or Blind: no    Cognitive  Cognitive/Neuro/Behavioral: .WDL except  Level of Consciousness: alert  Arousal Level: opens eyes spontaneously  Orientation: disoriented to, situation (Hx Alzheimer's, limited insight into big picture situation. repeats same questions questions frequently)  Mood/Behavior: calm, cooperative  Best Language: 0 - No aphasia  Speech: clear, spontaneous    Living Environment:   People in home: facility resident     Current living Arrangements: assisted living  Name of Facility: [facility], Specialty Care unit   Able to return to prior arrangements: other (see comments) (pending level of assist; may need TCU)       Family/Social Support:  Care provided by: self, other (see comments) (staff)  Provides care for: no one, unable/limited ability to care for self     Support system: Friend          Description of Support System:           Current Resources:   Patient receiving home care services: No        Community Resources:    Equipment currently used at home: cane, straight, walker, rolling  Supplies currently used at home:      Employment/Financial:  Employment Status: retired        Financial Concerns:             Does the patient's insurance plan have a 3 day qualifying hospital stay waiver?  No    Lifestyle & Psychosocial Needs:  Social Drivers of Health      Food Insecurity: Low Risk  (6/7/2025)    Food Insecurity     Within the past 12 months, did you worry that your food would run out before you got money to buy more?: No     Within the past 12 months, did the food you bought just not last and you didn t have money to get more?: No   Depression: Not at risk (1/8/2019)    PHQ-2     PHQ-2 Score: 0   Housing Stability: Low Risk  (6/7/2025)    Housing Stability     Do you have housing? : Yes     Are you worried about losing your housing?: No   Tobacco Use: Medium Risk (4/19/2021)    Patient History     Smoking Tobacco Use: Former     Smokeless Tobacco Use: Never     Passive Exposure: Not on file   Financial Resource Strain: Low Risk  (6/7/2025)    Financial Resource Strain     Within the past 12 months, have you or your family members you live with been unable to get utilities (heat, electricity) when it was really needed?: No   Alcohol Use: Not on file   Transportation Needs: Low Risk  (6/7/2025)    Transportation Needs     Within the past 12 months, has lack of transportation kept you from medical appointments, getting your medicines, non-medical meetings or appointments, work, or from getting things that you need?: No   Physical Activity: Not on file   Interpersonal Safety: Not on file   Stress: Not on file   Social Connections: Not on file   Health Literacy: Not on file       Functional Status:  Prior to admission patient needed assistance:   Dependent ADLs:: Ambulation-cane, Bathing  Dependent IADLs:: Cleaning, Cooking, Laundry, Shopping, Meal Preparation, Medication Management, Money Management, Transportation       Mental Health Status:  Mental Health Status: Other (see comment) (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    Spoke with her friend Reena over the phone who states she is her HCA and POA for finances.  Explained the Medicare Observation Notice and left it at bedside.    Spoke also with Niurka, nurse from the [facility].    Patient resides at:    [facility], Specialty Care Unit (locked unit)  Phone:  [phone]  DON:  Becky  Fax:  [phone]  Pharmacy:  [facility] (set as discharge pharmacy)    Niurka states patient is independent in her ambulation.  She is reminded to use a walker but refuses.  She hoards food in her room.  She has incontinence of late which is new for her and wondering if due to her compression fracture; sent a message to hospitalist to be made aware.    The [facility] expects her to be at her independent baseline to return there, otherwise they recommend TCU.    Friend Reena states that patient is argumentative at times; she encourages her to exercise and she refuses.  She has been gaining weight, she overeats.  Reena will be the main contact for discharge planning.    Spoke with patient at bedside after her echocardiogram was completed; informed her CM is following for discharge planning.    Next Steps: watch for PT recommendations and level of assist; may need TCU.      Ena Brown RN  Inpatient Float Care Coordinator  [facility]  [email or web address]

## 2025-06-08 NOTE — PROGRESS NOTES
OBS GOALS     -diagnostic tests and consults completed and resulted: NOT MET    -vital signs normal or at patient baseline: MET    -adequate pain control on oral analgesics: IN PROGRESS    -returns to baseline functional status: NOT MET    -safe disposition plan has been identified: NOT MET

## 2025-06-08 NOTE — H&P
[facility]    History and Physical - Hospitalist Service       Date of Admission:  6/7/2025    Assessment & Plan      Melissa Jean Penrose is a 74 year old female admitted on 6/7/2025 for evaluation of generalized weakness and fall. She has a past medical history of hypertension, memory problem, subdural hematoma, elevated fasting glucose, and TBO.     Mechanical fall  Generalized weakness  Acute L4 compression fracture  Ms. Penrose presents on 6/7/2025 for evaluation for generalized weakness and fall. She has a history of alzheimer's and lives in an assisted living facility. She reports that on the morning of 6/7/2025, she was feeling well. However, later in the afternoon, she reported to staff that she felt weak. Despite this, she attempted to get out of her recliner, and fell backwards. The fall was unwitnessed and she apparently did hit her head. When EMS arrived, she was unable to stand on her own, so was brought to the ED for further evaluation. Of note, she typically ambulates independently. She denies any bowel/urine incontinence, numbness or tingling in her extremities..   -Admit under observation  -Q4h vital signs  -Fall precautions  -Q4h neuro and CMS checks  -Orthostatic blood pressure checks q shift  -Neurosurgery consultation  -PT/OT consultations  -SW consultation  -Continuous telemetry monitoring  -Echocardiogram  -Scheduled Tylenol  -Oxycodone 2.5-5 mg for severe pain  -Robaxin for muscle spasm    Hypertension  -Resume PTA metoprolol 37.5 mg BID with parameters    Migraines  -Resume PTA scheduled Topamax 50 mg daily    Hypothyroidism  TSH: 3.16  Resume PTA levothyroxine 100 mcg daily          Diet:  Regular diet  DVT Prophylaxis: Pneumatic Compression Devices  Hernadez Catheter: Not present  Lines: None     Cardiac Monitoring: None  Code Status:  Full Code    Clinically Significant Risk Factors Present on Admission         # Hyponatremia: Lowest Na = 132 mmol/L in last 2 days,  "will monitor as appropriate           # Hypertension: Noted on problem list                    Disposition Plan   Medically Ready for Discharge: Anticipated Tomorrow     The patient's care was discussed with the Attending Physician, Dr. Perez.    Luis A Hale NP  Hospitalist Service  [facility]  Securely message with [employer] (more info)  Text page via [facility] Paging/Directory     ______________________________________________________________________    Chief Complaint   Fall, head and back pain    History is obtained from the patient    History of Present Illness   Melissa Jean Penrose is a 74 year old female who has a history of alzheimer's with mild cognitive deficits and lives in an assisted living facility. Ms. Penrose presents for generalized weakness and fall. She reports that on the morning of 6/7/2025, she was feeling generally well. However, she later reported to staff that she felt weak while sitting in her recliner. Despite this, she attempted to get up, at which point she fell backwards, hitting her back and head on the floor. Of note, the floor is apparently concrete with a thin layer of carpet. The fall was unwitnessed, but she denies any loss of consciousness. When staff arrived, she was unable to get up, so EMS was called. When they arrived, she was still unable to stand and was reporting pain in both her back and head. She was also noted to have a prominent \"bump\" on the back of her scalp, but no laceration or contusion.     Upon arrival to the ED, a head CT was completed showing no acute intracranial process. No fracture or subluxation in the cervical vertebrae. However, she is noted to have an acute L4 compression fracture, as well as multiple other age-indeterminate lumbar compression fractures. Troponin is flat, but she does have some mild leukocytosis with WBC of 13.0. UA was negative and chest x-ray is clear. No other signs or symptoms of infection " "including cough, fever or recent sick contacts.     At present, she endorses pain in the back of her head, but \"no actual headache,\" as well as significant back pain. She does have back pain at baseline, but states that it is worse than normal. She denies any lightheadedness, dizziness vision changes, chest pain, shortness of breath or nausea.     Plan is to admit Ms. Penrose under observation for q4h neuro checks and pain management. A neurosurgery consult has been placed. Will also check orthostatics, monitor her on telemetry and complete an echocardiogram. PT and OT have been consulted. Pain management will be multimodal with a combination of scheduled Tylenol, PRN oxycodone and Robaxin.     Past Medical History    Past Medical History:   Diagnosis Date    Allergic state     Anemia due to blood loss, acute     Cervical spine fracture (H)     C1 & C2    Chronic back pain     Dementia (H)     Fibromyalgia     Hypertension     IFG (impaired fasting glucose)     Osteopenia     Subdural hematoma (H)     Substance abuse (H)     ETOH    TBI (traumatic brain injury) (H)        Past Surgical History   Past Surgical History:   Procedure Laterality Date    ORTHOPEDIC SURGERY      PHACOEMULSIFICATION CLEAR CORNEA WITH STANDARD INTRAOCULAR LENS IMPLANT Right 2017    Procedure: PHACOEMULSIFICATION CLEAR CORNEA WITH STANDARD INTRAOCULAR LENS IMPLANT;  Surgeon: Brianna Christy MD;  Location: [facility]       Prior to Admission Medications   Prior to Admission Medications   Prescriptions Last Dose Informant Patient Reported? Taking?   Ascorbic Acid (VITAMIN C) 500 MG CHEW   No No   Sig: Take 500 mg by mouth At Bedtime   DEEP SEA NASAL SPRAY 0.65 % nasal spray   Yes No   Sig: INHALE 1-2 SPRAY(S) NASALLY TWICE DAILY AND AS NEEDED. OKAY TO SELF ADMINISTER   SUMAtriptan (IMITREX) 100 MG tablet   No No   Si TAB BY MOUTH AT THE ONSET OF THE HEADACHE - MAY REPEAT IN 2 HOURS IF NEEDED - MAY NOT BE USED NO MORE THAN 4 TIMES " WEEKLY   VITAMIN B-1 100 MG tablet   No No   Sig: TAKE 1 TAB BY MOUTH ONCE DAILY   acetaminophen (TYLENOL) 325 MG tablet   No No   Sig: Take 1-2 tablets (325-650 mg) by mouth every 6 hours as needed for mild pain   azelastine (ASTELIN) 0.1 % nasal spray   No No   Sig: INHALE 2 SPRAYS INTO EACH NOSTRIL ONCE DAILY   cyclobenzaprine (FLEXERIL) 5 MG tablet   No No   Sig: Take 1 tablet (5 mg) by mouth 3 times daily as needed for muscle spasms   divalproex sodium delayed-release (DEPAKOTE) 250 MG DR tablet   Yes No   Sig: Take 250 mg by mouth 2 times daily   fluticasone (FLONASE) 50 MCG/ACT nasal spray   No No   Sig: INHALE 1-2 SPRAYS INTO BOTH NOSTRILS ONCE DAILY   fluticasone-salmeterol (ADVAIR) 250-50 MCG/DOSE inhaler   Yes No   Sig: USE 1 INHALATION BY MOUTH TWICE DAILY. RINSE MOUTH AFTER EACH USE   folic acid (FOLVITE) 1 MG tablet   No No   Sig: TAKE 1 TAB BY MOUTH ONCE DAILY   losartan (COZAAR) 100 MG tablet   No No   Sig: TAKE 1 TAB BY MOUTH ONCE DAILY   meclizine (ANTIVERT) 25 MG tablet   No No   Sig: Take 1 tablet (25 mg) by mouth 3 times daily as needed for dizziness   metoprolol tartrate (LOPRESSOR) 25 MG tablet   No No   Sig: TAKE 1 TAB BY MOUTH TWICE A DAY   mirtazapine (REMERON) 15 MG tablet   No No   Sig: TAKE 1 TAB BY MOUTH AT BEDTIME   mometasone (NASONEX) 50 MCG/ACT spray   No No   Sig: Spray 2 sprays into both nostrils At Bedtime   montelukast (SINGULAIR) 10 MG tablet   No No   Sig: TAKE 1 TAB BY MOUTH AT BEDTIME   omeprazole (PRILOSEC) 20 MG CR capsule   No No   Sig: TAKE 1 CAP BY MOUTH ONCE DAILY   topiramate (TOPAMAX) 25 MG tablet   No No   Sig: TAKE 1 TAB BY MOUTH TWICE A DAY      Facility-Administered Medications: None      Physical Exam   Vital Signs: Temp: 98.1  F (36.7  C) Temp src: Temporal BP: 136/74 Pulse: 89   Resp: 18 SpO2: 95 %      Weight: 0 lbs 0 oz  Physical Exam  Vitals reviewed.   Constitutional:       General: She is not in acute distress.  Cardiovascular:      Rate and Rhythm:  Normal rate and regular rhythm.      Pulses: Normal pulses.      Heart sounds: Normal heart sounds.   Pulmonary:      Effort: Pulmonary effort is normal.      Breath sounds: Normal breath sounds and air entry.   Abdominal:      General: Bowel sounds are normal.      Palpations: Abdomen is soft.      Tenderness: There is no abdominal tenderness.   Musculoskeletal:      Thoracic back: Tenderness present.   Skin:     General: Skin is warm and dry.   Neurological:      Mental Status: She is alert and oriented to person, place, and time. Mental status is at baseline.   Psychiatric:         Attention and Perception: Attention normal.         Mood and Affect: Mood normal.         Speech: Speech normal.         Behavior: Behavior normal. Behavior is cooperative.         Thought Content: Thought content normal.         Cognition and Memory: Cognition normal.         Judgment: Judgment normal.     Medical Decision Making   75 MINUTES SPENT BY ME on the date of service doing chart review, history, exam, documentation & further activities per the note.      Data     I have personally reviewed the following data over the past 24 hrs:    13.0 (H)  \   12.5   / 390     132 (L) 99 14.7 /  112 (H)   4.0 22 0.79 \     Trop: 29 (H) BNP: N/A       Imaging results reviewed over the past 24 hrs:   Recent Results (from the past 24 hours)   CT Lumbar Spine w/o Contrast    Narrative    EXAM: CT LUMBAR SPINE W/O CONTRAST  LOCATION: [facility]  DATE: 6/7/2025    INDICATION: fall, back pain  COMPARISON: 10/14/2023  TECHNIQUE: Routine CT Lumbar Spine without IV contrast. Multiplanar reformats. Dose reduction techniques were used.    FINDINGS:  Nomenclature is based on 5 lumbar type vertebral bodies.   Acute inferior endplate L4 compression fracture with 15% vertebral body height loss. Age-indeterminate L1 compression fracture with greater than 75% vertebral body height loss anteriorly.   Age-indeterminate superior  plate L5 compression fracture with less than 20% vertebral body height loss. Anterolisthesis of L4 and L5 measuring 2 mm. The vertebral bodies of the lumbar spine otherwise have normal stature and alignment. Intramuscular    hematoma within the left medial psoas muscle measuring 2.9 x 1.5 x 2.4 cm (AP x TV x CC).   Unremarkable visualized bony pelvis.    T12-L1: Normal disc height. No herniation. Normal facets. No spinal canal or neural foraminal stenosis.    L1-L2: Normal disc height. No herniation. Normal facets. No spinal canal or neural foraminal stenosis.    L2-L3: Normal disc height. No herniation. Normal facets. No spinal canal or neural foraminal stenosis.    L3-L4: Normal disc height. No herniation. Normal facets. No spinal canal or neural foraminal stenosis.    L4-L5: Normal disc height. No herniation. Normal facets. No spinal canal or neural foraminal stenosis.    L5-S1: Normal disc height. No herniation. Normal facets. No spinal canal or neural foraminal stenosis.      Impression    IMPRESSION:  1.  Acute inferior endplate L4 compression fracture with 15% vertebral body height loss.   2.  Age-indeterminate L1 compression fracture with greater than 75% vertebral body height loss anteriorly.   3.  Age-indeterminate superior plate L5 compression fracture with less than 20% vertebral body height loss.  4.  Intramuscular  hematoma within the left medial psoas muscle measuring 2.9 x 1.5 x 2.4 cm (AP x TV x CC).      CT Cervical Spine w/o Contrast    Narrative    EXAM: CT CERVICAL SPINE W/O CONTRAST  LOCATION: [facility]  DATE: 6/7/2025    INDICATION: fall, struck head  COMPARISON: None.  TECHNIQUE: Routine CT Cervical Spine without IV contrast. Multiplanar reformats. Dose reduction techniques were used.    FINDINGS:  No evidence of acute fracture or subluxation of the cervical spine by CT imaging. Postsurgical changes posterior fixation of a remote C2 dens fracture. Anterolisthesis of  C3 on C4 measuring 2 mm. The vertebral bodies of the cervical spine otherwise have   normal stature and alignment. The disc spaces are well-maintained. No significant degenerative changes. No extraspinal abnormality.     Craniovertebral junction and C1-C2: Normal.    C2-C3: No posterior disc bulge or spinal canal narrowing. No neural foraminal narrowing.     C3-C4: No posterior disc bulge or spinal canal narrowing. Uncovertebral joint disease and facet arthropathy with severe bilateral neural foraminal narrowing.     C4-C5: No posterior disc bulge or spinal canal narrowing. Uncovertebral joint disease and facet arthropathy with severe bilateral neural foraminal narrowing.     C5-C6: No posterior disc bulge or spinal canal narrowing. Uncovertebral joint disease and facet arthropathy with severe bilateral neural foraminal narrowing.     C6-C7: No posterior disc bulge or spinal canal narrowing. No neural foraminal.     C7-T1: No posterior disc bulge or spinal canal narrowing. No neural foraminal.       Impression    IMPRESSION:  1.  No evidence of acute fracture or subluxation of the cervical spine by CT imaging.  2.  Postsurgical changes posterior fixation of a remote C2 dens fracture.    Head CT w/o contrast    Narrative    EXAM: CT HEAD W/O CONTRAST  LOCATION: [facility]  DATE: 6/7/2025    INDICATION: fall, struck head  COMPARISON: None.  TECHNIQUE: Routine CT Head without IV contrast. Multiplanar reformats. Dose reduction techniques were used.    FINDINGS:  INTRACRANIAL CONTENTS: No evidence of acute intracranial hemorrhage or mass effect. Scattered foci of decreased attenuation within the cerebral hemispheric white matter which are nonspecific, though most commonly ascribed to chronic small vessel ischemic   disease. The ventricles and sulci are prominent consistent with mild brain parenchymal volume loss. Gray-white matter differentiation is maintained. The presumed colonization material  within the left jugular bulb.    VISUALIZED ORBITS/SINUSES/MASTOIDS: The globes are unremarkable. The partially imaged paranasal sinuses, mastoid air cells and middle ear cavities are unremarkable.      BONES/SOFT TISSUES: The visualized skull base and calvarium are unremarkable.      Impression    IMPRESSION:    1.  No evidence of acute intracranial hemorrhage or mass effect.   XR Chest 2 Views    Narrative    EXAM: XR CHEST 2 VIEWS  LOCATION: [facility]  DATE: 6/7/2025    INDICATION: generalized weakness, cough, rule out pneumonia  COMPARISON: 3/16/2020      Impression    IMPRESSION: Minimal elevation of the right hemidiaphragm. Patchy left basilar atelectasis or scarring. No effusions or pneumothorax. Mild bronchial wall thickening. Heart size is normal. Right rotator cuff repair changes.

## 2025-06-09 ENCOUNTER — APPOINTMENT (OUTPATIENT)
Dept: CT IMAGING | Facility: CLINIC | Age: 75
End: 2025-06-09
Attending: RADIOLOGY
Payer: COMMERCIAL

## 2025-06-09 LAB
BACTERIA SPEC CULT: NORMAL
GRAM STAIN RESULT: NORMAL
GRAM STAIN RESULT: NORMAL

## 2025-06-09 PROCEDURE — 250N000013 HC RX MED GY IP 250 OP 250 PS 637

## 2025-06-09 PROCEDURE — 87205 SMEAR GRAM STAIN: CPT | Performed by: RADIOLOGY

## 2025-06-09 PROCEDURE — 250N000013 HC RX MED GY IP 250 OP 250 PS 637: Performed by: STUDENT IN AN ORGANIZED HEALTH CARE EDUCATION/TRAINING PROGRAM

## 2025-06-09 PROCEDURE — 99233 SBSQ HOSP IP/OBS HIGH 50: CPT

## 2025-06-09 PROCEDURE — 99222 1ST HOSP IP/OBS MODERATE 55: CPT | Performed by: SPECIALIST

## 2025-06-09 PROCEDURE — 99152 MOD SED SAME PHYS/QHP 5/>YRS: CPT

## 2025-06-09 PROCEDURE — 272N000431 CT RETROPERITONEAL ABSCESS ASPIRATION

## 2025-06-09 PROCEDURE — 87075 CULTR BACTERIA EXCEPT BLOOD: CPT | Performed by: RADIOLOGY

## 2025-06-09 PROCEDURE — 999N000154 HC STATISTIC RADIOLOGY XRAY, US, CT, MAR, NM

## 2025-06-09 PROCEDURE — 250N000009 HC RX 250: Performed by: RADIOLOGY

## 2025-06-09 PROCEDURE — 120N000001 HC R&B MED SURG/OB

## 2025-06-09 PROCEDURE — 250N000011 HC RX IP 250 OP 636: Mod: JZ | Performed by: RADIOLOGY

## 2025-06-09 PROCEDURE — 0K9P3ZZ DRAINAGE OF LEFT HIP MUSCLE, PERCUTANEOUS APPROACH: ICD-10-PCS | Performed by: RADIOLOGY

## 2025-06-09 PROCEDURE — 87070 CULTURE OTHR SPECIMN AEROBIC: CPT | Performed by: RADIOLOGY

## 2025-06-09 RX ORDER — NALOXONE HYDROCHLORIDE 0.4 MG/ML
0.2 INJECTION, SOLUTION INTRAMUSCULAR; INTRAVENOUS; SUBCUTANEOUS
Status: DISCONTINUED | OUTPATIENT
Start: 2025-06-09 | End: 2025-06-10

## 2025-06-09 RX ORDER — LIDOCAINE 4 G/G
1 PATCH TOPICAL
Status: DISCONTINUED | OUTPATIENT
Start: 2025-06-09 | End: 2025-06-11 | Stop reason: HOSPADM

## 2025-06-09 RX ORDER — NALOXONE HYDROCHLORIDE 0.4 MG/ML
0.4 INJECTION, SOLUTION INTRAMUSCULAR; INTRAVENOUS; SUBCUTANEOUS
Status: DISCONTINUED | OUTPATIENT
Start: 2025-06-09 | End: 2025-06-10

## 2025-06-09 RX ORDER — FENTANYL CITRATE 50 UG/ML
25-50 INJECTION, SOLUTION INTRAMUSCULAR; INTRAVENOUS EVERY 5 MIN PRN
Refills: 0 | Status: DISCONTINUED | OUTPATIENT
Start: 2025-06-09 | End: 2025-06-10

## 2025-06-09 RX ORDER — FLUMAZENIL 0.1 MG/ML
0.2 INJECTION, SOLUTION INTRAVENOUS
Status: DISCONTINUED | OUTPATIENT
Start: 2025-06-09 | End: 2025-06-10

## 2025-06-09 RX ORDER — LIDOCAINE HYDROCHLORIDE 10 MG/ML
10 INJECTION, SOLUTION EPIDURAL; INFILTRATION; INTRACAUDAL; PERINEURAL ONCE
Status: COMPLETED | OUTPATIENT
Start: 2025-06-09 | End: 2025-06-09

## 2025-06-09 RX ADMIN — ACETAMINOPHEN 975 MG: 325 TABLET, FILM COATED ORAL at 17:33

## 2025-06-09 RX ADMIN — ACETAMINOPHEN 975 MG: 325 TABLET, FILM COATED ORAL at 08:37

## 2025-06-09 RX ADMIN — POTASSIUM CHLORIDE 20 MEQ: 750 TABLET, EXTENDED RELEASE ORAL at 08:37

## 2025-06-09 RX ADMIN — FENTANYL CITRATE 50 MCG: 50 INJECTION, SOLUTION INTRAMUSCULAR; INTRAVENOUS at 14:21

## 2025-06-09 RX ADMIN — TOPIRAMATE 75 MG: 50 TABLET, FILM COATED ORAL at 08:37

## 2025-06-09 RX ADMIN — ACETAMINOPHEN 975 MG: 325 TABLET, FILM COATED ORAL at 20:58

## 2025-06-09 RX ADMIN — UMECLIDINIUM 1 PUFF: 62.5 AEROSOL, POWDER ORAL at 08:37

## 2025-06-09 RX ADMIN — MONTELUKAST 10 MG: 10 TABLET, FILM COATED ORAL at 22:55

## 2025-06-09 RX ADMIN — METOPROLOL TARTRATE 37.5 MG: 25 TABLET, FILM COATED ORAL at 20:54

## 2025-06-09 RX ADMIN — LEVOTHYROXINE SODIUM 100 MCG: 100 TABLET ORAL at 08:37

## 2025-06-09 RX ADMIN — TOPIRAMATE 75 MG: 50 TABLET, FILM COATED ORAL at 20:54

## 2025-06-09 RX ADMIN — METOPROLOL TARTRATE 37.5 MG: 25 TABLET, FILM COATED ORAL at 08:37

## 2025-06-09 RX ADMIN — OXYCODONE HYDROCHLORIDE 5 MG: 5 TABLET ORAL at 08:36

## 2025-06-09 RX ADMIN — PANTOPRAZOLE SODIUM 40 MG: 40 TABLET, DELAYED RELEASE ORAL at 08:37

## 2025-06-09 RX ADMIN — LIDOCAINE 1 PATCH: 4 PATCH TOPICAL at 12:58

## 2025-06-09 RX ADMIN — LIDOCAINE HYDROCHLORIDE 30 ML: 10 INJECTION, SOLUTION EPIDURAL; INFILTRATION; INTRACAUDAL; PERINEURAL at 14:33

## 2025-06-09 ASSESSMENT — ACTIVITIES OF DAILY LIVING (ADL)
ADLS_ACUITY_SCORE: 59
ADLS_ACUITY_SCORE: 58
ADLS_ACUITY_SCORE: 42
ADLS_ACUITY_SCORE: 42
ADLS_ACUITY_SCORE: 58
ADLS_ACUITY_SCORE: 42
ADLS_ACUITY_SCORE: 59
ADLS_ACUITY_SCORE: 42
ADLS_ACUITY_SCORE: 59
ADLS_ACUITY_SCORE: 42
ADLS_ACUITY_SCORE: 58
ADLS_ACUITY_SCORE: 59
ADLS_ACUITY_SCORE: 58
ADLS_ACUITY_SCORE: 42
ADLS_ACUITY_SCORE: 42
ADLS_ACUITY_SCORE: 59
ADLS_ACUITY_SCORE: 42
ADLS_ACUITY_SCORE: 59
ADLS_ACUITY_SCORE: 59
ADLS_ACUITY_SCORE: 42
ADLS_ACUITY_SCORE: 59

## 2025-06-09 NOTE — PLAN OF CARE
"DATE & SHIFT: 6/8/25 3591-2562  PRIMARY Concern: Fall/Weakness/ L4 compression fx  SAFETY RISK Concerns (fall risk, behaviors, etc.): Fall Risk      Aggression Tool Color: Green-  Isolation/Type: n/a  Tests/Procedures for NEXT shift: IR for spinal fluid collection 6/925  Consults? (Pending/following, signed-off?) IR consult, NRSGY consult- seen, ID consult  Where is patient from? (Home, TCU, etc.): Memory care  Other Important info for NEXT shift: Encouraged patient to mobilize/reposition- pt refusing stating \"do not do this at this time, and don't wake me up\". Also refused VS.  Anticipated DC date & active delays: TBD    SUMMARY NOTE:  Orientation/Cognitive: A&Ox3- situation- forgetful  Observation Goals (Met/ Not Met): Inpatient  Mobility Level/Assist Equipment: A-2 Lift - not out of bed  Antibiotics & Plan (IV/po, length of tx left): n/a  Pain Management: Scheduled tylenol/ PRN oxycodone available  Complete Pain Reassessment: Y/N N Due next: Next shift  Tele/VS/O2: VSS on RA  ABNL Lab/BG: Na- 132, CRP- 46.31  Diet: Reg  Bowel/Bladder: Incont. B&B  Skin Concerns: noted redness to sacrum/buttocks and abd folds  Drains/Devices: IV SL  Patient Stated Goal for Today:  \"to sleep\"  "

## 2025-06-09 NOTE — PROGRESS NOTES
[facility]  Neurosurgery Progress Note  Date of Admission: 6/7/2025   Date of Service: 06/09/2025    Assessment and Plan:  Melissa Penrose is 75 yo female with PMH of alcohol abuse, Alzheimer's dementia and residing in a memory care assisted living facility, history of fall and vertebral compression fracture, history of subdural hematoma, hypertension, hypothyroidism who presented on 6/7/25 after a fall. She endorsed low back pain radiating to left thigh. Lumbar MRI revealed left psoas abscess, possible L4-L5 osteomyelitis/diskitis and chronic T12 and L1 compression fractures for which NSGY was consulted. ID was consulted.     - No surgical intervention required at this time. Agree with IR consult  - Consider pain team consult as pt is reporting uncontrolled pain. Added lidocaine patch   - Trend inflammatory markers  - Delirium precautions   - Activity: up ad claudia with therapies. No bracing required for chronic fx. Could use abdominal binder if that would help with pain.   - Imaging: none needed   - Abx: per ID  - Neuro checks: Q4H  - Okay for regular diet from NS standpoint   - Incentive spirometer   - PT/OT  - DVT ppx: SCDs, okay for lovenox from NSGY standpoint  - Appreciate assistance from other teams with medical management.   - Dispo: Pending adequate pain control and therapy recommendations  - Follow up: TBD    Plans discussed with Dr. Serna who was in agreement with plans.     Carlene Pizarro PA-C   [facility] Neurosurgery  Clinic phone number: [phone]  Securely message or page via [employer], or [employer]     - - - - -     Subjective: Pt endorses 10/10 LBP that radiates down to L thigh. Saying her legs have continued to be generally weak and she has not tried getting out of bed. Denies numbness or tingling in distal extremities, fever or chills.     Objective:   Imaging:   Results for orders placed or performed during the hospital encounter of 06/07/25   Head  CT w/o contrast    Impression    IMPRESSION:    1.  No evidence of acute intracranial hemorrhage or mass effect.   CT Cervical Spine w/o Contrast    Impression    IMPRESSION:  1.  No evidence of acute fracture or subluxation of the cervical spine by CT imaging.  2.  Postsurgical changes posterior fixation of a remote C2 dens fracture.    CT Lumbar Spine w/o Contrast    Impression    IMPRESSION:  1.  Acute inferior endplate L4 compression fracture with 15% vertebral body height loss.   2.  Age-indeterminate L1 compression fracture with greater than 75% vertebral body height loss anteriorly.   3.  Age-indeterminate superior plate L5 compression fracture with less than 20% vertebral body height loss.  4.  Intramuscular  hematoma within the left medial psoas muscle measuring 2.9 x 1.5 x 2.4 cm (AP x TV x CC).      XR Chest 2 Views    Impression    IMPRESSION: Minimal elevation of the right hemidiaphragm. Patchy left basilar atelectasis or scarring. No effusions or pneumothorax. Mild bronchial wall thickening. Heart size is normal. Right rotator cuff repair changes.   MR Lumbar Spine w/o & w Contrast    Impression    IMPRESSION:  1.  L4-5 disc and endplate changes with adjacent paraspinous thickening and enhancement. Left psoas fluid collection. 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 Other possibilities include osteoporotic   fractures or less likely bone metastases.  2.  Chronic T12 and L1 fractures.  3.  Lumbar spondylosis.    4.  Findings discussed with MANE Paz of the neurosurgery service on matter now     Echocardiogram Complete   Result Value Ref Range    LVEF  55-60%      Labs:   - Hgb: 12.5  - Plts: 375  - WBC: 10.4  - CRP: 46    Vitals:    06/07/25 2119 06/09/25 0029   Weight: 119.2 kg (262 lb 12.6 oz) 122.6 kg (270 lb 4.5 oz)     Vital Signs with Ranges  Temp:  [98.4  F (36.9  C)-98.8  F (37.1  C)] 98.4  F (36.9  C)  Pulse:  [80] 80  Resp:  [16-18]  16  BP: (126-128)/(61-73) 128/61  SpO2:  [98 %] 98 %  I/O last 3 completed shifts:  In: -   Out: 800 [Urine:800]    Physical Exam:   General: NAD, sitting up in bed eating breakfast  HENNT: atraumatic, normocephalic.  Mental status:  AOx4, speech is fluent, following commands  Cranial nerves:  II-XII grossly intact.   Motor:  Moves all extremities independently.   Strength:   Hip flexion                Right: 5/5  Left:  5/5  Knee extension         Right:  5/5  Left:  5/5  Knee flexion              Right:  5/5  Left:  5/5  Gastroc Soleus (PF) Right:  5/5  Left:  5/5  Tibialis Ant (DF)        Right:  5/5  Left:  5/5  Sensation: grossly Intact to light touch in lower extremities

## 2025-06-09 NOTE — PROGRESS NOTES
[facility]    Medicine Progress Note - Hospitalist Service    Date of Admission:  6/7/2025    Assessment & Plan   78-year-old female with history of alcohol abuse, Alzheimer's dementia and residing in a memory care assisted living facility, history of fall and vertebral compression fracture, history of subdural hematoma, hypertension, hypothyroidism who presented after an unwitnessed fall and was found to have an L4 acute compression fracture.    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  Remains at ongoing fall risk in setting of osteoporosis and progressively declining executive function congruent to dementia. Given continued low back pain that radiated down to left thigh a MRI was completed showing left psoas abscess.   -Changed to inpatient status 6/8  -NPO for planned IR aspiration of left psoas abscess   -ID consulted will off abx for now   -Blood cultures; no growth to date   -Repeat basic labs 6/10  -Neurosurgery input appreciated. Plan for non-surgical approach, no bracing required. Could use abdominal binder if that would help pain.   -Neuro checks q shift and okay to defer overnight to promote sleep   -Echo completed  given unwitnessed fall shows EF 55-60%, no significant valvular abnormalities.   -Tele with dysthymia; will discontinue   -PT and OT (historically has refused PT evals in the past)  -Up with assist and fall precautions   -Care coordination and social work    Alzheimer's dementia  H/o subdural hematoma, traumatic brain injury  Noted.  -hoping to return to memory care unit    Alcohol abuse history  Noted.    Chronic, stable medical issues:  Hypothyroidism: continue levothyroxine  Migraine headaches: Continue Topamax  Hypertension: Continue  "metoprolol          Diet: NPO for Procedure/Surgery per Anesthesia Guidelines Except for: Meds; Clear liquids before procedure/surgery: ADULT (Age GREATER than or Equal to 18 years) - Clear liquids 2 hours before procedure/surgery    DVT Prophylaxis: Pneumatic Compression Devices  Hernadez Catheter: Not present  Lines: None     Cardiac Monitoring: None  Code Status: Full Code      Clinically Significant Risk Factors Present on Admission         # Hyponatremia: Lowest Na = 132 mmol/L in last 2 days, will monitor as appropriate           # Hypertension: Noted on problem list           # Obesity: Estimated body mass index is 38.78 kg/m  as calculated from the following:    Height as of this encounter: 1.778 m (5' 10\").    Weight as of this encounter: 122.6 kg (270 lb 4.5 oz).              Social Drivers of Health    Tobacco Use: Medium Risk (4/19/2021)    Patient History     Smoking Tobacco Use: Former     Smokeless Tobacco Use: Never          Disposition Plan     Medically Ready for Discharge: Anticipated Tomorrow           The patient's care was discussed with the Attending Physician, Dr. Fox.    Nadia Steve NP  Hospitalist Service  [facility]  Securely message with [employer] (more info)  Text page via [facility] Paging/Directory   ______________________________________________________________________    Interval History   No acute events overnight. Denies pain with ice packs in place to left sided back. Tolerating diet. No fevers or chills. Neuro's intact. Has not been OOB yet. Pt's friend Reena (POA) was updated on the days events.     Physical Exam   Vital Signs: Temp: 98.1  F (36.7  C) Temp src: Oral BP: (!) 141/72 Pulse: 90   Resp: 16 SpO2: 93 % O2 Device: None (Room air)    Weight: 270 lbs 4.54 oz    Physical Exam  Constitutional:       Appearance: She is not ill-appearing or diaphoretic.   HENT:      Mouth/Throat:      Mouth: Mucous membranes are moist.   Cardiovascular:      Rate and " Rhythm: Normal rate and regular rhythm.      Pulses: Normal pulses.      Heart sounds: Normal heart sounds.   Pulmonary:      Effort: Pulmonary effort is normal.      Breath sounds: Normal breath sounds.   Abdominal:      General: Bowel sounds are normal.      Palpations: Abdomen is soft.   Skin:     General: Skin is warm and dry.      Capillary Refill: Capillary refill takes less than 2 seconds.   Neurological:      Mental Status: She is alert. Mental status is at baseline. She is disoriented.   Psychiatric:         Mood and Affect: Mood normal.         Behavior: Behavior normal.           Medical Decision Making       50 MINUTES SPENT BY ME on the date of service doing chart review, history, exam, documentation & further activities per the note.      Data     I have personally reviewed the following data over the past 24 hrs:    Procal: N/A CRP: N/A Lactic Acid: N/A         Imaging results reviewed over the past 24 hrs:   Recent Results (from the past 24 hours)   Echocardiogram Complete   Result Value    LVEF  55-60%    Narrative    [id number]  [id number]  [id number]  [id number]^BENITO^JULIA^MALLY     [facility]  Echocardiography Laboratory  [address] [place] [address]     Name: PENROSE, MELISSA JEAN  MRN: [id number]  : 1950  Study Date: 2025 01:12 PM  Age: 74 yrs  Gender: Female  Patient Location: [facility]  Reason For Study: Syncope and Collapse  Ordering Physician: JULIA OLIVER  Performed By: Richie Bearden     BSA: 2.3 m2  Height: 70 in  Weight: 262 lb  ______________________________________________________________________________  Procedure  Echocardiogram with two-dimensional, color and spectral Doppler. Definity (NDC  #25139-032) given intravenously.  ______________________________________________________________________________  Interpretation Summary     Left ventricular systolic function is normal.  The visual ejection fraction is 55-60%.  The right  ventricular systolic function is normal.  No hemodynamically significant valvular abnormalities on 2D or color flow  imaging. Technically difficult, suboptimal study.  ______________________________________________________________________________  Left Ventricle  The left ventricle is normal in size. Grade I or early diastolic dysfunction.  Left ventricular systolic function is normal. The visual ejection fraction is  55-60%. No regional wall motion abnormalities noted.     Right Ventricle  The right ventricle is not well visualized. The right ventricular systolic  function is normal.     Atria  Normal left atrial size. Right atrial size is normal.     Mitral Valve  The mitral valve is normal in structure and function. There is trace mitral  regurgitation. There is no mitral valve stenosis.     Tricuspid Valve  The tricuspid valve is not well visualized. Right ventricular systolic  pressure could not be approximated due to inadequate tricuspid regurgitation.     Aortic Valve  The aortic valve is not well visualized. No aortic regurgitation is present.  No hemodynamically significant valvular aortic stenosis.     Pulmonic Valve  The pulmonic valve is not well visualized.     Vessels  Normal size aorta. Inferior vena cava not well visualized for estimation of  right atrial pressure. The inferior vena cava is normal.     Pericardium  The pericardium is not well visualized.  ______________________________________________________________________________  MMode/2D Measurements & Calculations  IVSd: 0.90 cm     LVIDd: 4.8 cm  LVIDs: 3.0 cm  LVPWd: 0.90 cm  FS: 37.5 %  LV mass(C)d: 147.8 grams  LV mass(C)dI: 63.1 grams/m2  Ao root diam: 3.1 cm  asc Aorta Diam: 3.3 cm  LVOT diam: 2.2 cm  LVOT area: 3.8 cm2  Ao root diam index Ht(cm/m): 1.7  Ao root diam index BSA (cm/m2): 1.3  Asc Ao diam index BSA (cm/m2): 1.4  Asc Ao diam index Ht(cm/m): 1.9  LA Volume (BP): 52.8 ml     LA Volume Index (BP): 22.6 ml/m2  RWT: 0.37  TAPSE:  2.8 cm     Doppler Measurements & Calculations  MV E max betty: 51.4 cm/sec  MV A max betty: 81.8 cm/sec  MV E/A: 0.63  MV dec slope: 215.0 cm/sec2  MV dec time: 0.24 sec  E/E' av.1  Lateral E/e': 6.4  Medial E/e': 7.9     ______________________________________________________________________________  Report approved by: Marlee Ac MD on 2025 02:55 PM

## 2025-06-09 NOTE — CONSULTS
IR consult received. MRI reviewed. Small left psoas fluid collection, possibly abscess. The collection appears too small to hold a drain but will plan on aspirating for cultures, with CT guidance. Please keep patient NPO.    Andrew Moore MD

## 2025-06-09 NOTE — CONSULTS
[facility]    Infectious Disease Consultation     Date of Admission:  6/7/2025  Date of Consult (When I saw the patient): 06/09/25    Assessment:  74YF with hypertension, hypothyroidism and migraine headaches, who has been admitted due to weakness and a mechanical fall, with MRI findings suspicious for L4-L5 discitis, and left psoas fluid collection concerning for abscess. Patient had no symptoms prior to the fall and suspect imaging findings may be related to trauma. Low suspicion for infection at this time.    -Traumatic fall  -MRI findings concerning for L4-L5 discitis and left psoas fluid collection  -Hyponatremia  -Prior L1, L5 compression fractures  -Generalized weakness, Alzheimer's demential, history of subdural hematoma and traumatic brain injury, alcohol use history    Recommendations  IR aspirate of psoas fluid collection is planned today. Please send for aerobic and anaerobic culture  Monitor off antibiotics    Alejandra Edward MD    Reason for Consult   Reason for consult: I was asked to evaluate this patient for concern for spinal discitis L4-L5.    Primary Care Physician   [facility]    Chief Complaint   Fall    History is obtained from the patient and medical records    History of Present Illness   Melissa Jean Penrose is a 74 year old female with hypertension, hypothyroidism and migraine headaches, who has been admitted due to weakness and a mechanical fall.    Patient has no history prior to the fall of back pain,f ever or recent bacteremic episodes. She struck her head as well. Admission MRI showed findings suspicious for L4-L5 discitis, and left psoas fluid collection concerning for abscess.    She has no fever or leukocytosis, but has elevated ESR. Patient has been evaluated by IR and a psoas aspirate is planned for culture today    Past Medical History   I have reviewed this patient's medical history and updated it with pertinent information if needed.    Past Medical History:   Diagnosis Date    Allergic state     Anemia due to blood loss, acute     Cervical spine fracture (H) 2015    C1 & C2    Chronic back pain     Dementia (H)     Fibromyalgia     Hypertension     IFG (impaired fasting glucose)     Osteopenia     Subdural hematoma (H) 2015    Substance abuse (H)     ETOH    TBI (traumatic brain injury) (H)        Past Surgical History   I have reviewed this patient's surgical history and updated it with pertinent information if needed.  Past Surgical History:   Procedure Laterality Date    ORTHOPEDIC SURGERY      PHACOEMULSIFICATION CLEAR CORNEA WITH STANDARD INTRAOCULAR LENS IMPLANT Right 2/7/2017    Procedure: PHACOEMULSIFICATION CLEAR CORNEA WITH STANDARD INTRAOCULAR LENS IMPLANT;  Surgeon: Brianna Christy MD;  Location: [facility]       Prior to Admission Medications   Prior to Admission Medications   Prescriptions Last Dose Informant Patient Reported? Taking?   DEEP SEA NASAL SPRAY 0.65 % nasal spray   Yes Yes   Sig: Spray 1-2 sprays in nostril 2 times daily. And daily as needed.   Dextromethorphan-guaiFENesin  MG/5ML syrup   Yes Yes   Sig: Take 10 mLs by mouth every 4 hours as needed for cough (and or congestion).   VITAMIN B-1 100 MG tablet   No Yes   Sig: TAKE 1 TAB BY MOUTH ONCE DAILY   acetaminophen (TYLENOL) 325 MG tablet   Yes Yes   Sig: Take 650 mg by mouth every 6 hours as needed for mild pain.   ammonium lactate (AMLACTIN) 12 % external cream   Yes Yes   Sig: Apply topically 2 times daily. For itchy skin   ascorbic acid (VITAMIN C) 500 MG CPCR CR capsule   Yes Yes   Sig: Take 500 mg by mouth daily.   azelastine (ASTELIN) 0.1 % nasal spray   Yes Yes   Sig: Spray 1 spray into both nostrils daily.   flurbiprofen (ANSAID) 100 MG tablet   Yes Yes   Sig: Take 100 mg by mouth every 8 hours as needed (Pain).   fluticasone (FLONASE) 50 MCG/ACT nasal spray   No Yes   Sig: INHALE 1-2 SPRAYS INTO BOTH NOSTRILS ONCE DAILY   folic acid (FOLVITE) 1 MG  tablet   No Yes   Sig: TAKE 1 TAB BY MOUTH ONCE DAILY   guaiFENesin (MUCINEX) 600 MG 12 hr tablet   Yes Yes   Sig: Take 600 mg by mouth 2 times daily.   levothyroxine (SYNTHROID/LEVOTHROID) 100 MCG tablet   Yes Yes   Sig: Take 100 mcg by mouth every morning (before breakfast).   metoclopramide (REGLAN) 10 MG tablet   Yes Yes   Sig: Take 10-20 mg by mouth See Admin Instructions. Every 4-6 hours as needed for GERD   metoprolol tartrate (LOPRESSOR) 25 MG tablet   Yes Yes   Sig: Take 37.5 mg by mouth 2 times daily.   montelukast (SINGULAIR) 10 MG tablet   No Yes   Sig: TAKE 1 TAB BY MOUTH AT BEDTIME   omeprazole (PRILOSEC) 20 MG CR capsule   No Yes   Sig: TAKE 1 CAP BY MOUTH ONCE DAILY   potassium chloride ER (K-TAB/KLOR-CON) 10 MEQ CR tablet   Yes Yes   Sig: Take 20 mEq by mouth daily.   rizatriptan (MAXALT) 10 MG tablet   Yes Yes   Sig: Take 10 mg by mouth at onset of headache for migraine. May repeat after 2 hours. Max 2 tabs/day and 10 days/month   sodium fluoride (SODIUM FLUORIDE 5000 PLUS) 1.1 % CREA   Yes Yes   Sig: Apply to affected area daily as needed. Brush on teeth and spit as needed as directed   terbinafine (LAMISIL) 1 % external cream   Yes Yes   Sig: Apply topically 2 times daily. Apply under right breast for rash   tiotropium (SPIRIVA RESPIMAT) 2.5 MCG/ACT inhaler   Yes Yes   Sig: Inhale 2 puffs into the lungs daily.   topiramate (TOPAMAX) 50 MG tablet   Yes Yes   Sig: Take 75 mg by mouth 2 times daily.   traMADol (ULTRAM) 50 MG tablet   Yes Yes   Sig: Take 12.5 mg by mouth 2 times daily.   triamcinolone (KENALOG) 0.1 % external cream   Yes Yes   Sig: Apply topically 4 times daily as needed for irritation.   vitamin D2 (ERGOCALCIFEROL) 28727 units (1250 mcg) capsule   Yes Yes   Sig: Take 50,000 Units by mouth once a week. Takes on Mondays      Facility-Administered Medications: None     Allergies   Allergies   Allergen Reactions    Penicillins Swelling     Reports facial swelling, tolerated ceftriaxone  per Care Everywhere in 10/2014    Tree Nuts  [Nuts] Other (See Comments)     Vomiting, throat closing, digestive issues    Nuts [Peanut-Containing Drug Products] Nausea and Vomiting    Amoxicillin     Morphine     Rofecoxib Unknown and Rash       Immunization History   Immunization History   Administered Date(s) Administered    COVID-19 Bivalent 12+ (Pfizer) 2022    COVID-19 Monovalent 18+ (Moderna) 2021, 2021, 2021, 2022    HEPA 10/02/2013    HepB 10/09/2014    Hepatitis A/B (Twinrix) 2014, 2014, 10/09/2014    Influenza (High Dose) Trivalent,PF (Fluzone) 2016, 10/31/2017    Mantoux Tuberculin Skin Test 2016, 2017    Pneumococcal 23 valent 06/15/2010, 2016    TDAP (Adacel,Boostrix) 2009    Zoster vaccine, live 06/15/2010       Social History   I have reviewed this patient's social history and updated it with pertinent information if needed. Melissa Jean Penrose  reports that she quit smoking about 39 years ago. She has never used smokeless tobacco. She reports current alcohol use of about 2.0 standard drinks of alcohol per week. She reports that she does not use drugs.    Family History   I have reviewed this patient's family history and updated it with pertinent information if needed.   Family History   Problem Relation Age of Onset    Alzheimer Disease Mother 58         71       Review of Systems   The 10 point Review of Systems is as per HPI    Physical Exam   Temp: 98.1  F (36.7  C) Temp src: Oral BP: (!) 141/72 Pulse: 90   Resp: 16 SpO2: 93 % O2 Device: None (Room air)    Vital Signs with Ranges  Temp:  [98.1  F (36.7  C)-98.8  F (37.1  C)] 98.1  F (36.7  C)  Pulse:  [80-90] 90  Resp:  [16-18] 16  BP: (126-141)/(61-73) 141/72  SpO2:  [93 %-98 %] 93 %  270 lbs 4.54 oz  Body mass index is 38.78 kg/m .    GENERAL APPEARANCE:  awake  EYES: Eyes grossly normal to inspection  NECK: no adenopathy  RESP: lungs clear   CV: regular rates and  rhythm  ABDOMEN: soft, nontender  SKIN: no suspicious lesions or rashes    Data   All laboratory data reviewed  Component      Latest Ref Rng 6/8/2025  7:54 AM   Sodium      135 - 145 mmol/L 132 (L)    Potassium      3.4 - 5.3 mmol/L 3.6    Chloride      98 - 107 mmol/L 101    Carbon Dioxide (CO2)      22 - 29 mmol/L 18 (L)    Anion Gap      7 - 15 mmol/L 13    Urea Nitrogen      8.0 - 23.0 mg/dL 12.7    Creatinine      0.51 - 0.95 mg/dL 0.61    GFR Estimate      >60 mL/min/1.73m2 >90    Calcium      8.8 - 10.4 mg/dL 8.9    Glucose      70 - 99 mg/dL 112 (H)    WBC      4.0 - 11.0 10e3/uL 10.4    RBC Count      3.80 - 5.20 10e6/uL 4.35    Hemoglobin      11.7 - 15.7 g/dL 12.5    Hematocrit      35.0 - 47.0 % 36.7    MCV      78 - 100 fL 84    MCH      26.5 - 33.0 pg 28.7    MCHC      31.5 - 36.5 g/dL 34.1    RDW      10.0 - 15.0 % 12.9    Platelet Count      150 - 450 10e3/uL 375    CRP Inflammation      <5.00 mg/L 46.31 (H)       Component      Latest Ref Rng 6/8/2025  3:37 PM   Sed Rate      0 - 30 mm/hr 113 (H)       Microbiology  06/08/2025 1546 06/09/2025 0616 Blood Culture Peripheral blood (BC) Hand, Right [[id number]]    Peripheral blood (BC) from Hand, Right    Preliminary result Component Value   Culture No growth after 12 hours P             06/08/2025 1537 06/09/2025 0616 Blood Culture Peripheral blood (BC) Hand, Left [[id number]]   Peripheral blood (BC) from Hand, Left    Preliminary result Component Value   Culture No growth after 12 hours P        Imaging  EXAM: MR LUMBAR SPINE W/O and W CONTRAST  LOCATION: [facility]  DATE: 6/8/2025     INDICATION: compression fractrures (hx of breast cancer)  COMPARISON: Lumbar spine CT June 7, 2025.  CONTRAST: 11mL erik  TECHNIQUE: Routine Lumbar Spine MRI without and with IV contrast.     FINDINGS:   L5 upper endplate and L4 lower endplate fractures are present with up to 30% height loss at L5 and 20% height loss at L4. No change from  previous. Fluid cleft adjacent to the lower endplate of L4. Patchy edema throughout the vertebral bodies with   associated enhancement. No posterior element involvement. There may be some minimal endplate erosion along the anterior aspect of the lower endplate of L4. Paraspinous thickening and enhancement partially encasing the aortoiliac area and extending into   the medial psoas musculature noted. Left sella is peripherally enhancing fluid collection is seen on axial image 44 of series 108 measuring 17 mm in diameter and 24 mm cephalocaudad. There is a smaller adjacent fluid collection measuring 11 mm in   diameter. Ventral canal linear epidural thickening is nonspecific. No evidence for discrete, drainable epidural fluid collection. Epidural findings could represent phlegmon versus venous vascular engorgement.     Severe T12 upper lower endplate fractures with marked anterior wedging. Minimal residual marrow edema and patchy enhancement. Unremarkable posterior elements.     The other lumbar vertebral body heights appear well-maintained. Lower thoracic vertebral bodies visualized on this study unremarkable.     T10-11: Disc desiccation. Facet DJD. Mild bilateral foraminal stenosis. Patent central canal.     T11-12: Disc desiccation and mild disc bulge. Ventral osteophyte. Patent canal and foramen.      T12-L1: L1 fracture with 6 mm retropulsion of the upper posterior cortex on a chronic basis. Moderate canal stenosis. Patent foramina. Mild facet DJD. Disc desiccation and disc bulge.     L1-L2: Retropulsed bone and degenerative subluxation due to significant degenerative disc change. 8 mm displacement with moderate to severe central canal stenosis. Severe bilateral foraminal stenosis. Mild facet DJD.     L2-L3: Disc osteophyte. Evidence of vacuum disc. Facet DJD. Moderate central canal stenosis. Severe right and moderate left foraminal stenosis. Moderate bilateral lateral recess stenosis.     L3-L4: Broad-based  disc bulge. Facet DJD. Vacuum disc. Moderate central canal stenosis. Moderate bilateral lateral recess stenosis. Severe right and moderate left foraminal stenosis.     L4-L5: Endplate fractures, abnormal T2 signal increase within the disc and patchy enhancement along the endplates. Paraspinous thickening and enhancement as above. Left psoas fluid collection. Prominent epidural enhancement posterior to the L4 vertebral   body and L4-5 disc is nonspecific. Discitis osteomyelitis is a significant concern especially given the reactive change in the adjacent psoas musculature and presence of potential psoas abscess or hematoma. No evidence for discrete, drainable epidural   abscess, however. Other possibilities include osteoporotic fractures or bone metastases but the disc centric location of this process would be somewhat unusual for this. Correlate with any signs or symptoms of infection/inflammation.     L5-S1: Disc osteophyte complex. Moderate moderate central canal stenosis. Facet DJD. Moderate to severe bilateral foraminal stenosis.                                                                      IMPRESSION:  1.  L4-5 disc and endplate changes with adjacent paraspinous thickening and enhancement. Left psoas fluid collection. 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 Other possibilities include osteoporotic   fractures or less likely bone metastases.  2.  Chronic T12 and L1 fractures.  3.  Lumbar spondylosis.

## 2025-06-09 NOTE — PROGRESS NOTES
"DATE & SHIFT: 3186-9333  PRIMARY Concern: Fall/Weakness/ L4 compression fx  SAFETY RISK Concerns (fall risk, behaviors, etc.): Fall Risk      Aggression Tool Color: Green-  Isolation/Type: n/a  Tests/Procedures for NEXT shift: IR for spinal fluid collection 6/925- MRI Lumbar spine completed, ECHO completed  Consults? (Pending/following, signed-off?) IR consult, NRSGY consult- seen, ID consult  Where is patient from? (Home, TCU, etc.): Memory care  Other Important info for NEXT shift: Encouraged patient to mobilize/reposition- pt refusing stating \"not today\". Offered PRN medication for pain management before mobility, pt not agreeable.   Anticipated DC date & active delays: TBD  _____________________________________________________________________________  SUMMARY NOTE:   Orientation/Cognitive: A&Ox3- situation- pt frequently forgetful- repeatedly asking same questions  Observation Goals (Met/ Not Met): Inpatient  Mobility Level/Assist Equipment: A-2 - not out of bed  Antibiotics & Plan (IV/po, length of tx left): n/a  Pain Management: Scheduled tylenol/ PRN oxycodone available  Complete Pain Reassessment: Y/N N Due next: Next shift  Tele/VS/O2: VSS on RA  ABNL Lab/BG: Na- 132, CRP- 46.31,   Diet: Reg  Bowel/Bladder: Incont. B&B  Skin Concerns: noted redness to sacrum/buttocks  Drains/Devices: IV SL  Patient Stated Goal for Today: n/a  "

## 2025-06-09 NOTE — PROGRESS NOTES
"DATE & SHIFT: 6/9/25  [id number]  PRIMARY Concern: Unwitnessed fall/Weakness/ L4 compression fx  SAFETY RISK Concerns (fall risk, behaviors, etc.): Fall Risk      Aggression Tool Color: Green  Isolation/Type: n/a  Tests/Procedures for NEXT shift: Pt went down to CT for Abscess aspiration.  Consults? (Pending/following, signed-off?) NRSGY following. Pt refused PT this shift. ID following, OT consult pending.  Where is patient from? (Home, TCU, etc.): Memory care long term  Other Important info for NEXT shift: Can get irritated easily, uncooperative, refused care and reposition at times. Pt is upset and angry for NPO.   Anticipated DC date & active delays: TBD    SUMMARY NOTE:  Orientation/Cognitive: A&Ox3- situation- forgetful  Observation Goals (Met/ Not Met): Inpatient  Mobility Level/Assist Equipment: A-2 Lift - not out of bed  Antibiotics & Plan (IV/po, length of tx left): n/a  Pain Management: Scheduled tylenol. PRN oxycodone given X1 for back pain 10/10. Lidocaine patch L lower back.  Complete Pain Reassessment: Y/N N Due next: Next shift  Tele/VS/O2: VSS on RA  ABNL Lab/BG: Na- 132, CRP- 46.31  Diet: NPO ex meds  Bowel/Bladder: Incont. B&B. Purewick in place  Skin Concerns: noted redness to sacrum/buttocks and abd folds.   Drains/Devices: IV SL  Patient Stated Goal for Today:  \"to sleep\"  "

## 2025-06-10 ENCOUNTER — APPOINTMENT (OUTPATIENT)
Dept: PHYSICAL THERAPY | Facility: CLINIC | Age: 75
DRG: 372 | End: 2025-06-10
Payer: COMMERCIAL

## 2025-06-10 LAB
ANION GAP SERPL CALCULATED.3IONS-SCNC: 12 MMOL/L (ref 7–15)
BUN SERPL-MCNC: 16.8 MG/DL (ref 8–23)
CALCIUM SERPL-MCNC: 8.9 MG/DL (ref 8.8–10.4)
CHLORIDE SERPL-SCNC: 102 MMOL/L (ref 98–107)
CREAT SERPL-MCNC: 0.74 MG/DL (ref 0.51–0.95)
EGFRCR SERPLBLD CKD-EPI 2021: 84 ML/MIN/1.73M2
ERYTHROCYTE [DISTWIDTH] IN BLOOD BY AUTOMATED COUNT: 13.1 % (ref 10–15)
GLUCOSE SERPL-MCNC: 104 MG/DL (ref 70–99)
HCO3 SERPL-SCNC: 19 MMOL/L (ref 22–29)
HCT VFR BLD AUTO: 37.1 % (ref 35–47)
HGB BLD-MCNC: 12.5 G/DL (ref 11.7–15.7)
MCH RBC QN AUTO: 28.7 PG (ref 26.5–33)
MCHC RBC AUTO-ENTMCNC: 33.7 G/DL (ref 31.5–36.5)
MCV RBC AUTO: 85 FL (ref 78–100)
PLATELET # BLD AUTO: 380 10E3/UL (ref 150–450)
POTASSIUM SERPL-SCNC: 4.2 MMOL/L (ref 3.4–5.3)
RBC # BLD AUTO: 4.35 10E6/UL (ref 3.8–5.2)
SODIUM SERPL-SCNC: 133 MMOL/L (ref 135–145)
WBC # BLD AUTO: 9.6 10E3/UL (ref 4–11)

## 2025-06-10 PROCEDURE — 99231 SBSQ HOSP IP/OBS SF/LOW 25: CPT | Performed by: NURSE PRACTITIONER

## 2025-06-10 PROCEDURE — 97530 THERAPEUTIC ACTIVITIES: CPT | Mod: GP | Performed by: PHYSICAL THERAPIST

## 2025-06-10 PROCEDURE — 250N000013 HC RX MED GY IP 250 OP 250 PS 637: Performed by: STUDENT IN AN ORGANIZED HEALTH CARE EDUCATION/TRAINING PROGRAM

## 2025-06-10 PROCEDURE — 97110 THERAPEUTIC EXERCISES: CPT | Mod: GP | Performed by: PHYSICAL THERAPIST

## 2025-06-10 PROCEDURE — 36415 COLL VENOUS BLD VENIPUNCTURE: CPT

## 2025-06-10 PROCEDURE — 120N000001 HC R&B MED SURG/OB

## 2025-06-10 PROCEDURE — 99233 SBSQ HOSP IP/OBS HIGH 50: CPT

## 2025-06-10 PROCEDURE — 250N000013 HC RX MED GY IP 250 OP 250 PS 637

## 2025-06-10 PROCEDURE — 97161 PT EVAL LOW COMPLEX 20 MIN: CPT | Mod: GP | Performed by: PHYSICAL THERAPIST

## 2025-06-10 PROCEDURE — 85027 COMPLETE CBC AUTOMATED: CPT

## 2025-06-10 PROCEDURE — 80048 BASIC METABOLIC PNL TOTAL CA: CPT

## 2025-06-10 RX ADMIN — METHOCARBAMOL 500 MG: 500 TABLET ORAL at 13:30

## 2025-06-10 RX ADMIN — MICONAZOLE NITRATE: 2 POWDER TOPICAL at 20:47

## 2025-06-10 RX ADMIN — TOPIRAMATE 75 MG: 50 TABLET, FILM COATED ORAL at 20:46

## 2025-06-10 RX ADMIN — METOPROLOL TARTRATE 37.5 MG: 25 TABLET, FILM COATED ORAL at 20:47

## 2025-06-10 RX ADMIN — ACETAMINOPHEN 975 MG: 325 TABLET, FILM COATED ORAL at 09:00

## 2025-06-10 RX ADMIN — ACETAMINOPHEN 975 MG: 325 TABLET, FILM COATED ORAL at 20:47

## 2025-06-10 RX ADMIN — MONTELUKAST 10 MG: 10 TABLET, FILM COATED ORAL at 22:17

## 2025-06-10 RX ADMIN — PANTOPRAZOLE SODIUM 40 MG: 40 TABLET, DELAYED RELEASE ORAL at 07:13

## 2025-06-10 RX ADMIN — METOPROLOL TARTRATE 37.5 MG: 25 TABLET, FILM COATED ORAL at 09:00

## 2025-06-10 RX ADMIN — TOPIRAMATE 75 MG: 50 TABLET, FILM COATED ORAL at 09:00

## 2025-06-10 RX ADMIN — LEVOTHYROXINE SODIUM 100 MCG: 100 TABLET ORAL at 07:13

## 2025-06-10 RX ADMIN — POTASSIUM CHLORIDE 20 MEQ: 750 TABLET, EXTENDED RELEASE ORAL at 09:00

## 2025-06-10 RX ADMIN — OXYCODONE HYDROCHLORIDE 5 MG: 5 TABLET ORAL at 00:57

## 2025-06-10 RX ADMIN — UMECLIDINIUM 1 PUFF: 62.5 AEROSOL, POWDER ORAL at 09:03

## 2025-06-10 RX ADMIN — ACETAMINOPHEN 975 MG: 325 TABLET, FILM COATED ORAL at 13:29

## 2025-06-10 RX ADMIN — LIDOCAINE 1 PATCH: 4 PATCH TOPICAL at 09:00

## 2025-06-10 ASSESSMENT — ACTIVITIES OF DAILY LIVING (ADL)
ADLS_ACUITY_SCORE: 59

## 2025-06-10 NOTE — PROGRESS NOTES
Care Management Follow Up    Length of Stay (days): 2    Expected Discharge Date: 06/11/2025     Concerns to be Addressed: all concerns addressed in this encounter     Patient plan of care discussed at interdisciplinary rounds: Yes    Anticipated Discharge Disposition: Skilled Nursing Facility              Anticipated Discharge Services: None  Anticipated Discharge DME: None    Patient/family educated on Medicare website which has current facility and service quality ratings: yes  Education Provided on the Discharge Plan: Yes  Patient/Family in Agreement with the Plan: yes    Referrals Placed by CM/SW: Post Acute Facilities  Private pay costs discussed: Not applicable    Discussed  Partnership in Safe Discharge Planning  document with patient/family: No     Handoff Completed: No, handoff not indicated or clinically appropriate    Additional Information:  Writer was informed by the provider of patient being medically ready tomorrow.  Writer was advised to contact the patient's friend, Reena, who is the patient's POA.      Writer contacted the patient's POA, Reena ([phone]).  Writer discussed DC recommendation which Reena was in agreement. Reena stated the patient has a history of declining care, however, Reena stated that she has been able to motivate the patient to participate with therapies in the past and patient has made progression.  Reena stated the patient has [facility] and [facility]  Writer stated he will send referrals out to their facility.  Writer stated he will work on insurance authorization tomorrow.  Reena was in agreement.    Writer sent TCU referrals.      Next Steps: Complete insurance Auth and follow-up with facilities tomorrow.    DIMAS Torres  [facility]  Social Work

## 2025-06-10 NOTE — PROGRESS NOTES
Occupational Therapy: Orders received. Chart reviewed and discussed with care team.? Occupational Therapy not indicated due to pt not having any IP OT needs.  Plan is for pt to discharge to TCU, recommending OT eval at TCU.? Defer discharge recommendations to treatment team.? Will complete orders.

## 2025-06-10 NOTE — PROGRESS NOTES
[facility]  Neurosurgery Progress Note  Date of Admission: 6/7/2025   Date of Service: 06/10/2025    Assessment and Plan:  Melissa Penrose is 73 yo female with PMH of alcohol abuse, Alzheimer's dementia and residing in a memory care assisted living facility, history of fall and vertebral compression fracture, history of subdural hematoma, hypertension, hypothyroidism who presented on 6/7/25 after a fall. She endorsed low back pain radiating to left thigh. Lumbar MRI revealed left psoas abscess, possible L4-L5 osteomyelitis/diskitis and chronic T12 and L1 compression fractures for which NSGY was consulted. ID was consulted.     - No surgical intervention required at this time.   - Consider pain team consult   - Trend inflammatory markers  - Delirium precautions   - Activity: up ad claudia with therapies. No bracing required for chronic fx. Could use abdominal binder if that would help with pain.   - Imaging: none needed   - Abx: per ID  - Neuro checks: Q4H  - Okay for regular diet from NS standpoint   - Incentive spirometer   - PT/OT  - DVT ppx: SCDs, okay for lovenox from NSGY standpoint  - Appreciate assistance from other teams with medical management.   - Dispo: Pending adequate pain control and therapy recommendations  - Follow up: follow-up in 6 weeks with upright x-rays, appointment has been arranged.   - Neurosurgery will sign off at this time, please do not hesitate to call with additional questions or concerns.     Plans discussed with Dr. Serna who was in agreement with plans.     DALIA Barber, CNP  Department of Neurosurgery  Pager: [phone]      - - - - -     Subjective: Underwent abscess aspiration yesterday with IR, NGTD thus far.  Patient states back pain is controlled this morning, denies leg pain. Denies numbness or tingling in distal extremities, fever or chills.     Objective:   Imaging:   Results for orders placed or performed during the hospital encounter of  06/07/25   Head CT w/o contrast    Impression    IMPRESSION:    1.  No evidence of acute intracranial hemorrhage or mass effect.   CT Cervical Spine w/o Contrast    Impression    IMPRESSION:  1.  No evidence of acute fracture or subluxation of the cervical spine by CT imaging.  2.  Postsurgical changes posterior fixation of a remote C2 dens fracture.    CT Lumbar Spine w/o Contrast    Impression    IMPRESSION:  1.  Acute inferior endplate L4 compression fracture with 15% vertebral body height loss.   2.  Age-indeterminate L1 compression fracture with greater than 75% vertebral body height loss anteriorly.   3.  Age-indeterminate superior plate L5 compression fracture with less than 20% vertebral body height loss.  4.  Intramuscular  hematoma within the left medial psoas muscle measuring 2.9 x 1.5 x 2.4 cm (AP x TV x CC).      XR Chest 2 Views    Impression    IMPRESSION: Minimal elevation of the right hemidiaphragm. Patchy left basilar atelectasis or scarring. No effusions or pneumothorax. Mild bronchial wall thickening. Heart size is normal. Right rotator cuff repair changes.   MR Lumbar Spine w/o & w Contrast    Impression    IMPRESSION:  1.  L4-5 disc and endplate changes with adjacent paraspinous thickening and enhancement. Left psoas fluid collection. 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 Other possibilities include osteoporotic   fractures or less likely bone metastases.  2.  Chronic T12 and L1 fractures.  3.  Lumbar spondylosis.    4.  Findings discussed with MANE Paz of the neurosurgery service on matter now     Echocardiogram Complete   Result Value Ref Range    LVEF  55-60%      Labs:   - Hgb: 12.5  - Plts: 375  - WBC: 10.4  - CRP: 46    Vitals:    06/07/25 2119 06/09/25 0029 06/10/25 0619   Weight: 119.2 kg (262 lb 12.6 oz) 122.6 kg (270 lb 4.5 oz) 122.4 kg (269 lb 13.5 oz)     Vital Signs with Ranges  Temp:  [97.6  F (36.4  C)-99.1  F (37.3   C)] 99.1  F (37.3  C)  Pulse:  [73-83] 80  Resp:  [16-18] 16  BP: (120-177)/(63-92) 144/78  SpO2:  [93 %-99 %] 95 %  I/O last 3 completed shifts:  In: 180 [P.O.:180]  Out: 1150 [Urine:1150]    Physical Exam:   General: NAD, sitting up in bed eating breakfast  HENNT: atraumatic, normocephalic.  Mental status:  AOx4, speech is fluent, following commands  Cranial nerves:  II-XII grossly intact.   Motor:  Moves all extremities independently.   Strength:   Hip flexion                Right: 5/5  Left:  5/5  Knee extension         Right:  5/5  Left:  5/5  Knee flexion              Right:  5/5  Left:  5/5  Gastroc Soleus (PF) Right:  5/5  Left:  5/5  Tibialis Ant (DF)        Right:  5/5  Left:  5/5  Sensation: grossly Intact to light touch in lower extremities

## 2025-06-10 NOTE — PROGRESS NOTES
06/10/25 1100   Appointment Info   Signing Clinician's Name / Credentials (PT) Gilberto Mullen DPT       Present no   Living Environment   People in Home alone   Current Living Arrangements assisted living   Home Accessibility no concerns   Transportation Anticipated agency   Living Environment Comments Pt lives in [facility]. No stairs. Nursing staff present to assist pt.   Self-Care   Usual Activity Tolerance moderate   Current Activity Tolerance poor   Regular Exercise No   Equipment Currently Used at Home cane, straight;walker, rolling   Fall history within last six months yes   Number of times patient has fallen within last six months 1   Activity/Exercise/Self-Care Comment Pt reports being IND with bathing and dressing, receives assist with other ADLs. Pt ambulates w/ a SEC at baseline.   General Information   Onset of Illness/Injury or Date of Surgery 06/10/25   Referring Physician Luis A Hale NP   Patient/Family Therapy Goals Statement (PT) Pt did not state   Pertinent History of Current Problem (include personal factors and/or comorbidities that impact the POC) Per Chart: 78-year-old female with history of alcohol abuse, Alzheimer's dementia and residing in a memory care assisted living facility, history of fall and vertebral compression fracture, history of subdural hematoma, hypertension, hypothyroidism who presented after an unwitnessed fall and was found to have an L4 acute compression fracture.   Existing Precautions/Restrictions fall   Weight-Bearing Status - LLE full weight-bearing   Weight-Bearing Status - RLE full weight-bearing   Cognition   Orientation Status (Cognition) oriented x 3   Pain Assessment   Patient Currently in Pain Yes, see Vital Sign flowsheet  (reports ongoing back pain)   Posture    Posture Forward head position;Protracted shoulders   Range of Motion (ROM)   Range of Motion ROM is WFL   Strength (Manual Muscle Testing)   Strength  (Manual Muscle Testing) Deficits observed during functional mobility   Strength Comments BLE Hip Flexion: 3/5   Bed Mobility   Comment, (Bed Mobility) Supine>sit w/ mod A x 1   Transfers   Comment, (Transfers) Pt declined to attempt   Balance   Balance Comments Adequate static sitting balance   Sensory Examination   Sensory Perception patient reports no sensory changes   Clinical Impression   Criteria for Skilled Therapeutic Intervention Yes, treatment indicated   PT Diagnosis (PT) Impaired gait   Influenced by the following impairments Increased pain; decreased activity tolerance; decreased balance; decreased strength   Functional limitations due to impairments Impaired functional mobility   Clinical Presentation (PT Evaluation Complexity) stable   Clinical Presentation Rationale Clinical judgement   Clinical Decision Making (Complexity) low complexity   Planned Therapy Interventions (PT) balance training;bed mobility training;gait training;patient/family education;strengthening;transfer training;progressive activity/exercise   Risk & Benefits of therapy have been explained evaluation/treatment results reviewed;care plan/treatment goals reviewed;risks/benefits reviewed;current/potential barriers reviewed;participants voiced agreement with care plan;participants included;patient   PT Total Evaluation Time   PT Eval, Low Complexity Minutes (72356) 10   Physical Therapy Goals   PT Frequency 3x/week   PT Predicted Duration/Target Date for Goal Attainment 06/17/25   PT Goals Bed Mobility;Transfers;Gait   PT: Bed Mobility Supervision/stand-by assist;Supine to/from sit   PT: Transfers Supervision/stand-by assist;Sit to/from stand;Assistive device   PT: Gait Supervision/stand-by assist;Assistive device;50 feet   Interventions   Interventions Quick Adds Therapeutic Activity;Therapeutic Procedure   Therapeutic Procedure/Exercise   Ther. Procedure: strength, endurance, ROM, flexibillity Minutes (05118) 8   Symptoms Noted  During/After Treatment increased pain   Treatment Detail/Skilled Intervention Pt adament about not attempting to stand on this date, willing to attempt sitting exercises. Pt completed the follwing sitting LE exercises x 10 reps to improve strength and functional mobility: heel/toe raises, LAQs, sitting marches and resisted abduction/adduction. Verbal and tactile cues for technique. Pt reporting increased back pain with all activity.   Therapeutic Activity   Therapeutic Activities: dynamic activities to improve functional performance Minutes (27654) 14   Symptoms Noted During/After Treatment Increased pain   Treatment Detail/Skilled Intervention Greeted pt supine in bed, agreed to PT with encouragement. VSS on RA throughout session. Pt moving very slowly, appears not motivated, and asking for frequent rest breaks. Pt performed supine<>sit x 2 w/ mod A x 1, needing assist for trunk control. Pt expressing increased pain with all activity. Once in sitting, pt able to scoot self to EOB and sit unsupported without LOB. Pt able to sit at EOB for ~5 minutes each time before asking to return to supine. Pt adament about not trying to stand despite encouragement. Pt returned to supine w/ min A x 1, needing assist to safely lift BLEs back into bed and reposition self. Pt ended session supine in bed, with all needs met and call light within reach.   PT Discharge Planning   PT Plan Bed mobility; sit>stands; pre-gait exercises; initiate gait w/ FWW and WC follow as able   PT Discharge Recommendation (DC Rec) Transitional Care Facility   PT Rationale for DC Rec Pt is below baseline. Pt currently requiring assist with all functional mobility. Pt presents with deficits in activity tolerance, balance, strength, and increased pain. Due to these deficits, pt declines to attempt to stand despite encouragement. Pt would benefit from continued skilled PT services via TCU to address deficits and improve IND with safety and functional  mobility.   PT Brief overview of current status Goals of therapy will be to address safe mobility and make recs for d/c to next level of care. Pt and RN will continue to follow all falls risk precautions as documented by RN staff while hospitalized. Recommend nursing use A x 2 with lift for transfers   PT Total Distance Amb During Session (feet) 0   Physical Therapy Time and Intention   Timed Code Treatment Minutes 22   Total Session Time (sum of timed and untimed services) 32

## 2025-06-10 NOTE — PROGRESS NOTES
Shift: 5003-8890    Cognition/Mentation/Neuro: Aox3-4, intermittent confusion, forgetful.   VS: VSS on RA  GI/: Incontinent, purewick in place. No BM this shift.   Pulmonary: LS clear, denies SOB  Pain: Managed with scheduled tylenol and PRN robaxin.   Drains/Lines: SL PIV  Skin: Blanchable redness to coccyx. Reddness to abdominal/ marla folds. Antifungal powder order obtained.   Activity: A2 lift, refused repositioning   Diet: Regular   Discharge: Pending

## 2025-06-10 NOTE — PROGRESS NOTES
[facility]    Medicine Progress Note - Hospitalist Service    Date of Admission:  6/7/2025    Assessment & Plan   78-year-old female with history of alcohol abuse, Alzheimer's dementia and residing in a memory care assisted living facility, history of fall and vertebral compression fracture, history of subdural hematoma, hypertension, hypothyroidism who presented after an unwitnessed fall and was found to have an L4 acute compression fracture.    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  Remains at ongoing fall risk in setting of osteoporosis and progressively declining executive function congruent to dementia. Given continued low back pain that radiated down to left thigh a MRI was completed showing left psoas abscess.   -Changed to inpatient status 6/8  -Underwent IR CT-guided aspiration of chronic left psoas fluid collection with rim calcification  -Left psoas fluid aspiration sent for culture; NGTD  -Blood cultures; no growth to date  -ID consulted will off abx for now    -Neurosurgery input appreciated. Plan for non-surgical approach, no bracing required. Could use abdominal binder if that would help pain.   -Neuro checks q shift and okay to defer overnight to promote sleep   -Echo completed  given unwitnessed fall shows EF 55-60%, no significant valvular abnormalities.   -Tele without dysthymia; will discontinue   -PT and OT (historically has refused PT evals in the past) she will sit at edge of bed but refusing to weight bare during PT session.   -Receiving scheduled tylenol and lidocaine patch. Nursing to premedicate with oxycodone vs robaxin in anticipation of movement with goal OOB for all meals.   -Up with assist and fall precautions   -Care coordination and  "social work; assisting with TCU placement     Alzheimer's dementia  H/o subdural hematoma, traumatic brain injury  Noted.  -hoping to return to memory care unit    Alcohol abuse history  Noted.    Chronic, stable medical issues:  Hypothyroidism: continue levothyroxine  Migraine headaches: Continue Topamax  Hypertension: Continue metoprolol        Diet: Regular Diet Adult    DVT Prophylaxis: Pneumatic Compression Devices  Hernadez Catheter: Not present  Lines: None     Cardiac Monitoring: None  Code Status: Full Code      Clinically Significant Risk Factors                   # Hypertension: Noted on problem list            # Obesity: Estimated body mass index is 38.72 kg/m  as calculated from the following:    Height as of this encounter: 1.778 m (5' 10\").    Weight as of this encounter: 122.4 kg (269 lb 13.5 oz)., PRESENT ON ADMISSION            Social Drivers of Health    Tobacco Use: Medium Risk (4/19/2021)    Patient History     Smoking Tobacco Use: Former     Smokeless Tobacco Use: Never          Disposition Plan     Medically Ready for Discharge: Anticipated Tomorrow           The patient's care was discussed with the Attending Physician, Dr. Fox.    Nadia Steve NP  Hospitalist Service  [facility]  Securely message with [employer] (more info)  Text page via [facility] Paging/Directory   ______________________________________________________________________    Interval History   No acute events overnight. Denies pain at rest. Tolerating diet. She is bothered by the bed and sheets. No interested in working with PT later. No fevers or chills.     Physical Exam   Vital Signs: Temp: 99.1  F (37.3  C) Temp src: Oral BP: (!) 144/78 Pulse: 80   Resp: 16 SpO2: 95 % O2 Device: None (Room air) Oxygen Delivery: 2 LPM  Weight: 269 lbs 13.49 oz    Physical Exam  Constitutional:       Appearance: She is obese. She is not ill-appearing or diaphoretic.   HENT:      Mouth/Throat:      Mouth: Mucous membranes " are moist.   Cardiovascular:      Rate and Rhythm: Normal rate and regular rhythm.      Pulses: Normal pulses.   Pulmonary:      Effort: Pulmonary effort is normal.      Breath sounds: Normal breath sounds.   Abdominal:      General: Bowel sounds are normal. There is distension.      Palpations: Abdomen is soft.      Tenderness: There is no abdominal tenderness.   Musculoskeletal:      Comments: Left, low back pain on palpation    Skin:     General: Skin is warm and dry.      Capillary Refill: Capillary refill takes less than 2 seconds.   Neurological:      Mental Status: She is alert. Mental status is at baseline.   Psychiatric:         Attention and Perception: She is inattentive.         Mood and Affect: Affect is labile.         Behavior: Behavior is cooperative.         Cognition and Memory: Cognition is impaired.       Medical Decision Making       50 MINUTES SPENT BY ME on the date of service doing chart review, history, exam, documentation & further activities per the note.      Data     I have personally reviewed the following data over the past 24 hrs:    9.6  \   12.5   / 380     133 (L) 102 16.8 /  104 (H)   4.2 19 (L) 0.74 \       Imaging results reviewed over the past 24 hrs:   No results found for this or any previous visit (from the past 24 hours).

## 2025-06-10 NOTE — PLAN OF CARE
Goal Outcome Evaluation:    Vital signs stable on RA. Alert and oriented x3. Denies chest pain, shortness of breath. Bowel sounds active, flatus +.blanchable redness on the bottom . Pain controlled with prn oxy. Up with assist of 2 with lift. Tolerating regular. Possible discharge to TCU when medically ready

## 2025-06-11 VITALS
WEIGHT: 269.84 LBS | HEART RATE: 68 BPM | RESPIRATION RATE: 16 BRPM | DIASTOLIC BLOOD PRESSURE: 67 MMHG | SYSTOLIC BLOOD PRESSURE: 115 MMHG | TEMPERATURE: 98.5 F | OXYGEN SATURATION: 96 % | HEIGHT: 70 IN | BODY MASS INDEX: 38.63 KG/M2

## 2025-06-11 LAB
ANION GAP SERPL CALCULATED.3IONS-SCNC: 12 MMOL/L (ref 7–15)
BUN SERPL-MCNC: 16.2 MG/DL (ref 8–23)
CALCIUM SERPL-MCNC: 8.9 MG/DL (ref 8.8–10.4)
CHLORIDE SERPL-SCNC: 102 MMOL/L (ref 98–107)
CREAT SERPL-MCNC: 0.68 MG/DL (ref 0.51–0.95)
CRP SERPL-MCNC: 43.81 MG/L
EGFRCR SERPLBLD CKD-EPI 2021: >90 ML/MIN/1.73M2
ERYTHROCYTE [SEDIMENTATION RATE] IN BLOOD BY WESTERGREN METHOD: 76 MM/HR (ref 0–30)
GLUCOSE SERPL-MCNC: 106 MG/DL (ref 70–99)
HCO3 SERPL-SCNC: 19 MMOL/L (ref 22–29)
POTASSIUM SERPL-SCNC: 4.1 MMOL/L (ref 3.4–5.3)
SODIUM SERPL-SCNC: 133 MMOL/L (ref 135–145)

## 2025-06-11 PROCEDURE — 36415 COLL VENOUS BLD VENIPUNCTURE: CPT | Performed by: SPECIALIST

## 2025-06-11 PROCEDURE — 250N000013 HC RX MED GY IP 250 OP 250 PS 637

## 2025-06-11 PROCEDURE — 86140 C-REACTIVE PROTEIN: CPT | Performed by: SPECIALIST

## 2025-06-11 PROCEDURE — 80048 BASIC METABOLIC PNL TOTAL CA: CPT

## 2025-06-11 PROCEDURE — 99239 HOSP IP/OBS DSCHRG MGMT >30: CPT

## 2025-06-11 PROCEDURE — 36415 COLL VENOUS BLD VENIPUNCTURE: CPT

## 2025-06-11 PROCEDURE — 250N000013 HC RX MED GY IP 250 OP 250 PS 637: Performed by: STUDENT IN AN ORGANIZED HEALTH CARE EDUCATION/TRAINING PROGRAM

## 2025-06-11 PROCEDURE — 85652 RBC SED RATE AUTOMATED: CPT | Performed by: SPECIALIST

## 2025-06-11 RX ORDER — ACETAMINOPHEN 325 MG/1
975 TABLET ORAL 3 TIMES DAILY
DISCHARGE
Start: 2025-06-11

## 2025-06-11 RX ORDER — LIDOCAINE 4 G/G
1 PATCH TOPICAL EVERY 24 HOURS
DISCHARGE
Start: 2025-06-11 | End: 2025-06-18

## 2025-06-11 RX ADMIN — ACETAMINOPHEN 975 MG: 325 TABLET, FILM COATED ORAL at 08:38

## 2025-06-11 RX ADMIN — ACETAMINOPHEN 975 MG: 325 TABLET, FILM COATED ORAL at 13:57

## 2025-06-11 RX ADMIN — PANTOPRAZOLE SODIUM 40 MG: 40 TABLET, DELAYED RELEASE ORAL at 06:51

## 2025-06-11 RX ADMIN — METOPROLOL TARTRATE 37.5 MG: 25 TABLET, FILM COATED ORAL at 08:39

## 2025-06-11 RX ADMIN — POTASSIUM CHLORIDE 20 MEQ: 750 TABLET, EXTENDED RELEASE ORAL at 08:38

## 2025-06-11 RX ADMIN — LEVOTHYROXINE SODIUM 100 MCG: 100 TABLET ORAL at 06:51

## 2025-06-11 RX ADMIN — TOPIRAMATE 75 MG: 50 TABLET, FILM COATED ORAL at 08:38

## 2025-06-11 RX ADMIN — UMECLIDINIUM 1 PUFF: 62.5 AEROSOL, POWDER ORAL at 08:43

## 2025-06-11 RX ADMIN — MICONAZOLE NITRATE: 2 POWDER TOPICAL at 08:44

## 2025-06-11 RX ADMIN — LIDOCAINE 1 PATCH: 4 PATCH TOPICAL at 08:39

## 2025-06-11 ASSESSMENT — ACTIVITIES OF DAILY LIVING (ADL)
ADLS_ACUITY_SCORE: 59
ADLS_ACUITY_SCORE: 59
ADLS_ACUITY_SCORE: 63
ADLS_ACUITY_SCORE: 59
ADLS_ACUITY_SCORE: 63
ADLS_ACUITY_SCORE: 63
ADLS_ACUITY_SCORE: 59
ADLS_ACUITY_SCORE: 64
ADLS_ACUITY_SCORE: 63
ADLS_ACUITY_SCORE: 59

## 2025-06-11 NOTE — PROGRESS NOTES
Care Management Discharge Note    Discharge Date: 06/11/2025       Discharge Disposition: Skilled Nursing Facility    Discharge Services: None    Discharge DME: None    Discharge Transportation: agency    Private pay costs discussed: transportation costs    Does the patient's insurance plan have a 3 day qualifying hospital stay waiver?  No    PAS Confirmation Code: [id number]  Patient/family educated on Medicare website which has current facility and service quality ratings: yes    Education Provided on the Discharge Plan: Yes  Persons Notified of Discharge Plans: Patient's POA, TCU, and [facility] staff  Patient/Family in Agreement with the Plan: yes    Handoff Referral Completed: No, handoff not indicated or clinically appropriate    Additional Information:  [facility]  Stretcher at [phone].  Please refer to  progress notes for further details.No further care management intervention anticipated at this time.  Please re-consult if further needs arise.  Care management signing off.        DIMAS Torres  [facility]  Social Work

## 2025-06-11 NOTE — PLAN OF CARE
Goal Outcome Evaluation:    Vital signs stable on RA. Alert and oriented x3. Denies chest pain, shortness of breath. Bowel sounds active, flatus +.blanchable on the bottom . Pain controlled with prn oxy. Up with assist of 2 with lift. Tolerating regular. Possible discharge to TCU

## 2025-06-11 NOTE — DISCHARGE SUMMARY
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    Weight as of this encounter: 122.4 kg (269 lb 13.5 oz).       Follow-ups Needed After Discharge   Follow-up Appointments       Follow Up and recommended labs and tests      Follow up with Nursing home physician.  No follow up labs or test are needed.  Follow up with Neurosurgery specialist, Sarah Gonzalez CNP, in 6 weeks with upright x-rays, appointment has been arranged for 7/23/25 at 10:30 am.                Unresulted Labs Ordered in the Past 30 Days of this Admission       Date and Time Order Name Status Description    6/9/2025  2:39 PM Aspirate Aerobic Bacterial Culture Routine Preliminary     6/9/2025  2:39 PM Anaerobic Bacterial Culture Routine Preliminary     6/8/2025  2:31 PM Blood Culture Peripheral blood (BC) Hand, Right Preliminary     6/8/2025  2:31 PM Blood Culture Peripheral blood (BC) Hand, Left Preliminary         These results will be followed up by Hospitalist in [facility]. Pt was monitored off abx. ID was consulted recommended to monitor off abx. Psoas abscess thought to be related to traumatic injury.     Discharge Disposition   Discharged to rehabilitation facility  Condition at discharge: Stable    Hospital Course   78-year-old female with history of alcohol abuse, Alzheimer's " dementia and residing in a memory care assisted living facility, history of fall and vertebral compression fracture, history of subdural hematoma, hypertension, hypothyroidism who presented after an unwitnessed fall and was found to have an L4 acute compression fracture.    Unwitnessed fall  Acute L4 compression fracture  Left psoas abscess, possible L4-L5 osteomyelitis/diskitis   Prior chronic vertebral fractures (L1, L5)  H/o posterior cervical fixation C2  Generalized weakness and imbalance    Remains at ongoing fall risk in setting of osteoporosis and progressively declining executive function congruent to dementia.  CT head and neck without acute abnormalities.  A CT lumbar spine shows acute inferior endplate L4 compression fracture and chronic L1 and L5 compression fracture and an intramuscular hematoma within the left medial psoas muscle.  Neurosurgery was consulted and recommended non surgical management. Given continued low back pain that radiated down to left thigh a MRI was completed showing left psoas abscess.  Left psoas abscess was drained via IR.  Has no growth to date.  Infectious disease was consulted and recommended monitoring off antibiotics.  CRP and ESR are down trending.  Remains without fever or leukocytosis.  Psoas abscess likely secondary to traumatic injury.  Therapies were consulted and recommended TCU to improve activity tolerance, balance and strength.    -Changed to inpatient status 6/8  -Underwent IR CT-guided aspiration of chronic left psoas fluid collection with rim calcification  -Left psoas fluid aspiration sent for culture; NGTD  -Blood cultures; no growth to date  -ID consulted will off abx for.  Felt so was abscess likely related to traumatic injury.    -Neurosurgery input appreciated. Plan for non-surgical approach, no bracing required. Could use abdominal binder if that would help pain.  Not needed this admission.  Plan to have neurosurgery follow-up within 6 weeks with  upright x-rays prior.  -Neuro checks q shift and okay to defer overnight to promote sleep without deficits.  -Echo completed  given unwitnessed fall shows EF 55-60%, no significant valvular abnormalities.   -Tele without dysthymia; will discontinue   -PT and OT (historically has refused PT evals in the past) she will sit at edge of bed but refusing to weight bare during PT session.   -Receiving scheduled tylenol and lidocaine patch. Nursing to premedicate with oxycodone vs robaxin in anticipation of movement with goal OOB for all meals and has not required this admission.  Will continue PTA 12.5 mg tramadol twice daily for pain.  -Up with assist and fall precautions   -Care coordination and social work; assisting with TCU placement   - Patient's friend King has been updated on hospital course and transition to TCU plan.    Alzheimer's dementia  H/o subdural hematoma, traumatic brain injury  Noted.  -hoping to return to memory care unit following TCU stay    Alcohol abuse history  Noted.    Chronic, stable medical issues:  Hypothyroidism: continue levothyroxine  Migraine headaches: Continue Topamax  Hypertension: Continue metoprolol    Consultations This Hospital Stay   CARE MANAGEMENT / SOCIAL WORK IP CONSULT  PHYSICAL THERAPY ADULT IP CONSULT  OCCUPATIONAL THERAPY ADULT IP CONSULT  NEUROSURGERY IP CONSULT  INFECTIOUS DISEASES IP CONSULT  INTERVENTIONAL RADIOLOGY ADULT/PEDS IP CONSULT  PHYSICAL THERAPY ADULT IP CONSULT    Code Status   Full Code    Time Spent on this Encounter   INadia NP, personally saw the patient today and spent greater than 30 minutes discharging this patient.       Nadia Steve NP  [facility]  [address] [facility] [address] [facility] [address]  Phone: [phone]  Fax: [phone]  ______________________________________________________________________    Physical Exam   Vital Signs: Temp: 98.1  F (36.7  C) Temp src: Oral BP: 115/67  Pulse: 68   Resp: 16 SpO2: 96 % O2 Device: None (Room air)    Weight: 269 lbs 13.49 oz  Physical Exam  HENT:      Mouth/Throat:      Mouth: Mucous membranes are moist.   Cardiovascular:      Rate and Rhythm: Normal rate and regular rhythm.      Pulses: Normal pulses.      Heart sounds: Normal heart sounds.   Pulmonary:      Effort: Pulmonary effort is normal.      Breath sounds: Normal breath sounds.   Abdominal:      General: Bowel sounds are normal. There is no distension.      Palpations: Abdomen is soft.      Tenderness: There is no abdominal tenderness.   Musculoskeletal:         General: Normal range of motion.   Skin:     General: Skin is warm and dry.      Capillary Refill: Capillary refill takes less than 2 seconds.   Neurological:      Mental Status: She is alert. Mental status is at baseline.   Psychiatric:         Mood and Affect: Mood is anxious.         Speech: Speech normal.         Behavior: Behavior is cooperative.         Cognition and Memory: Cognition is impaired.       Primary Care Physician   [facility]    Discharge Orders      XR Lumbar Spine 2/3 Views     Follow Up (TCM)    Follow-up with Neurosurgery KHAI in 6 weeks with repeat upright x-rays     General info for SNF    Length of Stay Estimate: Short Term Care: Estimated # of Days <30  Condition at Discharge: Stable  Level of care:skilled   Rehabilitation Potential: Good  Admission H&P remains valid and up-to-date: Yes  Recent Chemotherapy: N/A  Use Nursing Home Standing Orders: Yes     Mantoux instructions    Give two-step Mantoux (PPD) Per Facility Policy Yes     Follow Up and recommended labs and tests    Follow up with Nursing home physician.  No follow up labs or test are needed.  Follow up with Neurosurgery specialist, Sarah Gonzalez CNP, in 6 weeks with upright x-rays, appointment has been arranged for 7/23/25 at 10:30 am.     Reason for your hospital stay    You were seen after an unwitnessed fall and found to  have an L4 acute compression fracture, no surgery was recommended. You were also found to a left psoas abscess which was drained for cultures, negative to date.     Activity - Up with nursing assistance     Activity - Up with assistive device     Full Code     Physical Therapy Adult Consult    Evaluate and treat as clinically indicated.    Reason:  deficits in activity tolerance, balance, strength needing to improve safe mobility     Fall precautions     Diet    Follow this diet upon discharge: Current Diet: Regular Diet Adult       Significant Results and Procedures   Results for orders placed or performed during the hospital encounter of 06/07/25   Head CT w/o contrast    Narrative    EXAM: CT HEAD W/O CONTRAST  LOCATION: [facility]  DATE: 6/7/2025    INDICATION: fall, struck head  COMPARISON: None.  TECHNIQUE: Routine CT Head without IV contrast. Multiplanar reformats. Dose reduction techniques were used.    FINDINGS:  INTRACRANIAL CONTENTS: No evidence of acute intracranial hemorrhage or mass effect. Scattered foci of decreased attenuation within the cerebral hemispheric white matter which are nonspecific, though most commonly ascribed to chronic small vessel ischemic   disease. The ventricles and sulci are prominent consistent with mild brain parenchymal volume loss. Gray-white matter differentiation is maintained. The presumed colonization material within the left jugular bulb.    VISUALIZED ORBITS/SINUSES/MASTOIDS: The globes are unremarkable. The partially imaged paranasal sinuses, mastoid air cells and middle ear cavities are unremarkable.      BONES/SOFT TISSUES: The visualized skull base and calvarium are unremarkable.      Impression    IMPRESSION:    1.  No evidence of acute intracranial hemorrhage or mass effect.   CT Cervical Spine w/o Contrast    Narrative    EXAM: CT CERVICAL SPINE W/O CONTRAST  LOCATION: [facility]  DATE: 6/7/2025    INDICATION:  fall, struck head  COMPARISON: None.  TECHNIQUE: Routine CT Cervical Spine without IV contrast. Multiplanar reformats. Dose reduction techniques were used.    FINDINGS:  No evidence of acute fracture or subluxation of the cervical spine by CT imaging. Postsurgical changes posterior fixation of a remote C2 dens fracture. Anterolisthesis of C3 on C4 measuring 2 mm. The vertebral bodies of the cervical spine otherwise have   normal stature and alignment. The disc spaces are well-maintained. No significant degenerative changes. No extraspinal abnormality.     Craniovertebral junction and C1-C2: Normal.    C2-C3: No posterior disc bulge or spinal canal narrowing. No neural foraminal narrowing.     C3-C4: No posterior disc bulge or spinal canal narrowing. Uncovertebral joint disease and facet arthropathy with severe bilateral neural foraminal narrowing.     C4-C5: No posterior disc bulge or spinal canal narrowing. Uncovertebral joint disease and facet arthropathy with severe bilateral neural foraminal narrowing.     C5-C6: No posterior disc bulge or spinal canal narrowing. Uncovertebral joint disease and facet arthropathy with severe bilateral neural foraminal narrowing.     C6-C7: No posterior disc bulge or spinal canal narrowing. No neural foraminal.     C7-T1: No posterior disc bulge or spinal canal narrowing. No neural foraminal.       Impression    IMPRESSION:  1.  No evidence of acute fracture or subluxation of the cervical spine by CT imaging.  2.  Postsurgical changes posterior fixation of a remote C2 dens fracture.    CT Lumbar Spine w/o Contrast    Narrative    EXAM: CT LUMBAR SPINE W/O CONTRAST  LOCATION: [facility]  DATE: 6/7/2025    INDICATION: fall, back pain  COMPARISON: 10/14/2023  TECHNIQUE: Routine CT Lumbar Spine without IV contrast. Multiplanar reformats. Dose reduction techniques were used.    FINDINGS:  Nomenclature is based on 5 lumbar type vertebral bodies.   Acute  inferior endplate L4 compression fracture with 15% vertebral body height loss. Age-indeterminate L1 compression fracture with greater than 75% vertebral body height loss anteriorly.   Age-indeterminate superior plate L5 compression fracture with less than 20% vertebral body height loss. Anterolisthesis of L4 and L5 measuring 2 mm. The vertebral bodies of the lumbar spine otherwise have normal stature and alignment. Intramuscular    hematoma within the left medial psoas muscle measuring 2.9 x 1.5 x 2.4 cm (AP x TV x CC).   Unremarkable visualized bony pelvis.    T12-L1: Normal disc height. No herniation. Normal facets. No spinal canal or neural foraminal stenosis.    L1-L2: Normal disc height. No herniation. Normal facets. No spinal canal or neural foraminal stenosis.    L2-L3: Normal disc height. No herniation. Normal facets. No spinal canal or neural foraminal stenosis.    L3-L4: Normal disc height. No herniation. Normal facets. No spinal canal or neural foraminal stenosis.    L4-L5: Normal disc height. No herniation. Normal facets. No spinal canal or neural foraminal stenosis.    L5-S1: Normal disc height. No herniation. Normal facets. No spinal canal or neural foraminal stenosis.      Impression    IMPRESSION:  1.  Acute inferior endplate L4 compression fracture with 15% vertebral body height loss.   2.  Age-indeterminate L1 compression fracture with greater than 75% vertebral body height loss anteriorly.   3.  Age-indeterminate superior plate L5 compression fracture with less than 20% vertebral body height loss.  4.  Intramuscular  hematoma within the left medial psoas muscle measuring 2.9 x 1.5 x 2.4 cm (AP x TV x CC).      XR Chest 2 Views    Narrative    EXAM: XR CHEST 2 VIEWS  LOCATION: [facility]  DATE: 6/7/2025    INDICATION: generalized weakness, cough, rule out pneumonia  COMPARISON: 3/16/2020      Impression    IMPRESSION: Minimal elevation of the right hemidiaphragm. Patchy  left basilar atelectasis or scarring. No effusions or pneumothorax. Mild bronchial wall thickening. Heart size is normal. Right rotator cuff repair changes.   MR Lumbar Spine w/o & w Contrast    Narrative    EXAM: MR LUMBAR SPINE W/O and W CONTRAST  LOCATION: [facility]  DATE: 6/8/2025    INDICATION: compression fractrures (hx of breast cancer)  COMPARISON: Lumbar spine CT June 7, 2025.  CONTRAST: 11mL erik  TECHNIQUE: Routine Lumbar Spine MRI without and with IV contrast.    FINDINGS:   L5 upper endplate and L4 lower endplate fractures are present with up to 30% height loss at L5 and 20% height loss at L4. No change from previous. Fluid cleft adjacent to the lower endplate of L4. Patchy edema throughout the vertebral bodies with   associated enhancement. No posterior element involvement. There may be some minimal endplate erosion along the anterior aspect of the lower endplate of L4. Paraspinous thickening and enhancement partially encasing the aortoiliac area and extending into   the medial psoas musculature noted. Left sella is peripherally enhancing fluid collection is seen on axial image 44 of series 108 measuring 17 mm in diameter and 24 mm cephalocaudad. There is a smaller adjacent fluid collection measuring 11 mm in   diameter. Ventral canal linear epidural thickening is nonspecific. No evidence for discrete, drainable epidural fluid collection. Epidural findings could represent phlegmon versus venous vascular engorgement.    Severe T12 upper lower endplate fractures with marked anterior wedging. Minimal residual marrow edema and patchy enhancement. Unremarkable posterior elements.    The other lumbar vertebral body heights appear well-maintained. Lower thoracic vertebral bodies visualized on this study unremarkable.    T10-11: Disc desiccation. Facet DJD. Mild bilateral foraminal stenosis. Patent central canal.    T11-12: Disc desiccation and mild disc bulge. Ventral osteophyte.  Patent canal and foramen.     T12-L1: L1 fracture with 6 mm retropulsion of the upper posterior cortex on a chronic basis. Moderate canal stenosis. Patent foramina. Mild facet DJD. Disc desiccation and disc bulge.    L1-L2: Retropulsed bone and degenerative subluxation due to significant degenerative disc change. 8 mm displacement with moderate to severe central canal stenosis. Severe bilateral foraminal stenosis. Mild facet DJD.    L2-L3: Disc osteophyte. Evidence of vacuum disc. Facet DJD. Moderate central canal stenosis. Severe right and moderate left foraminal stenosis. Moderate bilateral lateral recess stenosis.    L3-L4: Broad-based disc bulge. Facet DJD. Vacuum disc. Moderate central canal stenosis. Moderate bilateral lateral recess stenosis. Severe right and moderate left foraminal stenosis.    L4-L5: Endplate fractures, abnormal T2 signal increase within the disc and patchy enhancement along the endplates. Paraspinous thickening and enhancement as above. Left psoas fluid collection. Prominent epidural enhancement posterior to the L4 vertebral   body and L4-5 disc is nonspecific. Discitis osteomyelitis is a significant concern especially given the reactive change in the adjacent psoas musculature and presence of potential psoas abscess or hematoma. No evidence for discrete, drainable epidural   abscess, however. Other possibilities include osteoporotic fractures or bone metastases but the disc centric location of this process would be somewhat unusual for this. Correlate with any signs or symptoms of infection/inflammation.    L5-S1: Disc osteophyte complex. Moderate moderate central canal stenosis. Facet DJD. Moderate to severe bilateral foraminal stenosis.      Impression    IMPRESSION:  1.  L4-5 disc and endplate changes with adjacent paraspinous thickening and enhancement. Left psoas fluid collection. 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 Other  possibilities include osteoporotic   fractures or less likely bone metastases.  2.  Chronic T12 and L1 fractures.  3.  Lumbar spondylosis.    4.  Findings discussed with MANE Paz of the neurosurgery service on matter now     CT Retroperitoneal Abscess Aspiration    Narrative    EXAM:  1. PERCUTANEOUS ASPIRATION LEFT PSOAS FLUID COLLECTION  2. CT GUIDANCE  LOCATION: [facility]  DATE: 2025    INDICATION: left psoas abscess  TECHNIQUE: Dose reduction techniques were used.    PROCEDURE: Informed consent obtained. Site marked. Prior images reviewed. Required items made available. Patient identity confirmed verbally and with arm band. Patient reevaluated immediately before administering sedation. Universal protocol was   followed. Time out performed. The site was prepped and draped in sterile fashion. 10 mL of 1% lidocaine was infused into the local soft tissues. Using standard technique and under direct CT guidance, a 5 [place] citizen catheter was inserted into the left psoas   collection. Note the collection is rim calcified on CT likely indicating a chronic process.    SPECIMEN: 5 mL of bloody fluid was aspirated and sent to lab for cultures and Gram stain.    BLOOD LOSS: Minimal.    The patient tolerated the procedure well. No immediate complications.    50 mcg IV fentanyl given for pain control.        Impression    IMPRESSION:  Successful CT-guided aspiration of a chronic left psoas fluid collection with rim calcification. Bloody fluid obtained.   Echocardiogram Complete     Value    LVEF  55-60%    Narrative    [id number]  [id number]  [id number]  [id number]^BENITO^JULIA^[facility]  Echocardiography Laboratory  [address] [place] [address]     Name: PENROSE, MELISSA JEAN  MRN: [id number]  : 1950  Study Date: 2025 01:12 PM  Age: 74 yrs  Gender: Female  Patient Location: [facility]  Reason For Study: Syncope and Collapse  Ordering  Physician: JULIA OLIVER  Performed By: Richie Bearden     BSA: 2.3 m2  Height: 70 in  Weight: 262 lb  ______________________________________________________________________________  Procedure  Echocardiogram with two-dimensional, color and spectral Doppler. Definity (NDC  #[id number]) given intravenously.  ______________________________________________________________________________  Interpretation Summary     Left ventricular systolic function is normal.  The visual ejection fraction is 55-60%.  The right ventricular systolic function is normal.  No hemodynamically significant valvular abnormalities on 2D or color flow  imaging. Technically difficult, suboptimal study.  ______________________________________________________________________________  Left Ventricle  The left ventricle is normal in size. Grade I or early diastolic dysfunction.  Left ventricular systolic function is normal. The visual ejection fraction is  55-60%. No regional wall motion abnormalities noted.     Right Ventricle  The right ventricle is not well visualized. The right ventricular systolic  function is normal.     Atria  Normal left atrial size. Right atrial size is normal.     Mitral Valve  The mitral valve is normal in structure and function. There is trace mitral  regurgitation. There is no mitral valve stenosis.     Tricuspid Valve  The tricuspid valve is not well visualized. Right ventricular systolic  pressure could not be approximated due to inadequate tricuspid regurgitation.     Aortic Valve  The aortic valve is not well visualized. No aortic regurgitation is present.  No hemodynamically significant valvular aortic stenosis.     Pulmonic Valve  The pulmonic valve is not well visualized.     Vessels  Normal size aorta. Inferior vena cava not well visualized for estimation of  right atrial pressure. The inferior vena cava is normal.     Pericardium  The pericardium is not well  visualized.  ______________________________________________________________________________  MMode/2D Measurements & Calculations  IVSd: 0.90 cm     LVIDd: 4.8 cm  LVIDs: 3.0 cm  LVPWd: 0.90 cm  FS: 37.5 %  LV mass(C)d: 147.8 grams  LV mass(C)dI: 63.1 grams/m2  Ao root diam: 3.1 cm  asc Aorta Diam: 3.3 cm  LVOT diam: 2.2 cm  LVOT area: 3.8 cm2  Ao root diam index Ht(cm/m): 1.7  Ao root diam index BSA (cm/m2): 1.3  Asc Ao diam index BSA (cm/m2): 1.4  Asc Ao diam index Ht(cm/m): 1.9  LA Volume (BP): 52.8 ml     LA Volume Index (BP): 22.6 ml/m2  RWT: 0.37  TAPSE: 2.8 cm     Doppler Measurements & Calculations  MV E max betty: 51.4 cm/sec  MV A max betty: 81.8 cm/sec  MV E/A: 0.63  MV dec slope: 215.0 cm/sec2  MV dec time: 0.24 sec  E/E' av.1  Lateral E/e': 6.4  Medial E/e': 7.9     ______________________________________________________________________________  Report approved by: Marlee Ac MD on 2025 02:55 PM             Discharge Medications   Current Discharge Medication List        START taking these medications    Details   Lidocaine (LIDOCARE) 4 % Patch Place 1 patch over 12 hours onto the skin every 24 hours for 7 days. To prevent lidocaine toxicity, patient should be patch free for 12 hrs daily.    Associated Diagnoses: Closed compression fracture of L4 lumbar vertebra, initial encounter (H)           CONTINUE these medications which have CHANGED    Details   acetaminophen (TYLENOL) 325 MG tablet Take 3 tablets (975 mg) by mouth 3 times daily.    Associated Diagnoses: Closed compression fracture of L4 lumbar vertebra, initial encounter (H)           CONTINUE these medications which have NOT CHANGED    Details   ammonium lactate (AMLACTIN) 12 % external cream Apply topically 2 times daily. For itchy skin      ascorbic acid (VITAMIN C) 500 MG CPCR CR capsule Take 500 mg by mouth daily.      azelastine (ASTELIN) 0.1 % nasal spray Spray 1 spray into both nostrils daily.      DEEP SEA NASAL SPRAY 0.65 %  nasal spray Spray 1-2 sprays in nostril 2 times daily. And daily as needed.      Dextromethorphan-guaiFENesin  MG/5ML syrup Take 10 mLs by mouth every 4 hours as needed for cough (and or congestion).      flurbiprofen (ANSAID) 100 MG tablet Take 100 mg by mouth every 8 hours as needed (Pain).      fluticasone (FLONASE) 50 MCG/ACT nasal spray INHALE 1-2 SPRAYS INTO BOTH NOSTRILS ONCE DAILY  Qty: 16 g, Refills: 0    Comments: NEED REFILLS PLEASE, THANK YOU.  Associated Diagnoses: Seasonal allergic rhinitis, unspecified trigger      folic acid (FOLVITE) 1 MG tablet TAKE 1 TAB BY MOUTH ONCE DAILY  Qty: 30 tablet, Refills: 7    Associated Diagnoses: Alcoholism /alcohol abuse      guaiFENesin (MUCINEX) 600 MG 12 hr tablet Take 600 mg by mouth 2 times daily.      levothyroxine (SYNTHROID/LEVOTHROID) 100 MCG tablet Take 100 mcg by mouth every morning (before breakfast).      metoclopramide (REGLAN) 10 MG tablet Take 10-20 mg by mouth See Admin Instructions. Every 4-6 hours as needed for GERD      metoprolol tartrate (LOPRESSOR) 25 MG tablet Take 37.5 mg by mouth 2 times daily.      montelukast (SINGULAIR) 10 MG tablet TAKE 1 TAB BY MOUTH AT BEDTIME  Qty: 30 tablet, Refills: 4    Associated Diagnoses: Acute seasonal allergic rhinitis due to pollen      omeprazole (PRILOSEC) 20 MG CR capsule TAKE 1 CAP BY MOUTH ONCE DAILY  Qty: 30 capsule, Refills: 7    Associated Diagnoses: Esophageal reflux      potassium chloride ER (K-TAB/KLOR-CON) 10 MEQ CR tablet Take 20 mEq by mouth daily.      rizatriptan (MAXALT) 10 MG tablet Take 10 mg by mouth at onset of headache for migraine. May repeat after 2 hours. Max 2 tabs/day and 10 days/month      sodium fluoride (SODIUM FLUORIDE 5000 PLUS) 1.1 % CREA Apply to affected area daily as needed. Brush on teeth and spit as needed as directed      terbinafine (LAMISIL) 1 % external cream Apply topically 2 times daily. Apply under right breast for rash      tiotropium (SPIRIVA RESPIMAT) 2.5  MCG/ACT inhaler Inhale 2 puffs into the lungs daily.      topiramate (TOPAMAX) 50 MG tablet Take 75 mg by mouth 2 times daily.      traMADol (ULTRAM) 50 MG tablet Take 12.5 mg by mouth 2 times daily.      triamcinolone (KENALOG) 0.1 % external cream Apply topically 4 times daily as needed for irritation.      VITAMIN B-1 100 MG tablet TAKE 1 TAB BY MOUTH ONCE DAILY  Qty: 30 tablet, Refills: 7    Associated Diagnoses: Alcoholism /alcohol abuse      vitamin D2 (ERGOCALCIFEROL) 14084 units (1250 mcg) capsule Take 50,000 Units by mouth once a week. Takes on Mondays           Allergies   Allergies   Allergen Reactions    Penicillins Swelling     Reports facial swelling, tolerated ceftriaxone per Care Everywhere in 10/2014    Tree Nuts  [Nuts] Other (See Comments)     Vomiting, throat closing, digestive issues    Nuts [Peanut-Containing Drug Products] Nausea and Vomiting    Amoxicillin     Morphine     Rofecoxib Unknown and Rash

## 2025-06-11 NOTE — PLAN OF CARE
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

## 2025-06-11 NOTE — PROGRESS NOTES
Care Management Follow Up    Length of Stay (days): 3    Expected Discharge Date: 06/12/2025     Concerns to be Addressed: all concerns addressed in this encounter     Patient plan of care discussed at interdisciplinary rounds: Yes    Anticipated Discharge Disposition: Skilled Nursing Facility              Anticipated Discharge Services: None  Anticipated Discharge DME: None    Patient/family educated on Medicare website which has current facility and service quality ratings: yes  Education Provided on the Discharge Plan: Yes  Patient/Family in Agreement with the Plan: yes    Referrals Placed by CM/SW: Post Acute Facilities  Private pay costs discussed: Not applicable    Discussed  Partnership in Safe Discharge Planning  document with patient/family: No     Handoff Completed: No, handoff not indicated or clinically appropriate    Additional Information:  Per DOD this AM,       Per care progression rounds this AM, writer was informed the patient might not be ready today.     Writer contacted [facility] who stated they will review and get back to the writer soon.     Writer reached out to the provider who stated the patient is medically ready.     Addendum 1121:  Writer received a call back from Kayla maher [facility] who stated they can clinically accept the patient today until 1830.    Writer contacted [employer] and scheduled a stretcher ride between [id number].  PCS completed    Writer contacted the patient's POA, Reena ([phone]), and updated on the discharge plan for today.  Writer informed Reena of potential out-of-pocket cost for transportation.  Reena consented to the discharge plan for today.    Writer contacted [employer] to complete prior authorization.  Writer was able to get the patient's case approved for coverage.  Approved auth is [id number].  Coverage dates are between 06/11 - 06/17.  Writer shared this information with Kayla maher [facility].    Writer reached out to the provider who  placed discharge orders.  Writer sent the discharge orders via DOD to [facility].    PAS-RR    D: Per DHS regulation, SW completed and submitted PAS-RR to [place] [employer] Direct Connect via the Senior LinkAge Line.  PAS-RR confirmation # is : [id number]     I: SW spoke with patient and they are aware a PAS-RR has been submitted.  SW reviewed with patient that they may be contacted for a follow up appointment within 10 days of hospital discharge if their SNF stay is < 30 days.  Contact information for [facility] LinkAge Line was also provided.    A: patient verbalized understanding.    P: Further questions may be directed to [facility] LinkAge Line at #[phone], option #4 for PAS-RR staff.  Writer confirmed with Kayla they were all clear to admit the patient today.        Next Steps: discharge today.     DIMAS Torres  [facility]  Social Work

## 2025-06-11 NOTE — PROGRESS NOTES
Shift Summary [id number]    Admitting Diagnosis: Hematoma [T14.8XXA]  Generalized weakness [R53.1]  Closed head injury, initial encounter [S09.90XA]  Fall, initial encounter [W19.XXXA]  Closed compression fracture of L5 lumbar vertebra, initial encounter (H) [S32.050A]  Closed compression fracture of L4 lumbar vertebra, initial encounter (H) [S32.040A]  Compression fracture of L1 vertebra, initial encounter (H) [S32.010A]     Vitals: VSS.RA  Pain: minimal back pain when resting. Schedule Tyl and Lidocaine.  A&Ox3 intermittent confusion. Agitation at a time  Voiding: incontinent. Purewick in place  Mobility: lift x2. Refused to get up. Refused reposition. Shift weight. Offered pain med before reposition but declined  CMS: intact ex baseline numbness on toes  Lung Sounds: clear. Denied SOB or CP  GI: BS active. BM x1  Diet: regular. Denied n/v.       Plan: discharge to TCU with stretcher transport today.

## 2025-06-12 ENCOUNTER — PATIENT OUTREACH (OUTPATIENT)
Dept: CARE COORDINATION | Facility: CLINIC | Age: 75
End: 2025-06-12
Payer: COMMERCIAL

## 2025-06-12 LAB
BACTERIA ASPIRATE CULT: NORMAL
BACTERIA ASPIRATE CULT: NORMAL
BACTERIA SPEC CULT: NORMAL
BACTERIA SPEC CULT: NORMAL

## 2025-06-12 NOTE — PROGRESS NOTES
[facility]: [facility]    Background: Transitional Care Management program identified per system criteria and reviewed by [facility] team for possible outreach.    Assessment: Upon chart review, CCRC Team member will not proceed with patient outreach related to this episode of Transitional Care Management program due to reason below:    Non-[facility] TCU: CCRC team member noted patient discharged to TCU/ARU/LTACH. Patient is not established with a [facility] currently supported by Primary Care-Care Coordination therefore handoff to Primary Care-Care Coordination is not appropriate at this time.    Plan: Transitional Care Management episode addressed appropriately per reason noted above.      LOLA Winters  [facility] [place], [facility]    *Connected Care Resource Team does NOT follow patient ongoing. Referrals are identified based on internal discharge reports and the outreach is to ensure patient has an understanding of their discharge instructions.

## 2025-06-12 NOTE — PLAN OF CARE
Physical Therapy Discharge Summary    Reason for therapy discharge:    Discharged to transitional care facility.    Progress towards therapy goal(s). See goals on Care Plan in [facility] electronic health record for goal details.  Goals not met.  Barriers to achieving goals:   discharge from facility.    Therapy recommendation(s):    Continued therapy is recommended.  Rationale/Recommendations:  Pt is below baseline. Pt currently requiring assist with all functional mobility. Pt presents with deficits in activity tolerance, balance, strength, and increased pain. Due to these deficits, pt declines to attempt to stand despite encouragement. Pt would benefit from continued skilled PT services via TCU to address deficits and improve IND with safety and functional mobility..

## 2025-06-13 LAB
BACTERIA SPEC CULT: NO GROWTH
BACTERIA SPEC CULT: NO GROWTH

## 2025-06-14 LAB — BACTERIA ASPIRATE CULT: NO GROWTH

## 2025-06-16 LAB — BACTERIA ASPIRATE CULT: NORMAL

## 2025-07-24 ENCOUNTER — TELEPHONE (OUTPATIENT)
Dept: NEUROLOGY | Facility: CLINIC | Age: 75
End: 2025-07-24
Payer: COMMERCIAL

## 2025-07-24 NOTE — TELEPHONE ENCOUNTER
Writer spoke with friend ( Reena) about scheduling 6 mo follow up appt with RIKKI Gonzalez - Reena stated that the patient is still in the hospital and she does not when patient will be getting out, also patient is not walking at this time.

## 2025-08-16 ENCOUNTER — HEALTH MAINTENANCE LETTER (OUTPATIENT)
Age: 75
End: 2025-08-16

## (undated) RX ORDER — LIDOCAINE HYDROCHLORIDE 10 MG/ML
INJECTION, SOLUTION EPIDURAL; INFILTRATION; INTRACAUDAL; PERINEURAL
Status: DISPENSED
Start: 2025-06-09

## (undated) RX ORDER — FENTANYL CITRATE 50 UG/ML
INJECTION, SOLUTION INTRAMUSCULAR; INTRAVENOUS
Status: DISPENSED
Start: 2025-06-09